# Patient Record
Sex: MALE | Race: WHITE | NOT HISPANIC OR LATINO | Employment: OTHER | URBAN - METROPOLITAN AREA
[De-identification: names, ages, dates, MRNs, and addresses within clinical notes are randomized per-mention and may not be internally consistent; named-entity substitution may affect disease eponyms.]

---

## 2018-07-06 ENCOUNTER — TRANSFERRED RECORDS (OUTPATIENT)
Dept: HEALTH INFORMATION MANAGEMENT | Facility: CLINIC | Age: 63
End: 2018-07-06

## 2019-01-28 ENCOUNTER — TRANSFERRED RECORDS (OUTPATIENT)
Dept: HEALTH INFORMATION MANAGEMENT | Facility: CLINIC | Age: 64
End: 2019-01-28

## 2019-02-03 ENCOUNTER — HOSPITAL ENCOUNTER (EMERGENCY)
Facility: CLINIC | Age: 64
Discharge: HOME | End: 2019-02-03
Attending: NURSE PRACTITIONER | Admitting: NURSE PRACTITIONER
Payer: COMMERCIAL

## 2019-02-03 VITALS
BODY MASS INDEX: 35.44 KG/M2 | DIASTOLIC BLOOD PRESSURE: 74 MMHG | OXYGEN SATURATION: 95 % | WEIGHT: 247 LBS | SYSTOLIC BLOOD PRESSURE: 160 MMHG | HEART RATE: 96 BPM | TEMPERATURE: 98.6 F | RESPIRATION RATE: 16 BRPM

## 2019-02-03 DIAGNOSIS — Z51.89 VISIT FOR WOUND CHECK: ICD-10-CM

## 2019-02-03 PROCEDURE — 99202 OFFICE O/P NEW SF 15 MIN: CPT | Mod: Z6 | Performed by: NURSE PRACTITIONER

## 2019-02-03 PROCEDURE — G0463 HOSPITAL OUTPT CLINIC VISIT: HCPCS | Performed by: NURSE PRACTITIONER

## 2019-02-03 RX ORDER — ATORVASTATIN CALCIUM 20 MG/1
20 TABLET, FILM COATED ORAL DAILY
COMMUNITY
End: 2020-12-24

## 2019-02-03 ASSESSMENT — ENCOUNTER SYMPTOMS
CHILLS: 0
COUGH: 0
FEVER: 0
NAUSEA: 0
EYE PAIN: 0
DYSURIA: 0
SPEECH DIFFICULTY: 0
WOUND: 1
SINUS PAIN: 0
CONFUSION: 0
SORE THROAT: 0
WHEEZING: 0
SHORTNESS OF BREATH: 0
WEAKNESS: 0
ABDOMINAL PAIN: 0
DIARRHEA: 0
VOMITING: 0
NUMBNESS: 0
DIFFICULTY URINATING: 0
DIAPHORESIS: 0
CONSTIPATION: 0

## 2019-02-03 NOTE — ED AVS SNAPSHOT
[facility] Emergency Department  [address] [address]  Phone:  [phone]  Fax:  [phone]                                    Jean Claude Sneed   MRN: [id number]    Department:  [facility] Emergency Department   Date of Visit:  2/3/2019           After Visit Summary Signature Page    I have received my discharge instructions, and my questions have been answered. I have discussed any challenges I see with this plan with the nurse or doctor.    ..........................................................................................................................................  Patient/Patient Representative Signature      ..........................................................................................................................................  Patient Representative Print Name and Relationship to Patient    ..................................................               ................................................  Date                                   Time    ..........................................................................................................................................  Reviewed by Signature/Title    ...................................................              ..............................................  Date                                               Time          22EPIC Rev 08/18

## 2019-05-25 ENCOUNTER — HOSPITAL ENCOUNTER (EMERGENCY)
Facility: CLINIC | Age: 64
Discharge: HOME | End: 2019-05-25
Attending: EMERGENCY MEDICINE | Admitting: EMERGENCY MEDICINE
Payer: COMMERCIAL

## 2019-05-25 VITALS
DIASTOLIC BLOOD PRESSURE: 113 MMHG | OXYGEN SATURATION: 94 % | WEIGHT: 247.4 LBS | HEIGHT: 70 IN | SYSTOLIC BLOOD PRESSURE: 169 MMHG | BODY MASS INDEX: 35.42 KG/M2 | RESPIRATION RATE: 18 BRPM | TEMPERATURE: 97.9 F

## 2019-05-25 DIAGNOSIS — N28.9 RENAL INSUFFICIENCY: ICD-10-CM

## 2019-05-25 DIAGNOSIS — K64.4 EXTERNAL HEMORRHOIDS: ICD-10-CM

## 2019-05-25 DIAGNOSIS — K62.5 RECTAL BLEEDING: ICD-10-CM

## 2019-05-25 LAB
ANION GAP SERPL CALCULATED.3IONS-SCNC: 9 MMOL/L (ref 3–14)
APTT PPP: 30 SEC (ref 22–37)
BASOPHILS # BLD AUTO: 0 10E9/L (ref 0–0.2)
BASOPHILS NFR BLD AUTO: 0.6 %
BUN SERPL-MCNC: 33 MG/DL (ref 7–30)
CALCIUM SERPL-MCNC: 9.7 MG/DL (ref 8.5–10.1)
CHLORIDE SERPL-SCNC: 105 MMOL/L (ref 94–109)
CO2 SERPL-SCNC: 25 MMOL/L (ref 20–32)
CREAT SERPL-MCNC: 1.85 MG/DL (ref 0.66–1.25)
DIFFERENTIAL METHOD BLD: NORMAL
EOSINOPHIL # BLD AUTO: 0.1 10E9/L (ref 0–0.7)
EOSINOPHIL NFR BLD AUTO: 1.5 %
ERYTHROCYTE [DISTWIDTH] IN BLOOD BY AUTOMATED COUNT: 12.2 % (ref 10–15)
GFR SERPL CREATININE-BSD FRML MDRD: 37 ML/MIN/{1.73_M2}
GLUCOSE SERPL-MCNC: 129 MG/DL (ref 70–99)
HCT VFR BLD AUTO: 48.8 % (ref 40–53)
HEMOCCULT STL QL: ABNORMAL
HGB BLD-MCNC: 16.4 G/DL (ref 13.3–17.7)
IMM GRANULOCYTES # BLD: 0 10E9/L (ref 0–0.4)
IMM GRANULOCYTES NFR BLD: 0.3 %
INR PPP: 1.03 (ref 0.86–1.14)
INTERNAL QC OK POCT: YES
LYMPHOCYTES # BLD AUTO: 2 10E9/L (ref 0.8–5.3)
LYMPHOCYTES NFR BLD AUTO: 30.4 %
MCH RBC QN AUTO: 30 PG (ref 26.5–33)
MCHC RBC AUTO-ENTMCNC: 33.6 G/DL (ref 31.5–36.5)
MCV RBC AUTO: 89 FL (ref 78–100)
MONOCYTES # BLD AUTO: 0.6 10E9/L (ref 0–1.3)
MONOCYTES NFR BLD AUTO: 9.1 %
NEUTROPHILS # BLD AUTO: 3.9 10E9/L (ref 1.6–8.3)
NEUTROPHILS NFR BLD AUTO: 58.1 %
NRBC # BLD AUTO: 0 10*3/UL
NRBC BLD AUTO-RTO: 0 /100
PLATELET # BLD AUTO: 171 10E9/L (ref 150–450)
POTASSIUM SERPL-SCNC: 4 MMOL/L (ref 3.4–5.3)
RBC # BLD AUTO: 5.47 10E12/L (ref 4.4–5.9)
SODIUM SERPL-SCNC: 139 MMOL/L (ref 133–144)
TEST CARD LOT NUMBER: ABNORMAL
WBC # BLD AUTO: 6.7 10E9/L (ref 4–11)

## 2019-05-25 PROCEDURE — 96361 HYDRATE IV INFUSION ADD-ON: CPT | Performed by: EMERGENCY MEDICINE

## 2019-05-25 PROCEDURE — 96360 HYDRATION IV INFUSION INIT: CPT | Performed by: EMERGENCY MEDICINE

## 2019-05-25 PROCEDURE — 99284 EMERGENCY DEPT VISIT MOD MDM: CPT | Mod: Z6 | Performed by: EMERGENCY MEDICINE

## 2019-05-25 PROCEDURE — 85610 PROTHROMBIN TIME: CPT | Performed by: EMERGENCY MEDICINE

## 2019-05-25 PROCEDURE — 99283 EMERGENCY DEPT VISIT LOW MDM: CPT | Mod: 25 | Performed by: EMERGENCY MEDICINE

## 2019-05-25 PROCEDURE — 25000128 H RX IP 250 OP 636: Performed by: EMERGENCY MEDICINE

## 2019-05-25 PROCEDURE — 85025 COMPLETE CBC W/AUTO DIFF WBC: CPT | Performed by: EMERGENCY MEDICINE

## 2019-05-25 PROCEDURE — 85730 THROMBOPLASTIN TIME PARTIAL: CPT | Performed by: EMERGENCY MEDICINE

## 2019-05-25 PROCEDURE — 82272 OCCULT BLD FECES 1-3 TESTS: CPT | Performed by: EMERGENCY MEDICINE

## 2019-05-25 PROCEDURE — 80048 BASIC METABOLIC PNL TOTAL CA: CPT | Performed by: EMERGENCY MEDICINE

## 2019-05-25 RX ORDER — HYDROCHLOROTHIAZIDE 25 MG/1
25 TABLET ORAL EVERY MORNING
COMMUNITY
Start: 2014-12-01 | End: 2020-12-24

## 2019-05-25 RX ADMIN — SODIUM CHLORIDE 500 ML: 9 INJECTION, SOLUTION INTRAVENOUS at 10:44

## 2019-05-25 ASSESSMENT — ENCOUNTER SYMPTOMS
CONSTIPATION: 0
NECK PAIN: 0
NUMBNESS: 0
WEAKNESS: 0
BACK PAIN: 0
DYSURIA: 0
ABDOMINAL DISTENTION: 0
DIARRHEA: 0
RECTAL PAIN: 1
NAUSEA: 0
BLOOD IN STOOL: 1
VOMITING: 0
ANAL BLEEDING: 1
WOUND: 0
HEADACHES: 0
ABDOMINAL PAIN: 0
BRUISES/BLEEDS EASILY: 0
SHORTNESS OF BREATH: 0

## 2019-05-25 ASSESSMENT — MIFFLIN-ST. JEOR: SCORE: 1908.45

## 2019-05-25 NOTE — DISCHARGE INSTRUCTIONS
Return if symptoms worsen or new symptoms develop.  Follow-up with primary care physician next week for recheck.  Use Preparation H and Tucks pads for comfort.  Take Metamucil or Citrucel as directed.  Drink plenty of fluids.  Your kidney function has increased a bit and increased hydration may help with this a bit.  No evidence of thrombosed hemorrhoids.  [facility] if not improving.  If any fevers increased pain increased bleeding decreased urine output or other symptoms present please return for further evaluation and care.

## 2019-05-25 NOTE — ED PROVIDER NOTES
History     Chief Complaint   Patient presents with     Rectal Bleeding     bleeding for 30 min post BM, pt takes baby aspirin     HPI  Jean Claude Sneed is a 64 year old male who presents to the emergency department today for evaluation of rectal bleeding. Patient states that once every three or four months he has a bowel movement that results in rectal bleeding. Normally he notices the blood when he wipes and then within a minute it stops. Patient states that this morning after a bowel movement he noticed blood in the toilet paper. He reports that it took half an hour for the bleeding to stop. After a few hours he had another bowel movement and the bleeding began again. He states that he had enough and wanted to be seen in the ED to figure out what is wrong. He reports mild pain during his bowel movements. Patient reports a history of hemorrhoids and believes maybe one of these is causing his bleeding. He denies any chest pain, leg swelling or shortness of breath. He reports that he had a colonoscopy last year. He takes a daily baby aspirin and medications for hypertension and hyperlipidemia.  He denies any pain at this time.    Patient Active Problem List   Diagnosis     Essential hypertension, benign     Tobacco abuse     Current Outpatient Medications   Medication Sig Dispense Refill     aspirin 81 MG EC tablet Take 81 mg by mouth daily.       atorvastatin (LIPITOR) 20 MG tablet Take 20 mg by mouth daily       fluticasone (FLONASE) 50 MCG/ACT nasal spray Spray 2 sprays into both nostrils daily as needed For nasal congestion       hydrochlorothiazide (MICROZIDE) 12.5 MG capsule Take 25 mg by mouth daily.       lisinopril (PRINIVIL,ZESTRIL) 20 MG tablet Take 20 mg by mouth daily.       omeprazole (PRILOSEC) 40 MG capsule Take 40 mg by mouth every evening       Allergies   Allergen Reactions     Beta Adrenergic Blockers Other (See Comments)     Hands arms and feet cold      Azelastine Other (See Comments)      "Dizzy, \"shaky\" and sick to stomach     Ciprofloxacin Other (See Comments)     Dizzy and sick to stomach     Diltiazem Other (See Comments)     Blood pressure up and pulse up then down      Ibuprofen      Other reaction(s): Hypertension     Naproxen Other (See Comments)     Feels sick, like coming down with the stomach flu     Citalopram Anxiety     Other reaction(s): Agitation  Side effects started in less than an hour after first pill      Allergies:  Allergies   Allergen Reactions     Beta Adrenergic Blockers Other (See Comments)     Hands arms and feet cold      Azelastine Other (See Comments)     Dizzy, \"shaky\" and sick to stomach     Ciprofloxacin Other (See Comments)     Dizzy and sick to stomach     Diltiazem Other (See Comments)     Blood pressure up and pulse up then down      Ibuprofen      Other reaction(s): Hypertension     Naproxen Other (See Comments)     Feels sick, like coming down with the stomach flu     Citalopram Anxiety     Other reaction(s): Agitation  Side effects started in less than an hour after first pill       Problem List:    Patient Active Problem List    Diagnosis Date Noted     Essential hypertension, benign 07/26/2012     Priority: Medium     Tobacco abuse 07/26/2012     Priority: Medium        Past Medical History:    Past Medical History:   Diagnosis Date     HTN (hypertension)        Past Surgical History:    Past Surgical History:   Procedure Laterality Date     left knee surgery  1996       Family History:    No family history on file.    Social History:  Marital Status:   [5]  Social History     Tobacco Use     Smoking status: Current Every Day Smoker     Packs/day: 0.50     Years: 30.00     Pack years: 15.00     Types: Cigarettes     Smokeless tobacco: Never Used   Substance Use Topics     Alcohol use: Not on file     Drug use: Not on file        Medications:      aspirin 81 MG EC tablet   atorvastatin (LIPITOR) 20 MG tablet   fluticasone (FLONASE) 50 MCG/ACT nasal " "spray   hydrochlorothiazide (MICROZIDE) 12.5 MG capsule   lisinopril (PRINIVIL,ZESTRIL) 20 MG tablet   omeprazole (PRILOSEC) 40 MG capsule         Review of Systems   Constitutional: Positive for activity change. Negative for fever.   HENT: Negative for congestion.    Eyes: Negative for visual disturbance.   Respiratory: Negative for shortness of breath.    Cardiovascular: Negative for chest pain and leg swelling.   Gastrointestinal: Positive for anal bleeding, blood in stool and rectal pain. Negative for abdominal distention, abdominal pain, constipation, diarrhea, nausea and vomiting.   Genitourinary: Negative for decreased urine volume and dysuria.   Musculoskeletal: Negative for back pain, gait problem and neck pain.   Skin: Negative for wound.   Neurological: Negative for weakness, numbness and headaches.   Hematological: Does not bruise/bleed easily.   Psychiatric/Behavioral: Negative for confusion.       Physical Exam   BP: (!) 169/113  Heart Rate: 109  Temp: 97.9  F (36.6  C)  Resp: 18  Height: 177.8 cm (5' 10\")  Weight: 112.2 kg (247 lb 6.4 oz)  SpO2: 94 %      Physical Exam   Constitutional: He appears well-nourished. No distress.   Eyes: Conjunctivae are normal.   Pulmonary/Chest: Effort normal.   Abdominal: Soft. Bowel sounds are normal. He exhibits no distension. There is no tenderness.   Genitourinary:   Genitourinary Comments: Rectal with several external nonthrombosed hemorrhoids present and a apparent internal hemorrhoid hanging down.  This area was reduced with gentle pressure.  Areas are tender to palpation.  There is no gross bleeding.   Musculoskeletal: Normal range of motion. He exhibits no tenderness.   Neurological: He is alert. He exhibits normal muscle tone.   Skin: Skin is warm and dry. Capillary refill takes less than 2 seconds. No pallor.   Psychiatric: He has a normal mood and affect.   Nursing note and vitals reviewed.      ED Course   10:15 AM Patient assessed.       Procedures        "        Critical Care time:  none               Results for orders placed or performed during the hospital encounter of 05/25/19 (from the past 24 hour(s))   Occult blood stool POCT   Result Value Ref Range    Occult Blood pos (Pos) neg    Internal QC OK Yes     Test Card Lot Number [id number]    CBC with platelets differential   Result Value Ref Range    WBC 6.7 4.0 - 11.0 10e9/L    RBC Count 5.47 4.4 - 5.9 10e12/L    Hemoglobin 16.4 13.3 - 17.7 g/dL    Hematocrit 48.8 40.0 - 53.0 %    MCV 89 78 - 100 fl    MCH 30.0 26.5 - 33.0 pg    MCHC 33.6 31.5 - 36.5 g/dL    RDW 12.2 10.0 - 15.0 %    Platelet Count 171 150 - 450 10e9/L    Diff Method Automated Method     % Neutrophils 58.1 %    % Lymphocytes 30.4 %    % Monocytes 9.1 %    % Eosinophils 1.5 %    % Basophils 0.6 %    % Immature Granulocytes 0.3 %    Nucleated RBCs 0 0 /100    Absolute Neutrophil 3.9 1.6 - 8.3 10e9/L    Absolute Lymphocytes 2.0 0.8 - 5.3 10e9/L    Absolute Monocytes 0.6 0.0 - 1.3 10e9/L    Absolute Eosinophils 0.1 0.0 - 0.7 10e9/L    Absolute Basophils 0.0 0.0 - 0.2 10e9/L    Abs Immature Granulocytes 0.0 0 - 0.4 10e9/L    Absolute Nucleated RBC 0.0    INR   Result Value Ref Range    INR 1.03 0.86 - 1.14   Partial thromboplastin time   Result Value Ref Range    PTT 30 22 - 37 sec   Basic metabolic panel   Result Value Ref Range    Sodium 139 133 - 144 mmol/L    Potassium 4.0 3.4 - 5.3 mmol/L    Chloride 105 94 - 109 mmol/L    Carbon Dioxide 25 20 - 32 mmol/L    Anion Gap 9 3 - 14 mmol/L    Glucose 129 (H) 70 - 99 mg/dL    Urea Nitrogen 33 (H) 7 - 30 mg/dL    Creatinine 1.85 (H) 0.66 - 1.25 mg/dL    GFR Estimate 37 (L) >60 mL/min/[1.73_m2]    GFR Estimate If Black 43 (L) >60 mL/min/[1.73_m2]    Calcium 9.7 8.5 - 10.1 mg/dL       Medications - No data to display    Assessments & Plan (with Medical Decision Making) records were reviewed.  Labs were obtained.  Patient was given IV fluids.  Patient has apparent internal and external hemorrhoids which are  more than likely the cause of his bleeding at this point.  These hemorrhoids are not thrombosed at this point.  White count was 6.7.  Hemoglobin 16.4.  Platelet count was 171 there is no left shift.  Basic metabolic panel significant for a BUN elevated at 33 creatinine of 1.85 last BUN noted was 1.85.  INR was 1.03 and PTT was 30.  Findings were discussed with patient.  At this time he is not having any abdominal pain.  He is only having minimal bleeding at this time vitals are stable.  I discussed treatment of hemorrhoids with patient.  Including hemorrhoidal creams tuck pads and sits baths.  He should begin Metamucil or Citrucel daily.  And probably should follow-up with a surgeon for further evaluation and care and possible colonoscopy.  Patient had a colonoscopy a year ago.  Blood pressure is improved but slightly elevated.  He [facility]  He states he [facility] this [facility] for further assessment and care.  He understands if any increased rectal pain worsening bleeding or other symptoms occur he will return for further evaluation and care.     I have reviewed the nursing notes.    I have reviewed the findings, diagnosis, plan and need for follow up with the patient.          Medication List      There are no discharge medications for this visit.         Final diagnoses:   Rectal bleeding   External hemorrhoids   Renal insufficiency     This document serves as a record of the services and decisions personally performed and made by Abram Elder MD. It was created on HIS/HER behalf by Brittany Martinez, a trained medical scribe. The creation of this document is based on the provider's statements to the medical scribe.  Britatny Martinez 10:20 AM 5/25/2019    Provider:  The information in this document, created by the medical scribe for me, accurately reflects the services I personally performed and the decisions made by me. I have reviewed and approved this  document for accuracy prior to leaving the patient care area.  Abram Elder MD 10:20 AM 5/25/2019 5/25/2019   [facility] EMERGENCY DEPARTMENT     Abram Elder MD  05/27/19 1106

## 2019-05-25 NOTE — ED NOTES
Pt has an ongoing issue with rectal bleeding with BM, this bleeding is usually stops shortly after but this am he had a BM and had to sit on the toilet for about 1/2 hour before it would stop. Then later today he had another BM and had bleeding again and just is concerned that it didn't stop as soon as it usually does. Has history of hemorrhoids and has always been able to feel a few small lumps when he wipes after BM. He denies fever/chills. And states blood is bright red

## 2019-05-25 NOTE — ED AVS SNAPSHOT
[facility] Emergency Department  [address] [address]  Phone:  [phone]  Fax:  [phone]                                    Jean Claude Sneed   MRN: [id number]    Department:  [facility] Emergency Department   Date of Visit:  5/25/2019           After Visit Summary Signature Page    I have received my discharge instructions, and my questions have been answered. I have discussed any challenges I see with this plan with the nurse or doctor.    ..........................................................................................................................................  Patient/Patient Representative Signature      ..........................................................................................................................................  Patient Representative Print Name and Relationship to Patient    ..................................................               ................................................  Date                                   Time    ..........................................................................................................................................  Reviewed by Signature/Title    ...................................................              ..............................................  Date                                               Time          22EPIC Rev 08/18

## 2019-05-27 ASSESSMENT — ENCOUNTER SYMPTOMS
ACTIVITY CHANGE: 1
FEVER: 0
CONFUSION: 0

## 2019-06-10 ENCOUNTER — TRANSFERRED RECORDS (OUTPATIENT)
Dept: HEALTH INFORMATION MANAGEMENT | Facility: CLINIC | Age: 64
End: 2019-06-10

## 2019-06-10 LAB
ALBUMIN (URINE) MG/SPEC: 10.4 MG/L
CREAT SERPL-MCNC: 1.8 MG/DL (ref 0.7–1.2)
CREATININE (URINE): 67.6 MG/DL (ref 5–161)
GFR SERPL CREATININE-BSD FRML MDRD: 38 ML/MIN/1.73M^2
GLUCOSE SERPL-MCNC: 225 MG/DL (ref 74–106)
HBA1C MFR BLD: 6.9 % (ref 4–6)
POTASSIUM SERPL-SCNC: 4.2 MMOL/L (ref 3.5–5)
TSH SERPL-ACNC: 2.18 UIU/ML (ref 0.35–4.94)

## 2019-12-26 ENCOUNTER — TRANSFERRED RECORDS (OUTPATIENT)
Dept: HEALTH INFORMATION MANAGEMENT | Facility: CLINIC | Age: 64
End: 2019-12-26

## 2020-03-06 ENCOUNTER — TELEPHONE (OUTPATIENT)
Dept: FAMILY MEDICINE | Facility: CLINIC | Age: 65
End: 2020-03-06

## 2020-03-06 NOTE — TELEPHONE ENCOUNTER
forms received from [facility]  Called and spoke with representative, they report that the forms sent were for the new provider through this clinic to review health history.  Patient would like to establish care with a provider close to home.    Voice mail box has not been set up on cell phone  Home and mobile number belong to someone else, that person asked us to delete this number from patient's chart.    Called patient to help him schedule an appointment.    Megan DONG Rn

## 2020-03-09 NOTE — TELEPHONE ENCOUNTER
Needs appointment to establish care. No answer and no voicemail set up. Left message with [facility] to return my call.        CAREY ShipleyN, RN

## 2020-03-11 NOTE — TELEPHONE ENCOUNTER
3rd attempt to patient: Needs appointment to establish care. No answer and no voicemail set up.     Left message with [facility] to return my call.          CAREY ShipleyN, RN

## 2020-03-19 ENCOUNTER — OFFICE VISIT (OUTPATIENT)
Dept: FAMILY MEDICINE | Facility: CLINIC | Age: 65
End: 2020-03-19
Payer: COMMERCIAL

## 2020-03-19 VITALS
SYSTOLIC BLOOD PRESSURE: 156 MMHG | OXYGEN SATURATION: 96 % | RESPIRATION RATE: 20 BRPM | HEIGHT: 70 IN | WEIGHT: 244 LBS | BODY MASS INDEX: 34.93 KG/M2 | DIASTOLIC BLOOD PRESSURE: 80 MMHG | TEMPERATURE: 98.2 F | HEART RATE: 79 BPM

## 2020-03-19 DIAGNOSIS — E78.5 HYPERLIPIDEMIA LDL GOAL <130: ICD-10-CM

## 2020-03-19 DIAGNOSIS — M19.079 ARTHRITIS OF FOOT: Primary | ICD-10-CM

## 2020-03-19 DIAGNOSIS — I10 ESSENTIAL HYPERTENSION, BENIGN: ICD-10-CM

## 2020-03-19 PROBLEM — R73.03 PREDIABETES: Status: ACTIVE | Noted: 2020-03-19

## 2020-03-19 PROBLEM — N18.2 CHRONIC KIDNEY DISEASE, STAGE 2 (MILD): Status: ACTIVE | Noted: 2020-03-19

## 2020-03-19 LAB — URATE SERPL-MCNC: 10.4 MG/DL (ref 3.5–7.2)

## 2020-03-19 PROCEDURE — 99204 OFFICE O/P NEW MOD 45 MIN: CPT | Performed by: FAMILY MEDICINE

## 2020-03-19 PROCEDURE — 84550 ASSAY OF BLOOD/URIC ACID: CPT | Performed by: FAMILY MEDICINE

## 2020-03-19 PROCEDURE — 36415 COLL VENOUS BLD VENIPUNCTURE: CPT | Performed by: FAMILY MEDICINE

## 2020-03-19 RX ORDER — ACETAMINOPHEN 500 MG
500-1000 TABLET ORAL EVERY 6 HOURS PRN
COMMUNITY

## 2020-03-19 RX ORDER — ALLOPURINOL 300 MG/1
300 TABLET ORAL DAILY
Qty: 30 TABLET | Refills: 11 | Status: SHIPPED | OUTPATIENT
Start: 2020-03-19 | End: 2020-03-26

## 2020-03-19 ASSESSMENT — MIFFLIN-ST. JEOR: SCORE: 1889.06

## 2020-03-19 NOTE — PATIENT INSTRUCTIONS
(M19.079) Arthritis of foot  Comment:   Plan: Uric acid        We discussed the arthritis and do the one lab today for possible gout. Use the lower protein diet, and all you need is 2 ounces a day.   Stay well hydrated. If the uric acid is high then start Allopurinol at 300 mg daily. The lab is only done annually.     (E78.5) Hyperlipidemia LDL goal <130  Comment:   Plan: use the lower chol diet and daily exercise. [facility]     (I10) Essential hypertension, benign  Comment:   Plan: [facility]. Monitor and record the BP at rest.   The goal for the average is under 130/80. Calibrate the BP machine annually.   Use the med and the non drug therapies.

## 2020-03-19 NOTE — PROGRESS NOTES
Subjective     Jean Claude Sneed is a 64 year old male who presents to clinic today for the following health issues:    HPI   New Patient/Transfer of Care  [facility] [place], and has the labs done annually.     Patient presents for evaluation of hypertension.  He indicates that he is feeling well and denies any symptoms referable to his elevated blood pressure. Specifically denies chest pain, palpitations, dyspnea, orthopnea, PND or peripheral edema. Current medication regimen is as listed below. Patient denies any side effects of medication. His average BP has been 127/78.     Hyperlipidemia check;  He has been following a low fat diet plan and is following a regular exercise plan.  Jean Claude reports compliance with medications and he is not experiencing myalgias or neuropathic symptoms.  He does not know the labs.     He has a hx of acute, severe arthritis in the ankles and the toes, and wonders if this is gout. His brother has it.     [facility].   [facility]         Current Outpatient Medications:      aspirin 81 MG EC tablet, Take 81 mg by mouth daily., Disp: , Rfl:      atorvastatin (LIPITOR) 20 MG tablet, Take 20 mg by mouth daily Patient takes 1/2 tablet daily, Disp: , Rfl:      hydrochlorothiazide (HYDRODIURIL) 25 MG tablet, Take 25 mg by mouth every morning, Disp: , Rfl:      lisinopril (PRINIVIL,ZESTRIL) 20 MG tablet, Take 40 mg by mouth daily , Disp: , Rfl:      omeprazole (PRILOSEC) 40 MG capsule, Take 20 mg by mouth every evening , Disp: , Rfl:      acetaminophen (TYLENOL) 500 MG tablet, Take 500-1,000 mg by mouth every 6 hours as needed for mild pain Will take PRN if headache. Patient takes half of the 500 MG tablet., Disp: , Rfl:      fluticasone (FLONASE) 50 MCG/ACT nasal spray, Spray 2 sprays into both nostrils daily as needed For nasal congestion, Disp: , Rfl:     Patient Active Problem List   Diagnosis     Essential hypertension, benign  "    Tobacco abuse     Hyperlipidemia LDL goal <130     Chronic kidney disease, stage 2 (mild)     Prediabetes     Blood pressure (!) 156/80, pulse 79, temperature 98.2  F (36.8  C), temperature source Tympanic, resp. rate 20, height 1.772 m (5' 9.75\"), weight 110.7 kg (244 lb), SpO2 96 %.    Exam:  GENERAL APPEARANCE: healthy, alert and no distress  EYES: EOMI,  PERRL  NECK: no adenopathy, no asymmetry, masses, or scars and thyroid normal to palpation  RESP: lungs clear to auscultation - no rales, rhonchi or wheezes  CV: regular rates and rhythm, normal S1 S2, no S3 or S4 and no murmur, click or rub -  MS: extremities normal- no gross deformities noted, no evidence of inflammation in joints, FROM in all extremities.  PSYCH: mentation appears normal and affect normal/bright      (M19.079) Arthritis of foot  Comment:   Plan: Uric acid        We discussed the arthritis and do the one lab today for possible gout. Use the lower protein diet, and all you need is 2 ounces a day.   Stay well hydrated. If the uric acid is high then start Allopurinol at 300 mg daily. The lab is only done annually.     (E78.5) Hyperlipidemia LDL goal <130  Comment:   Plan: use the lower chol diet and daily exercise. [facility].     (I10) Essential hypertension, benign  Comment:   Plan: [facility]. Monitor and record the BP at rest.   The goal for the average is under 130/80. Calibrate the BP machine annually.   Use the med and the non drug therapies.       Abram Teague MD              "

## 2020-03-26 ENCOUNTER — TELEPHONE (OUTPATIENT)
Dept: FAMILY MEDICINE | Facility: CLINIC | Age: 65
End: 2020-03-26

## 2020-03-26 DIAGNOSIS — M19.079 ARTHRITIS OF FOOT: ICD-10-CM

## 2020-03-26 DIAGNOSIS — E79.0 HYPERURICEMIA: Primary | ICD-10-CM

## 2020-03-26 DIAGNOSIS — E79.0 HYPERURICEMIA: ICD-10-CM

## 2020-03-26 LAB
ANION GAP SERPL CALCULATED.3IONS-SCNC: 6 MMOL/L (ref 3–14)
BUN SERPL-MCNC: 28 MG/DL (ref 7–30)
CALCIUM SERPL-MCNC: 9.5 MG/DL (ref 8.5–10.1)
CHLORIDE SERPL-SCNC: 103 MMOL/L (ref 94–109)
CO2 SERPL-SCNC: 29 MMOL/L (ref 20–32)
CREAT SERPL-MCNC: 2.08 MG/DL (ref 0.66–1.25)
GFR SERPL CREATININE-BSD FRML MDRD: 32 ML/MIN/{1.73_M2}
GLUCOSE SERPL-MCNC: 112 MG/DL (ref 70–99)
POTASSIUM SERPL-SCNC: 4.2 MMOL/L (ref 3.4–5.3)
SODIUM SERPL-SCNC: 138 MMOL/L (ref 133–144)

## 2020-03-26 PROCEDURE — 36415 COLL VENOUS BLD VENIPUNCTURE: CPT | Performed by: FAMILY MEDICINE

## 2020-03-26 PROCEDURE — 80048 BASIC METABOLIC PNL TOTAL CA: CPT | Performed by: FAMILY MEDICINE

## 2020-03-26 RX ORDER — ALLOPURINOL 100 MG/1
100 TABLET ORAL DAILY
Qty: 30 TABLET | Refills: 11 | Status: SHIPPED | OUTPATIENT
Start: 2020-03-26 | End: 2020-04-17

## 2020-03-26 NOTE — TELEPHONE ENCOUNTER
Reason for Call:  Other prescription    Detailed comments: Jarrod [facility] is [facility] about allopurinol (ZYLOPRIM) 300 MG tablet.  Stating they do not have a current creatine lab for patient.  Asking if Dr. Teague still wants prescribing dose for patient or if patient needs updated labs?    Phone Number Patient can be reached at: Other phone number:  andera - [phone]    Best Time: Any    Can we leave a detailed message on this number? YES    Call taken on 3/26/2020 at 10:57 AM by Renetta Mena

## 2020-03-26 NOTE — TELEPHONE ENCOUNTER
Left message for patient to return call to clinic.  CSS - please let patient know he is due for a lab.        Jarrod March does not need a copy of the results.  She states based on the results his dose may need changing?  If there is a dose change then she will need new orders for the medication.    Routing to provider.  Katelyn DONG RN

## 2020-03-26 NOTE — TELEPHONE ENCOUNTER
The future lab is ordered. Please call the pt about how to get this. [facility]. See the phone note from today.   Abram Teague MD

## 2020-03-26 NOTE — TELEPHONE ENCOUNTER
Please notify [facility] for the 100 mg daily dose of the Allopurinol.     Abram Teague MD

## 2020-04-17 ENCOUNTER — TELEPHONE (OUTPATIENT)
Dept: FAMILY MEDICINE | Facility: CLINIC | Age: 65
End: 2020-04-17

## 2020-04-17 DIAGNOSIS — M19.079 ARTHRITIS OF FOOT: ICD-10-CM

## 2020-04-17 RX ORDER — ALLOPURINOL 100 MG/1
100 TABLET ORAL DAILY
Qty: 90 TABLET | Refills: 2 | Status: SHIPPED | OUTPATIENT
Start: 2020-04-17 | End: 2020-12-24

## 2020-04-17 NOTE — TELEPHONE ENCOUNTER
Reason for Call:  Medication or medication refill:    Do you use a [facility]?  Name of the pharmacy and phone number for the current request:  [facility]    Name of the medication requested: Allopurinol     Other request: Patient get prescription monthly, wants it changed to every 90 days.    Can we leave a detailed message on this number? YES    Phone number patient can be reached at: Home number on file [phone] (home)    Best Time: Any    Call taken on 4/17/2020 at 11:56 AM by Mirian Linder

## 2020-09-30 ENCOUNTER — IMMUNIZATION (OUTPATIENT)
Dept: FAMILY MEDICINE | Facility: CLINIC | Age: 65
End: 2020-09-30
Payer: COMMERCIAL

## 2020-09-30 PROCEDURE — 90662 IIV NO PRSV INCREASED AG IM: CPT

## 2020-09-30 PROCEDURE — 90471 IMMUNIZATION ADMIN: CPT

## 2020-12-24 ENCOUNTER — VIRTUAL VISIT (OUTPATIENT)
Dept: FAMILY MEDICINE | Facility: CLINIC | Age: 65
End: 2020-12-24
Payer: COMMERCIAL

## 2020-12-24 DIAGNOSIS — E78.5 HYPERLIPIDEMIA LDL GOAL <130: ICD-10-CM

## 2020-12-24 DIAGNOSIS — M19.079 ARTHRITIS OF FOOT: ICD-10-CM

## 2020-12-24 DIAGNOSIS — J45.20 MILD INTERMITTENT ASTHMA WITHOUT COMPLICATION: Primary | ICD-10-CM

## 2020-12-24 DIAGNOSIS — K21.00 GASTROESOPHAGEAL REFLUX DISEASE WITH ESOPHAGITIS WITHOUT HEMORRHAGE: ICD-10-CM

## 2020-12-24 DIAGNOSIS — I10 ESSENTIAL HYPERTENSION, BENIGN: ICD-10-CM

## 2020-12-24 DIAGNOSIS — M10.9 GOUT, UNSPECIFIED CAUSE, UNSPECIFIED CHRONICITY, UNSPECIFIED SITE: ICD-10-CM

## 2020-12-24 PROCEDURE — 99214 OFFICE O/P EST MOD 30 MIN: CPT | Mod: 95 | Performed by: FAMILY MEDICINE

## 2020-12-24 RX ORDER — LISINOPRIL 20 MG/1
20 TABLET ORAL DAILY
Qty: 90 TABLET | Refills: 3 | Status: SHIPPED | OUTPATIENT
Start: 2020-12-24 | End: 2021-02-01

## 2020-12-24 RX ORDER — OMEPRAZOLE 40 MG/1
40 CAPSULE, DELAYED RELEASE ORAL EVERY EVENING
Qty: 90 CAPSULE | Refills: 3 | Status: SHIPPED | OUTPATIENT
Start: 2020-12-24

## 2020-12-24 RX ORDER — ALBUTEROL SULFATE 90 UG/1
2 AEROSOL, METERED RESPIRATORY (INHALATION) EVERY 4 HOURS PRN
Qty: 1 INHALER | Refills: 11 | Status: SHIPPED | OUTPATIENT
Start: 2020-12-24

## 2020-12-24 RX ORDER — ALLOPURINOL 100 MG/1
100 TABLET ORAL DAILY
Qty: 90 TABLET | Refills: 3 | Status: SHIPPED | OUTPATIENT
Start: 2020-12-24 | End: 2023-02-16

## 2020-12-24 RX ORDER — HYDROCHLOROTHIAZIDE 25 MG/1
25 TABLET ORAL EVERY MORNING
Qty: 90 TABLET | Refills: 3 | Status: ON HOLD | OUTPATIENT
Start: 2020-12-24 | End: 2021-08-09

## 2020-12-24 RX ORDER — ATORVASTATIN CALCIUM 20 MG/1
20 TABLET, FILM COATED ORAL DAILY
Qty: 45 TABLET | Refills: 3 | Status: SHIPPED | OUTPATIENT
Start: 2020-12-24 | End: 2021-01-06

## 2020-12-24 NOTE — PROGRESS NOTES
"Jean Claude Sneed is a 65 year old male who is being evaluated via a billable telephone visit.      The patient has been notified of following:     \"This telephone visit will be conducted via a call between you and your physician/provider. We have found that certain health care needs can be provided without the need for a physical exam.  This service lets us provide the care you need with a short phone conversation.  If a prescription is necessary we can send it directly to your pharmacy.  If lab work is needed we can place an order for that and you can then stop by our lab to have the test done at a later time.    Telephone visits are billed at different rates depending on your insurance coverage. During this emergency period, for some insurers they may be billed the same as an in-person visit.  Please reach out to your insurance provider with any questions.    If during the course of the call the physician/provider feels a telephone visit is not appropriate, you will not be charged for this service.\"    Patient has given verbal consent for Telephone visit?  Yes    What phone number would you like to be contacted at? [phone]    How would you like to obtain your AVS? Mail a copy    Subjective     Jean Claude Sneed is a 65 year old male who presents via phone visit today for the following health issues:    HPI     Chief Complaint   Patient presents with     Hypertension     Lipids     Shortness of Breath     Would like to discuss prescription for inhaler, has had an inhaler in the past.  History of asthma, PFT done in the past.            Hyperlipidemia Follow-Up      Are you regularly taking any medication or supplement to lower your cholesterol?   Yes- atorvastatin    Are you having muscle aches or other side effects that you think could be caused by your cholesterol lowering medication?  No    Hypertension Follow-up      Do you check your blood pressure regularly outside of the clinic? Yes , tries to one time " per week    Are you following a low salt diet? Yes, no added salt    Are your blood pressures ever more than 140 on the top number (systolic) OR more   than 90 on the bottom number (diastolic), for example 140/90? No      How many servings of fruits and vegetables do you eat daily?  2-3    On average, how many sweetened beverages do you drink each day (Examples: soda, juice, sweet tea, etc.  Do NOT count diet or artificially sweetened beverages)?   2    How many days per week do you exercise enough to make your heart beat faster? 7    How many minutes a day do you exercise enough to make your heart beat faster? Walking    How many days per week do you miss taking your medication? 0    Medication Followup of allopurinol, omeprazole    Taking Medication as prescribed: yes    Side Effects:  None    Medication Helping Symptoms:  yes       Current Outpatient Medications   Medication Instructions     acetaminophen (TYLENOL) 500-1,000 mg, Oral, EVERY 6 HOURS PRN, Will take PRN if headache. Patient takes half of the 500 MG tablet.      albuterol (PROAIR HFA/PROVENTIL HFA/VENTOLIN HFA) 108 (90 Base) MCG/ACT inhaler 2 puffs, Inhalation, EVERY 4 HOURS PRN     allopurinol (ZYLOPRIM) 100 mg, Oral, DAILY     aspirin (ASA) 81 mg, Oral, DAILY     atorvastatin (LIPITOR) 20 mg, Oral, DAILY, Patient takes 1/2 tablet daily     fluticasone (FLONASE) 50 MCG/ACT nasal spray 2 sprays, Both Nostrils, DAILY PRN, For nasal congestion     hydrochlorothiazide (HYDRODIURIL) 25 mg, Oral, EVERY MORNING     lisinopril (ZESTRIL) 40 mg, Oral, DAILY     omeprazole (PRILOSEC) 20 mg, Oral, EVERY EVENING       Patient Active Problem List   Diagnosis     Essential hypertension, benign     Tobacco abuse     Hyperlipidemia LDL goal <130     Chronic kidney disease, stage 2 (mild)     Prediabetes       (J45.20) Mild intermittent asthma without complication  (primary encounter diagnosis)  Comment:   Plan: albuterol (PROAIR HFA/PROVENTIL HFA/VENTOLIN          HFA) 108 (90 Base) MCG/ACT inhaler        Use the inhaler as discussed: 2 puffs  by one minute. It can be used every 4 hours as needed.   You did do the flu shot. Do that every fall. Identify triggers to avoid. Use the inhaler before triggers, if they are not avoidable.     (I10) Essential hypertension, benign  Comment:   Plan: Creatinine, Potassium        Use the meds as discussed. Refills are done for one year. Do the labs as discussed.     (E78.5) Hyperlipidemia LDL goal <130  Comment:   Plan: Lipid panel reflex to direct LDL Fasting, ALT        Use the lower chol diet and daily exercise.   Refills are done.   Do the fasting labs as discussed.     (M10.9) Gout, unspecified cause, unspecified chronicity, unspecified site  Comment:   Plan: Uric acid        Use the lower protein diet and stay well hydrated. Monitor for gout symptoms.    Stay on the med daily. Refills are done. Do the labs and we will call results.     (K21.00) Gastroesophageal reflux disease with esophagitis without hemorrhage  Comment:   Plan: omeprazole (PRILOSEC) 40 MG DR capsule        Use the med and the dietary and mechanical instructions. The goal for the   Symptoms is to be zero. Refills done for one year.       Abram Teague MD    Phone call duration:  24 minutes

## 2021-01-06 DIAGNOSIS — E78.5 HYPERLIPIDEMIA LDL GOAL <130: ICD-10-CM

## 2021-01-06 RX ORDER — ATORVASTATIN CALCIUM 20 MG/1
20 TABLET, FILM COATED ORAL DAILY
Qty: 45 TABLET | Refills: 3 | Status: SHIPPED | OUTPATIENT
Start: 2021-01-06

## 2021-01-06 NOTE — TELEPHONE ENCOUNTER
Rx failed to sent to [facility] - Please resend ASAP. No need to call patient back, unless there are questions or problems.        atorvastatin (LIPITOR) 20 MG tablet 45 tablet 3 12/24/2020  No   Sig - Route: Take 1 tablet (20 mg) by mouth daily Patient takes 1/2 tablet daily - Oral   Sent to pharmacy as: Atorvastatin Calcium 20 MG Oral Tablet (LIPITOR)   Class: E-Prescribe   Order: [id number]   E-Prescribing Status: Transmission to pharmacy failed (12/24/2020  9:59 AM CST)   Message completed by Diane Fitzgerald RN (12/28/2020 12:18 PM).

## 2021-01-12 ENCOUNTER — APPOINTMENT (OUTPATIENT)
Dept: GENERAL RADIOLOGY | Facility: CLINIC | Age: 66
End: 2021-01-12
Attending: EMERGENCY MEDICINE
Payer: COMMERCIAL

## 2021-01-12 ENCOUNTER — APPOINTMENT (OUTPATIENT)
Dept: MRI IMAGING | Facility: CLINIC | Age: 66
End: 2021-01-12
Attending: EMERGENCY MEDICINE
Payer: COMMERCIAL

## 2021-01-12 ENCOUNTER — HOSPITAL ENCOUNTER (EMERGENCY)
Facility: CLINIC | Age: 66
Discharge: HOME | End: 2021-01-12
Attending: EMERGENCY MEDICINE | Admitting: EMERGENCY MEDICINE
Payer: COMMERCIAL

## 2021-01-12 VITALS
DIASTOLIC BLOOD PRESSURE: 90 MMHG | TEMPERATURE: 97.7 F | WEIGHT: 245 LBS | BODY MASS INDEX: 35.07 KG/M2 | SYSTOLIC BLOOD PRESSURE: 157 MMHG | HEART RATE: 94 BPM | HEIGHT: 70 IN | OXYGEN SATURATION: 96 % | RESPIRATION RATE: 28 BRPM

## 2021-01-12 DIAGNOSIS — L50.9 HIVES: ICD-10-CM

## 2021-01-12 DIAGNOSIS — R42 LIGHT-HEADED FEELING: ICD-10-CM

## 2021-01-12 DIAGNOSIS — Z01.89 ENCOUNTER FOR IMAGING TO SCREEN FOR METAL PRIOR TO MAGNETIC RESONANCE IMAGING (MRI): ICD-10-CM

## 2021-01-12 DIAGNOSIS — Z86.79 HISTORY OF HYPERTENSION: ICD-10-CM

## 2021-01-12 LAB
ANION GAP SERPL CALCULATED.3IONS-SCNC: 4 MMOL/L (ref 3–14)
BASOPHILS # BLD AUTO: 0.1 10E9/L (ref 0–0.2)
BASOPHILS NFR BLD AUTO: 0.8 %
BUN SERPL-MCNC: 30 MG/DL (ref 7–30)
CALCIUM SERPL-MCNC: 9.3 MG/DL (ref 8.5–10.1)
CHLORIDE SERPL-SCNC: 105 MMOL/L (ref 94–109)
CO2 SERPL-SCNC: 28 MMOL/L (ref 20–32)
CREAT SERPL-MCNC: 1.71 MG/DL (ref 0.66–1.25)
DIFFERENTIAL METHOD BLD: NORMAL
EOSINOPHIL # BLD AUTO: 0.1 10E9/L (ref 0–0.7)
EOSINOPHIL NFR BLD AUTO: 1.4 %
ERYTHROCYTE [DISTWIDTH] IN BLOOD BY AUTOMATED COUNT: 12.3 % (ref 10–15)
GFR SERPL CREATININE-BSD FRML MDRD: 40 ML/MIN/{1.73_M2}
GLUCOSE SERPL-MCNC: 209 MG/DL (ref 70–99)
HCT VFR BLD AUTO: 48.2 % (ref 40–53)
HGB BLD-MCNC: 16.3 G/DL (ref 13.3–17.7)
IMM GRANULOCYTES # BLD: 0 10E9/L (ref 0–0.4)
IMM GRANULOCYTES NFR BLD: 0.3 %
LYMPHOCYTES # BLD AUTO: 2 10E9/L (ref 0.8–5.3)
LYMPHOCYTES NFR BLD AUTO: 31.1 %
MCH RBC QN AUTO: 30.4 PG (ref 26.5–33)
MCHC RBC AUTO-ENTMCNC: 33.8 G/DL (ref 31.5–36.5)
MCV RBC AUTO: 90 FL (ref 78–100)
MONOCYTES # BLD AUTO: 0.7 10E9/L (ref 0–1.3)
MONOCYTES NFR BLD AUTO: 11.3 %
NEUTROPHILS # BLD AUTO: 3.6 10E9/L (ref 1.6–8.3)
NEUTROPHILS NFR BLD AUTO: 55.1 %
NRBC # BLD AUTO: 0 10*3/UL
NRBC BLD AUTO-RTO: 0 /100
PLATELET # BLD AUTO: 189 10E9/L (ref 150–450)
POTASSIUM SERPL-SCNC: 4 MMOL/L (ref 3.4–5.3)
RBC # BLD AUTO: 5.37 10E12/L (ref 4.4–5.9)
SODIUM SERPL-SCNC: 137 MMOL/L (ref 133–144)
TROPONIN I SERPL-MCNC: <0.015 UG/L (ref 0–0.04)
WBC # BLD AUTO: 6.6 10E9/L (ref 4–11)

## 2021-01-12 PROCEDURE — 99285 EMERGENCY DEPT VISIT HI MDM: CPT | Mod: 25 | Performed by: EMERGENCY MEDICINE

## 2021-01-12 PROCEDURE — 85025 COMPLETE CBC W/AUTO DIFF WBC: CPT | Performed by: EMERGENCY MEDICINE

## 2021-01-12 PROCEDURE — 70553 MRI BRAIN STEM W/O & W/DYE: CPT

## 2021-01-12 PROCEDURE — A9585 GADOBUTROL INJECTION: HCPCS | Performed by: EMERGENCY MEDICINE

## 2021-01-12 PROCEDURE — 84484 ASSAY OF TROPONIN QUANT: CPT | Performed by: EMERGENCY MEDICINE

## 2021-01-12 PROCEDURE — 255N000002 HC RX 255 OP 636: Performed by: EMERGENCY MEDICINE

## 2021-01-12 PROCEDURE — 250N000011 HC RX IP 250 OP 636: Performed by: EMERGENCY MEDICINE

## 2021-01-12 PROCEDURE — 70030 X-RAY EYE FOR FOREIGN BODY: CPT

## 2021-01-12 PROCEDURE — 96375 TX/PRO/DX INJ NEW DRUG ADDON: CPT | Performed by: EMERGENCY MEDICINE

## 2021-01-12 PROCEDURE — 93005 ELECTROCARDIOGRAM TRACING: CPT | Performed by: EMERGENCY MEDICINE

## 2021-01-12 PROCEDURE — 93010 ELECTROCARDIOGRAM REPORT: CPT | Performed by: EMERGENCY MEDICINE

## 2021-01-12 PROCEDURE — 80048 BASIC METABOLIC PNL TOTAL CA: CPT | Performed by: EMERGENCY MEDICINE

## 2021-01-12 PROCEDURE — 96374 THER/PROPH/DIAG INJ IV PUSH: CPT | Mod: 59 | Performed by: EMERGENCY MEDICINE

## 2021-01-12 RX ORDER — METHYLPREDNISOLONE SODIUM SUCCINATE 125 MG/2ML
125 INJECTION, POWDER, LYOPHILIZED, FOR SOLUTION INTRAMUSCULAR; INTRAVENOUS ONCE
Status: COMPLETED | OUTPATIENT
Start: 2021-01-12 | End: 2021-01-12

## 2021-01-12 RX ORDER — DIPHENHYDRAMINE HYDROCHLORIDE 50 MG/ML
12.5 INJECTION INTRAMUSCULAR; INTRAVENOUS ONCE
Status: COMPLETED | OUTPATIENT
Start: 2021-01-12 | End: 2021-01-12

## 2021-01-12 RX ORDER — GADOBUTROL 604.72 MG/ML
10 INJECTION INTRAVENOUS ONCE
Status: COMPLETED | OUTPATIENT
Start: 2021-01-12 | End: 2021-01-12

## 2021-01-12 RX ADMIN — GADOBUTROL 10 ML: 604.72 INJECTION INTRAVENOUS at 16:37

## 2021-01-12 RX ADMIN — METHYLPREDNISOLONE SODIUM SUCCINATE 125 MG: 125 INJECTION, POWDER, FOR SOLUTION INTRAMUSCULAR; INTRAVENOUS at 17:33

## 2021-01-12 RX ADMIN — DIPHENHYDRAMINE HYDROCHLORIDE 12.5 MG: 50 INJECTION, SOLUTION INTRAMUSCULAR; INTRAVENOUS at 17:32

## 2021-01-12 ASSESSMENT — MIFFLIN-ST. JEOR: SCORE: 1892.56

## 2021-01-12 ASSESSMENT — ENCOUNTER SYMPTOMS
HEMATOLOGIC/LYMPHATIC NEGATIVE: 1
MUSCULOSKELETAL NEGATIVE: 1
EYES NEGATIVE: 1
ALLERGIC/IMMUNOLOGIC NEGATIVE: 1
ENDOCRINE NEGATIVE: 1
PSYCHIATRIC NEGATIVE: 1
CARDIOVASCULAR NEGATIVE: 1
CONSTITUTIONAL NEGATIVE: 1
GASTROINTESTINAL NEGATIVE: 1
LIGHT-HEADEDNESS: 1
RESPIRATORY NEGATIVE: 1

## 2021-01-12 NOTE — ED AVS SNAPSHOT
[facility]  [address] [address]  Phone: [phone]  Fax: [phone]                                    Jean Claude Sneed   MRN: [id number]    Department: [facility] Emergency Dept   Date of Visit: 1/12/2021           After Visit Summary Signature Page    I have received my discharge instructions, and my questions have been answered. I have discussed any challenges I see with this plan with the nurse or doctor.    ..........................................................................................................................................  Patient/Patient Representative Signature      ..........................................................................................................................................  Patient Representative Print Name and Relationship to Patient    ..................................................               ................................................  Date                                   Time    ..........................................................................................................................................  Reviewed by Signature/Title    ...................................................              ..............................................  Date                                               Time          22EPIC Rev 08/18

## 2021-01-12 NOTE — DISCHARGE INSTRUCTIONS
1) Your evaluation today suggest that your symptoms over the last 8 months and more noticeably in the last 3 weeks is not clear.    2) we have discussed the role and importance of follow-up in clinic for additional evaluation and testing particularly if you have ongoing or recurrent symptoms.  Stress test may be beneficial    3) You had hives after MRI imaging today and is important to notify others in the future if you going to be receiving contrast dye for MRI imaging to describe your reaction and symptoms.    4) the cause of your symptoms not clear today you appear stable for discharge to home follow-up in clinic.  If you develop new symptoms of concern including but not limited to chest pain or tightness, shortness of breath, palpitations or dyspnea on exertion you should return to be reevaluated or call 911.

## 2021-01-12 NOTE — ED NOTES
"Pt states he slept wrong on his left shoulder and feels as though his pain (chest) is related to that - denies any pain today. Patient also expresses fleeting thoughts of hurting himself - states, \"I don't want to talk to anyone about that today\" - will update care team.   "

## 2021-01-12 NOTE — ED NOTES
"Walked patient around the ER three times to ambulate; patient did very well. Purposeful gait, denied difficulty breathing, required no assistance getting around. Patient said that he felt a little disorganized but other than that \"felt great\".   "

## 2021-01-12 NOTE — ED TRIAGE NOTES
Patient arrived ambulatory to the Emergency Department. Patient reports that he has been having blood pressure problems for the past 6-8 months. Patient reports that his blood pressure has been around 159/80s and increased heart rate. Patient reports that his blood pressure is high like this prior to taking his blood pressure medications in the AM and PM. Patient reports that post medication his blood pressure is lowered. Patient reports that lately his blood pressure has been elevated throughout the day as well. Patient reports that he noticed that his blood pressure was high today around noon, 159/89 with a heart rate in the 80s. Patient reports that when is blood pressure is elevated like this he feels like his coordination is off when ambulating.

## 2021-01-12 NOTE — ED NOTES
"Pt back from MRI and developed hive/welt above lt eye and a few on abd.  Pt denies difficulty breathing but stated when he was in MRI his \"face felt like I got into some itch weed.\"  Burning in face and eyes were \"gritty.\"  MD in room.  "

## 2021-01-20 ENCOUNTER — OFFICE VISIT (OUTPATIENT)
Dept: FAMILY MEDICINE | Facility: CLINIC | Age: 66
End: 2021-01-20
Payer: COMMERCIAL

## 2021-01-20 VITALS
RESPIRATION RATE: 18 BRPM | BODY MASS INDEX: 34.57 KG/M2 | HEART RATE: 94 BPM | TEMPERATURE: 99 F | OXYGEN SATURATION: 94 % | DIASTOLIC BLOOD PRESSURE: 90 MMHG | HEIGHT: 70 IN | WEIGHT: 241.5 LBS | SYSTOLIC BLOOD PRESSURE: 140 MMHG

## 2021-01-20 DIAGNOSIS — H66.3X3 CHRONIC SUPPURATIVE OTITIS MEDIA OF BOTH EARS, UNSPECIFIED OTITIS MEDIA LOCATION: Primary | ICD-10-CM

## 2021-01-20 PROCEDURE — 99213 OFFICE O/P EST LOW 20 MIN: CPT | Performed by: FAMILY MEDICINE

## 2021-01-20 ASSESSMENT — MIFFLIN-ST. JEOR: SCORE: 1868.75

## 2021-01-20 NOTE — PROGRESS NOTES
Subjective     Jean Claude is a 65 year old who presents to clinic today for the following health issues     HPI     Concern - Ear problem and unsteady on feet  Onset: about 3 days ago for ears. Unsteady on feet about 6 - 8 years ago  Description: ringing and buzzing in ears all the time. Feels like theres a lot of wax in both ears. Left is worse than right. Feels off balance when ears are plugged. Could be related.   Intensity: severe ears. Moderate for gait  Progression of Symptoms:  Worsening for buzzing.   Accompanying Signs & Symptoms: see above  Previous history of similar problem: both problems have been happening for a long time  Precipitating factors:        Worsened by: laying down at night  Alleviating factors:        Improved by: warm shower  Therapies tried and outcome: tried tylenol for ear buzzing that relieves it.        Current Outpatient Medications   Medication Instructions     acetaminophen (TYLENOL) 500-1,000 mg, Oral, EVERY 6 HOURS PRN, Will take PRN if headache. Patient takes half of the 500 MG tablet.     albuterol (PROAIR HFA/PROVENTIL HFA/VENTOLIN HFA) 108 (90 Base) MCG/ACT inhaler 2 puffs, Inhalation, EVERY 4 HOURS PRN     allopurinol (ZYLOPRIM) 100 mg, Oral, DAILY     amoxicillin-clavulanate (AUGMENTIN) 875-125 MG tablet 1 tablet, Oral, 2 TIMES DAILY     aspirin (ASA) 81 mg, Oral, DAILY     atorvastatin (LIPITOR) 20 mg, Oral, DAILY, Patient takes 1/2 tablet daily     fluticasone (FLONASE) 50 MCG/ACT nasal spray 2 sprays, Both Nostrils, DAILY PRN, For nasal congestion     hydrochlorothiazide (HYDRODIURIL) 25 mg, Oral, EVERY MORNING     lisinopril (ZESTRIL) 20 mg, Oral, DAILY     omeprazole (PRILOSEC) 40 mg, Oral, EVERY EVENING       Patient Active Problem List   Diagnosis     Essential hypertension, benign     Tobacco abuse     Hyperlipidemia LDL goal <130     Chronic kidney disease, stage 2 (mild)     Prediabetes       Blood pressure (!) 140/90, pulse 94, temperature 99  F (37.2  C),  "temperature source Tympanic, resp. rate 18, height 1.765 m (5' 9.5\"), weight 109.5 kg (241 lb 8 oz), SpO2 94 %.    Exam:  GENERAL APPEARANCE: healthy, alert and no distress  EYES: EOMI,  PERRL  HENT: nose and mouth without ulcers or lesions and TM congested/bulging bilateral  NECK: no adenopathy, no asymmetry, masses, or scars and thyroid normal to palpation  PSYCH: mentation appears normal and affect normal/bright      (H66.3X3) Chronic suppurative otitis media of both ears, unspecified otitis media location  (primary encounter diagnosis)  Comment:   Plan: amoxicillin-clavulanate (AUGMENTIN) 875-125 MG         tablet, OTOLARYNGOLOGY REFERRAL        We discussed the ear infections on each side, and he will start Augmentin at 875 mg twice daily for 10 days.   Call if any side effects or if not better. Call [phone] for the ENT doctor appt. [place].   If this is the cause of the balance and hearing then the treatment may help all these.     Abram Teague MD          "

## 2021-01-20 NOTE — PATIENT INSTRUCTIONS
(H66.3X3) Chronic suppurative otitis media of both ears, unspecified otitis media location  (primary encounter diagnosis)  Comment:   Plan: amoxicillin-clavulanate (AUGMENTIN) 875-125 MG         tablet, OTOLARYNGOLOGY REFERRAL        We discussed the ear infections on each side, and he will start Augmentin at 875 mg twice daily for 10 days.   Call if any side effects or if not better. Call [phone] for the ENT doctor appt. [place].   If this is the cause of the balance and hearing then the treatment may help all these.

## 2021-02-01 ENCOUNTER — TELEPHONE (OUTPATIENT)
Dept: FAMILY MEDICINE | Facility: CLINIC | Age: 66
End: 2021-02-01

## 2021-02-01 DIAGNOSIS — I10 ESSENTIAL HYPERTENSION, BENIGN: ICD-10-CM

## 2021-02-01 RX ORDER — LISINOPRIL 40 MG/1
40 TABLET ORAL EVERY EVENING
Qty: 90 TABLET | Refills: 3 | Status: ON HOLD | OUTPATIENT
Start: 2021-02-01 | End: 2021-08-09

## 2021-02-01 NOTE — TELEPHONE ENCOUNTER
Patient notified and reiterated that he'll just take 1 tab now instead of 2.  Understanding voiced by patient.    Wendy Burton RN  [facility]

## 2021-02-01 NOTE — TELEPHONE ENCOUNTER
Reason for Call:  Other call back    Detailed comments: Patient called and is asking for a explanation on why his Lisinopril  Was renewed and he drop from taking 40 MG a day to 20MG a day.    Phone Number Patient can be reached at: Home number on file [phone] (home)    Best Time:     Can we leave a detailed message on this number? YES    Call taken on 2/1/2021 at 8:25 AM by Muna Tavera

## 2021-02-01 NOTE — TELEPHONE ENCOUNTER
Patient is taking 40 mg Lisinopril daily. His blood pressure today, as reported by patient is 133/71. His dose this morning was 40mg. He has received a 90 day supply of 20 mg tablets. He is instructed to take 2 tablets to equal a dose of 40 mg daily.  Cassie HOWELL RN

## 2021-02-01 NOTE — TELEPHONE ENCOUNTER
Dr. Teague,    Patient is asking why his lisinopril was changed from 40 mg to 20 mg. Nothing noted in Vritual visit notes.  Please advise. Yvonne MANCERA RN

## 2021-02-01 NOTE — TELEPHONE ENCOUNTER
At his visit with me on 1-20-21 the dose of the med, Lisinopril, was listed as 20 mg daily. It was refilled from that dose. If he is really on 40 mg daily let me know and I will reorder that dose.     Abram Teague MD

## 2021-07-21 ENCOUNTER — HOSPITAL ENCOUNTER (EMERGENCY)
Facility: CLINIC | Age: 66
Discharge: HOME | End: 2021-07-21
Attending: PHYSICIAN ASSISTANT | Admitting: PHYSICIAN ASSISTANT
Payer: COMMERCIAL

## 2021-07-21 VITALS
HEIGHT: 70 IN | RESPIRATION RATE: 18 BRPM | SYSTOLIC BLOOD PRESSURE: 134 MMHG | DIASTOLIC BLOOD PRESSURE: 86 MMHG | TEMPERATURE: 98.3 F | OXYGEN SATURATION: 96 % | HEART RATE: 97 BPM | BODY MASS INDEX: 34.65 KG/M2 | WEIGHT: 242 LBS

## 2021-07-21 DIAGNOSIS — H66.002 ACUTE SUPPURATIVE OTITIS MEDIA OF LEFT EAR WITHOUT SPONTANEOUS RUPTURE OF TYMPANIC MEMBRANE, RECURRENCE NOT SPECIFIED: ICD-10-CM

## 2021-07-21 PROCEDURE — 99214 OFFICE O/P EST MOD 30 MIN: CPT | Performed by: PHYSICIAN ASSISTANT

## 2021-07-21 PROCEDURE — G0463 HOSPITAL OUTPT CLINIC VISIT: HCPCS | Performed by: PHYSICIAN ASSISTANT

## 2021-07-21 RX ORDER — CEFUROXIME AXETIL 250 MG/1
250 TABLET ORAL 2 TIMES DAILY
Qty: 20 TABLET | Refills: 0 | Status: SHIPPED | OUTPATIENT
Start: 2021-07-21 | End: 2021-07-31

## 2021-07-21 ASSESSMENT — MIFFLIN-ST. JEOR: SCORE: 1869.98

## 2021-07-21 ASSESSMENT — ENCOUNTER SYMPTOMS
FACIAL SWELLING: 0
CONSTITUTIONAL NEGATIVE: 1
SORE THROAT: 0
CARDIOVASCULAR NEGATIVE: 1
EYES NEGATIVE: 1
SINUS PRESSURE: 0
SINUS PAIN: 0
VOICE CHANGE: 0
RHINORRHEA: 0
RESPIRATORY NEGATIVE: 1
TROUBLE SWALLOWING: 0
GASTROINTESTINAL NEGATIVE: 1

## 2021-07-21 NOTE — ED PROVIDER NOTES
"  History     Chief Complaint   Patient presents with     Otalgia     L     HPI  Jean Claude Sneed is a 66 year old male with a past medical history of diabetes type 2, intermittent asthma, hypertension, high cholesterol and chronic kidney disease stage II who presents with left ear pain which began 2 days ago.  The patient denies having any other URI symptoms today.  He has had no drainage or blood from the ear.  However he does have a history of ear infections, about once per year and recurrent cerumen impaction.  States that he has not been exposed to high altitudes or had the ear underwater.  He has no pain or redness around or behind the ear.No concerns for breathing or swallowing, chest pain, shortness of breath, abdominal pain, joint pain or rashes, headaches, acute vision changes, nausea or vomiting, constipation or diarrhea.    Allergies:  Allergies   Allergen Reactions     Beta Adrenergic Blockers Other (See Comments)     Hands arms and feet cold      Gadavist [Gadobutrol] Hives and Itching     Patient developed hives on forehead and anterior abdomen with itchiness of eyes and face within 30 seconds of Gadavist contrast injection. Symptoms started feeling better within about 15 minutes of injection.         Gadavist - 10 mL - Lot #: [id number] - NDC 39877-410-10     Azelastine Other (See Comments) and Dizziness     \"shaky\" and sick to stomach     Ciprofloxacin Other (See Comments) and Dizziness     and sick to stomach     Diltiazem Other (See Comments)     Blood pressure up and pulse up then down      Ibuprofen Other (See Comments)     Hypertension     Naproxen Other (See Comments)     Feels sick, like coming down with the stomach flu     Citalopram Anxiety and Other (See Comments)     Agitation, Side effects started in less than an hour after first pill       Problem List:    Patient Active Problem List    Diagnosis Date Noted     Hyperlipidemia LDL goal <130 03/19/2020     Priority: Medium     Chronic kidney " disease, stage 2 (mild) 2020     Priority: Medium     Prediabetes 2020     Priority: Medium     Essential hypertension, benign 2012     Priority: Medium     Tobacco abuse 2012     Priority: Medium     Quit in !!!          Past Medical History:    Past Medical History:   Diagnosis Date     HTN (hypertension)        Past Surgical History:    Past Surgical History:   Procedure Laterality Date     left knee surgery         Family History:    Family History   Problem Relation Age of Onset     Hypertension Mother      Hypertension Father      Hypertension Brother      Hypertension Brother      Hypertension Brother      Hypertension Brother      Cancer Brother         prostate     Hypertension Brother      Hypertension Brother      Diabetes Brother      Cancer Brother      Hypertension Brother      Cancer Brother         prostate       Social History:  Marital Status:   [5]  Social History     Tobacco Use     Smoking status: Former Smoker     Packs/day: 0.50     Years: 30.00     Pack years: 15.00     Types: Cigarettes     Quit date: 2014     Years since quittin.5     Smokeless tobacco: Never Used   Substance Use Topics     Alcohol use: Yes     Comment: occassional wine     Drug use: Not Currently        Medications:    cefuroxime (CEFTIN) 250 MG tablet  empagliflozin (JARDIANCE) 25 MG TABS tablet  acetaminophen (TYLENOL) 500 MG tablet  albuterol (PROAIR HFA/PROVENTIL HFA/VENTOLIN HFA) 108 (90 Base) MCG/ACT inhaler  allopurinol (ZYLOPRIM) 100 MG tablet  aspirin 81 MG EC tablet  atorvastatin (LIPITOR) 20 MG tablet  fluticasone (FLONASE) 50 MCG/ACT nasal spray  hydrochlorothiazide (HYDRODIURIL) 25 MG tablet  lisinopril (ZESTRIL) 40 MG tablet  omeprazole (PRILOSEC) 40 MG DR capsule          Review of Systems   Constitutional: Negative.    HENT: Positive for ear pain. Negative for congestion, dental problem, drooling, ear discharge, facial swelling, hearing loss, mouth sores,  "nosebleeds, postnasal drip, rhinorrhea, sinus pressure, sinus pain, sneezing, sore throat, tinnitus, trouble swallowing and voice change.    Eyes: Negative.    Respiratory: Negative.    Cardiovascular: Negative.    Gastrointestinal: Negative.        Physical Exam   BP: 134/86  Pulse: 97  Temp: 98.3  F (36.8  C)  Resp: 18  Height: 177.2 cm (5' 9.75\")  Weight: 109.8 kg (242 lb)  SpO2: 96 %      Physical Exam  Constitutional:       General: He is not in acute distress.     Appearance: Normal appearance. He is not ill-appearing, toxic-appearing or diaphoretic.   HENT:      Head: Normocephalic and atraumatic.      Right Ear: Tympanic membrane, ear canal and external ear normal. No drainage, swelling or tenderness. No middle ear effusion. There is no impacted cerumen. No mastoid tenderness. No PE tube. No hemotympanum. Tympanic membrane is not injected, scarred, perforated, erythematous, retracted or bulging.      Left Ear: Ear canal and external ear normal. No drainage, swelling or tenderness.  No middle ear effusion. There is no impacted cerumen. No mastoid tenderness. No PE tube. No hemotympanum. Tympanic membrane is injected, erythematous and bulging. Tympanic membrane is not scarred, perforated or retracted.      Nose: Nose normal. No congestion or rhinorrhea.      Mouth/Throat:      Mouth: Mucous membranes are moist.      Pharynx: Oropharynx is clear. No oropharyngeal exudate or posterior oropharyngeal erythema.   Eyes:      General:         Right eye: No discharge.         Left eye: No discharge.      Extraocular Movements: Extraocular movements intact.      Conjunctiva/sclera: Conjunctivae normal.   Cardiovascular:      Rate and Rhythm: Normal rate and regular rhythm.      Pulses: Normal pulses.      Heart sounds: Normal heart sounds. No murmur heard.     Pulmonary:      Effort: Pulmonary effort is normal. No respiratory distress.      Breath sounds: Normal breath sounds. No stridor. No wheezing, rhonchi or rales. "   Chest:      Chest wall: No tenderness.   Musculoskeletal:      Cervical back: Normal range of motion and neck supple. No rigidity. No muscular tenderness.   Lymphadenopathy:      Cervical: No cervical adenopathy.   Skin:     General: Skin is warm and dry.      Findings: No erythema or rash.   Neurological:      General: No focal deficit present.      Mental Status: He is alert and oriented to person, place, and time.      Sensory: No sensory deficit.      Motor: No weakness.      Coordination: Coordination normal.      Gait: Gait normal.   Psychiatric:         Mood and Affect: Mood normal.         Behavior: Behavior normal.         Thought Content: Thought content normal.         Judgment: Judgment normal.         ED Course        Procedures                No results found for this or any previous visit (from the past 24 hour(s)).    Medications - No data to display    Assessments & Plan (with Medical Decision Making)   The patient is a 66 year old male with a past medical history of diabetes type 2, intermittent asthma, hypertension, high cholesterol and chronic kidney disease stage II who presents with left ear pain which began 2 days ago.  No other URI symptoms today.  The patient has not had any bleeding or drainage from the left ear, no pain with manipulation of the external ear, no mastoid tenderness.  No acute changes with hearing.    Starting the patient on Ceftin considering he had some lightheadedness and stomach irritation when taking amoxicillin or Augmentin in the past.  States that he was seen and evaluated at an urgent care about 1 year ago and was started on amoxicillin or Augmentin, but was unable to finish it due to side effects.  Recommended that he take his medication with food.  Also recommended taking a probiotic in between doses of antibiotic to promote digestive health.    The patient's presentation and physical exam are consistent with acute otitis media. Symptomatic cares discussed  include tylenol/ibuprofen and warm compresses (warm pack heated to body temperature, warm wash cloth) 4 times per day. Follow up with pcp if no improvement in 3 days if symptoms persist. Seek urgent medical evaluation if there are new or worsening symptoms such as high fever, chest tightness, wheezing, facial pressure, headaches, pain/redness/swelling behind the ears or around the eyes, trouble breathing, or trouble swallowing. Pt/guardian verbalized understanding and agrees with the treatment plan.         I have reviewed the nursing notes.    I have reviewed the findings, diagnosis, plan and need for follow up with the patient.    Discharge Medication List as of 7/21/2021 12:25 PM      START taking these medications    Details   cefuroxime (CEFTIN) 250 MG tablet Take 1 tablet (250 mg) by mouth 2 times daily for 10 days, Disp-20 tablet, R-0, E-Prescribe             Final diagnoses:   Acute suppurative otitis media of left ear without spontaneous rupture of tympanic membrane, recurrence not specified       7/21/2021   [facility] EMERGENCY DEPT     Juan Francisco Krishnamurthy PA-C  07/21/21 2079

## 2021-08-04 ENCOUNTER — APPOINTMENT (OUTPATIENT)
Dept: CT IMAGING | Facility: CLINIC | Age: 66
DRG: 872 | End: 2021-08-04
Attending: EMERGENCY MEDICINE
Payer: COMMERCIAL

## 2021-08-04 ENCOUNTER — HOSPITAL ENCOUNTER (INPATIENT)
Facility: CLINIC | Age: 66
LOS: 5 days | Discharge: HOME | DRG: 872 | End: 2021-08-09
Attending: EMERGENCY MEDICINE | Admitting: INTERNAL MEDICINE
Payer: COMMERCIAL

## 2021-08-04 DIAGNOSIS — Z11.52 ENCOUNTER FOR SCREENING LABORATORY TESTING FOR SEVERE ACUTE RESPIRATORY SYNDROME CORONAVIRUS 2 (SARS-COV-2): ICD-10-CM

## 2021-08-04 DIAGNOSIS — Z87.11 PERSONAL HISTORY OF PEPTIC ULCER DISEASE: ICD-10-CM

## 2021-08-04 DIAGNOSIS — K64.4 EXTERNAL HEMORRHOIDS: ICD-10-CM

## 2021-08-04 DIAGNOSIS — K57.32 DIVERTICULITIS OF COLON: Primary | ICD-10-CM

## 2021-08-04 DIAGNOSIS — R73.03 PREDIABETES: ICD-10-CM

## 2021-08-04 DIAGNOSIS — Z72.0 TOBACCO ABUSE: ICD-10-CM

## 2021-08-04 DIAGNOSIS — M1A.9XX0 CHRONIC GOUT WITHOUT TOPHUS, UNSPECIFIED CAUSE, UNSPECIFIED SITE: ICD-10-CM

## 2021-08-04 DIAGNOSIS — E78.5 HYPERLIPIDEMIA LDL GOAL <130: ICD-10-CM

## 2021-08-04 DIAGNOSIS — K21.9 GASTROESOPHAGEAL REFLUX DISEASE, UNSPECIFIED WHETHER ESOPHAGITIS PRESENT: ICD-10-CM

## 2021-08-04 DIAGNOSIS — N17.9 ACUTE KIDNEY INJURY (H): ICD-10-CM

## 2021-08-04 LAB
ALBUMIN SERPL-MCNC: 3.5 G/DL (ref 3.4–5)
ALBUMIN UR-MCNC: NEGATIVE MG/DL
ALP SERPL-CCNC: 149 U/L (ref 40–150)
ALT SERPL W P-5'-P-CCNC: 70 U/L (ref 0–70)
ANION GAP SERPL CALCULATED.3IONS-SCNC: 9 MMOL/L (ref 3–14)
APPEARANCE UR: CLEAR
AST SERPL W P-5'-P-CCNC: 62 U/L (ref 0–45)
BASOPHILS # BLD AUTO: 0.1 10E3/UL (ref 0–0.2)
BASOPHILS NFR BLD AUTO: 0 %
BILIRUB SERPL-MCNC: 1 MG/DL (ref 0.2–1.3)
BILIRUB UR QL STRIP: NEGATIVE
BUN SERPL-MCNC: 45 MG/DL (ref 7–30)
CALCIUM SERPL-MCNC: 9.8 MG/DL (ref 8.5–10.1)
CHLORIDE BLD-SCNC: 99 MMOL/L (ref 94–109)
CO2 SERPL-SCNC: 22 MMOL/L (ref 20–32)
COLOR UR AUTO: YELLOW
CREAT SERPL-MCNC: 2.35 MG/DL (ref 0.66–1.25)
EOSINOPHIL # BLD AUTO: 0 10E3/UL (ref 0–0.7)
EOSINOPHIL NFR BLD AUTO: 0 %
ERYTHROCYTE [DISTWIDTH] IN BLOOD BY AUTOMATED COUNT: 12.4 % (ref 10–15)
GFR SERPL CREATININE-BSD FRML MDRD: 27 ML/MIN/1.73M2
GLUCOSE BLD-MCNC: 138 MG/DL (ref 70–99)
GLUCOSE UR STRIP-MCNC: >499 MG/DL
HCT VFR BLD AUTO: 53.4 % (ref 40–53)
HGB BLD-MCNC: 18.4 G/DL (ref 13.3–17.7)
HGB UR QL STRIP: NEGATIVE
HOLD SPECIMEN: NORMAL
IMM GRANULOCYTES # BLD: 0.1 10E3/UL
IMM GRANULOCYTES NFR BLD: 1 %
KETONES UR STRIP-MCNC: NEGATIVE MG/DL
LACTATE SERPL-SCNC: 1.4 MMOL/L (ref 0.7–2)
LEUKOCYTE ESTERASE UR QL STRIP: NEGATIVE
LIPASE SERPL-CCNC: 96 U/L (ref 73–393)
LYMPHOCYTES # BLD AUTO: 0.8 10E3/UL (ref 0.8–5.3)
LYMPHOCYTES NFR BLD AUTO: 4 %
MCH RBC QN AUTO: 30.9 PG (ref 26.5–33)
MCHC RBC AUTO-ENTMCNC: 34.5 G/DL (ref 31.5–36.5)
MCV RBC AUTO: 90 FL (ref 78–100)
MONOCYTES # BLD AUTO: 1 10E3/UL (ref 0–1.3)
MONOCYTES NFR BLD AUTO: 5 %
MUCOUS THREADS #/AREA URNS LPF: PRESENT /LPF
NEUTROPHILS # BLD AUTO: 17.6 10E3/UL (ref 1.6–8.3)
NEUTROPHILS NFR BLD AUTO: 90 %
NITRATE UR QL: NEGATIVE
NRBC # BLD AUTO: 0 10E3/UL
NRBC BLD AUTO-RTO: 0 /100
PH UR STRIP: 5 [PH] (ref 5–7)
PLATELET # BLD AUTO: 218 10E3/UL (ref 150–450)
POTASSIUM BLD-SCNC: 4.7 MMOL/L (ref 3.4–5.3)
PROT SERPL-MCNC: 8.5 G/DL (ref 6.8–8.8)
RBC # BLD AUTO: 5.96 10E6/UL (ref 4.4–5.9)
RBC URINE: 0 /HPF
SARS-COV-2 RNA RESP QL NAA+PROBE: NEGATIVE
SODIUM SERPL-SCNC: 130 MMOL/L (ref 133–144)
SP GR UR STRIP: 1.01 (ref 1–1.03)
UROBILINOGEN UR STRIP-MCNC: NORMAL MG/DL
WBC # BLD AUTO: 19.8 10E3/UL (ref 4–11)
WBC URINE: 0 /HPF

## 2021-08-04 PROCEDURE — 99285 EMERGENCY DEPT VISIT HI MDM: CPT | Mod: 25 | Performed by: EMERGENCY MEDICINE

## 2021-08-04 PROCEDURE — 96367 TX/PROPH/DG ADDL SEQ IV INF: CPT | Performed by: EMERGENCY MEDICINE

## 2021-08-04 PROCEDURE — 120N000001 HC R&B MED SURG/OB

## 2021-08-04 PROCEDURE — 250N000011 HC RX IP 250 OP 636: Performed by: EMERGENCY MEDICINE

## 2021-08-04 PROCEDURE — C9803 HOPD COVID-19 SPEC COLLECT: HCPCS | Performed by: EMERGENCY MEDICINE

## 2021-08-04 PROCEDURE — 81001 URINALYSIS AUTO W/SCOPE: CPT | Performed by: EMERGENCY MEDICINE

## 2021-08-04 PROCEDURE — 36415 COLL VENOUS BLD VENIPUNCTURE: CPT | Performed by: EMERGENCY MEDICINE

## 2021-08-04 PROCEDURE — 96375 TX/PRO/DX INJ NEW DRUG ADDON: CPT | Performed by: EMERGENCY MEDICINE

## 2021-08-04 PROCEDURE — 258N000003 HC RX IP 258 OP 636: Performed by: EMERGENCY MEDICINE

## 2021-08-04 PROCEDURE — 96365 THER/PROPH/DIAG IV INF INIT: CPT | Performed by: EMERGENCY MEDICINE

## 2021-08-04 PROCEDURE — 83605 ASSAY OF LACTIC ACID: CPT | Performed by: EMERGENCY MEDICINE

## 2021-08-04 PROCEDURE — 87635 SARS-COV-2 COVID-19 AMP PRB: CPT | Performed by: EMERGENCY MEDICINE

## 2021-08-04 PROCEDURE — 99285 EMERGENCY DEPT VISIT HI MDM: CPT | Performed by: EMERGENCY MEDICINE

## 2021-08-04 PROCEDURE — 74176 CT ABD & PELVIS W/O CONTRAST: CPT

## 2021-08-04 PROCEDURE — 80053 COMPREHEN METABOLIC PANEL: CPT | Performed by: EMERGENCY MEDICINE

## 2021-08-04 PROCEDURE — 85025 COMPLETE CBC W/AUTO DIFF WBC: CPT | Performed by: EMERGENCY MEDICINE

## 2021-08-04 PROCEDURE — 83690 ASSAY OF LIPASE: CPT | Performed by: EMERGENCY MEDICINE

## 2021-08-04 RX ORDER — ONDANSETRON 2 MG/ML
4 INJECTION INTRAMUSCULAR; INTRAVENOUS ONCE
Status: COMPLETED | OUTPATIENT
Start: 2021-08-04 | End: 2021-08-04

## 2021-08-04 RX ORDER — CEFTRIAXONE SODIUM 1 G/50ML
1 INJECTION, SOLUTION INTRAVENOUS ONCE
Status: COMPLETED | OUTPATIENT
Start: 2021-08-04 | End: 2021-08-04

## 2021-08-04 RX ADMIN — METRONIDAZOLE 500 MG: 500 INJECTION, SOLUTION INTRAVENOUS at 21:28

## 2021-08-04 RX ADMIN — ONDANSETRON 4 MG: 2 INJECTION INTRAMUSCULAR; INTRAVENOUS at 20:44

## 2021-08-04 RX ADMIN — SODIUM CHLORIDE 1000 ML: 9 INJECTION, SOLUTION INTRAVENOUS at 20:30

## 2021-08-04 RX ADMIN — CEFTRIAXONE SODIUM 1 G: 1 INJECTION, SOLUTION INTRAVENOUS at 20:45

## 2021-08-04 NOTE — ED PROVIDER NOTES
"  History     Chief Complaint   Patient presents with     Abdominal Pain     HPI  Jean Claude Sneed is a 66 year old male who presents with abdominal pain waxing waning for the past week and a half.  He is not had such discomfort in the past.  Its constant progressively worsening.  No inciting trauma or strain.  Describes an aching sensation worse with certain movements, worse with eating.  Describes bowel movements as alternating between formed and loose, last one was today without black or bloody component.  His appetite is remained intact.  He describes central lower discomfort.  Developed a temp to 102 and shaking chills today.  Developed vomiting intermittently today as well, prior to this over the last week and a half he has had dry heaves.  No prior abdominal surgery.  He does ex-smoker, occasional alcohol no illicit drug use.  Nuys ill contacts.    Allergies:  Allergies   Allergen Reactions     Beta Adrenergic Blockers Other (See Comments)     Hands arms and feet cold      Gadavist [Gadobutrol] Hives and Itching     Patient developed hives on forehead and anterior abdomen with itchiness of eyes and face within 30 seconds of Gadavist contrast injection. Symptoms started feeling better within about 15 minutes of injection.         Gadavist - 10 mL - Lot #: [id number] - NDC [id number]     Azelastine Other (See Comments) and Dizziness     \"shaky\" and sick to stomach     Ciprofloxacin Other (See Comments) and Dizziness     and sick to stomach     Diltiazem Other (See Comments)     Blood pressure up and pulse up then down      Ibuprofen Other (See Comments)     Hypertension     Naproxen Other (See Comments)     Feels sick, like coming down with the stomach flu     Citalopram Anxiety and Other (See Comments)     Agitation, Side effects started in less than an hour after first pill       Problem List:    Patient Active Problem List    Diagnosis Date Noted     Diverticulitis of colon 08/04/2021     Priority: Medium     " Acute kidney injury (H) 2021     Priority: Medium     Hyperlipidemia LDL goal <130 2020     Priority: Medium     Chronic kidney disease, stage 2 (mild) 2020     Priority: Medium     Prediabetes 2020     Priority: Medium     Essential hypertension, benign 2012     Priority: Medium     Tobacco abuse 2012     Priority: Medium     Quit in !!!       PLMD (periodic limb movement disorder) 2011     Priority: Medium     Personal history of peptic ulcer disease 2007     Priority: Medium     Formatting of this note might be different from the original.  GI BLEED       Esophageal reflux 2007     Priority: Medium        Past Medical History:    Past Medical History:   Diagnosis Date     HTN (hypertension)        Past Surgical History:    Past Surgical History:   Procedure Laterality Date     left knee surgery         Family History:    Family History   Problem Relation Age of Onset     Hypertension Mother      Hypertension Father      Hypertension Brother      Hypertension Brother      Hypertension Brother      Hypertension Brother      Cancer Brother         prostate     Hypertension Brother      Hypertension Brother      Diabetes Brother      Cancer Brother      Hypertension Brother      Cancer Brother         prostate       Social History:  Marital Status:   [5]  Social History     Tobacco Use     Smoking status: Former Smoker     Packs/day: 0.50     Years: 30.00     Pack years: 15.00     Types: Cigarettes     Quit date: 2014     Years since quittin.5     Smokeless tobacco: Never Used   Substance Use Topics     Alcohol use: Yes     Comment: occassional wine     Drug use: Not Currently        Medications:    acetaminophen (TYLENOL) 500 MG tablet  albuterol (PROAIR HFA/PROVENTIL HFA/VENTOLIN HFA) 108 (90 Base) MCG/ACT inhaler  allopurinol (ZYLOPRIM) 100 MG tablet  aspirin 81 MG EC tablet  atorvastatin (LIPITOR) 20 MG tablet  empagliflozin (JARDIANCE)  25 MG TABS tablet  fluticasone (FLONASE) 50 MCG/ACT nasal spray  hydrochlorothiazide (HYDRODIURIL) 25 MG tablet  lisinopril (ZESTRIL) 40 MG tablet  omeprazole (PRILOSEC) 40 MG DR capsule          Review of Systems  All other systems reviewed and are negative.    Physical Exam   BP: 103/68  Pulse: (!) 125  Temp: 97.7  F (36.5  C)  Resp: 18  Weight: 108 kg (238 lb)  SpO2: 96 %      Physical Exam  Nontoxic appearing no respiratory distress alert and oriented ×3  Head atraumatic normocephalic  No cervical adenopathy   Neck supple full active painless range of motion  Lungs clear to auscultation  No CVA tenderness  Heart regular no murmur  Abdomen soft nondistended, tenderness left lower quadrant left pelvis and suprapubic voluntary guarding no rebound, bowel sounds present  Strength and sensation grossly intact throughout the extremities, gait and station normal  Speech is fluent, good eye contact, thought processes are rational  Lower extremities without swelling, redness or tenderness  Pedal pulses symmetrical and strong    ED Course        Procedures              Critical Care time:  None                 Results for orders placed or performed during the hospital encounter of 08/04/21 (from the past 24 hour(s))   UA with Microscopic reflex to Culture    Specimen: Urine, Midstream   Result Value Ref Range    Color Urine Yellow Colorless, Straw, Light Yellow, Yellow    Appearance Urine Clear Clear    Glucose Urine >499 (A) Negative mg/dL    Bilirubin Urine Negative Negative    Ketones Urine Negative Negative mg/dL    Specific Gravity Urine 1.014 1.003 - 1.035    Blood Urine Negative Negative    pH Urine 5.0 5.0 - 7.0    Protein Albumin Urine Negative Negative mg/dL    Urobilinogen Urine Normal Normal, 2.0 mg/dL    Nitrite Urine Negative Negative    Leukocyte Esterase Urine Negative Negative    Mucus Urine Present (A) None Seen /LPF    RBC Urine 0 <=2 /HPF    WBC Urine 0 <=5 /HPF    Narrative    Urine Culture not  indicated   [place] Draw    Narrative    The following orders were created for panel order [place] Draw.  Procedure                               Abnormality         Status                     ---------                               -----------         ------                     Extra Blue Top Tube[[id number]]                              Final result               Extra Red Top Tube[[id number]]                               Final result               Extra Green Top (Lithium...[[id number]]                      Final result               Extra Green Top (Lithium...[[id number]]                      Final result               Extra Purple Top Tube[[id number]]                            Final result                 Please view results for these tests on the individual orders.   Extra Blue Top Tube   Result Value Ref Range    Hold Specimen JIC    Extra Red Top Tube   Result Value Ref Range    Hold Specimen JIC    Extra Green Top (Lithium Heparin) Tube   Result Value Ref Range    Hold Specimen JIC    Extra Green Top (Lithium Heparin) Tube   Result Value Ref Range    Hold Specimen JIC    Extra Purple Top Tube   Result Value Ref Range    Hold Specimen JIC    CBC with platelets differential    Narrative    The following orders were created for panel order CBC with platelets differential.  Procedure                               Abnormality         Status                     ---------                               -----------         ------                     CBC with platelets and d...[[id number]]  Abnormal            Final result                 Please view results for these tests on the individual orders.   Comprehensive metabolic panel   Result Value Ref Range    Sodium 130 (L) 133 - 144 mmol/L    Potassium 4.7 3.4 - 5.3 mmol/L    Chloride 99 94 - 109 mmol/L    Carbon Dioxide (CO2) 22 20 - 32 mmol/L    Anion Gap 9 3 - 14 mmol/L    Urea Nitrogen 45 (H) 7 - 30 mg/dL    Creatinine 2.35 (H) 0.66 - 1.25 mg/dL    Calcium 9.8 8.5 -  10.1 mg/dL    Glucose 138 (H) 70 - 99 mg/dL    Alkaline Phosphatase 149 40 - 150 U/L    AST 62 (H) 0 - 45 U/L    ALT 70 0 - 70 U/L    Protein Total 8.5 6.8 - 8.8 g/dL    Albumin 3.5 3.4 - 5.0 g/dL    Bilirubin Total 1.0 0.2 - 1.3 mg/dL    GFR Estimate 27 (L) >60 mL/min/1.73m2   Lipase   Result Value Ref Range    Lipase 96 73 - 393 U/L   CBC with platelets and differential   Result Value Ref Range    WBC Count 19.8 (H) 4.0 - 11.0 10e3/uL    RBC Count 5.96 (H) 4.40 - 5.90 10e6/uL    Hemoglobin 18.4 (H) 13.3 - 17.7 g/dL    Hematocrit 53.4 (H) 40.0 - 53.0 %    MCV 90 78 - 100 fL    MCH 30.9 26.5 - 33.0 pg    MCHC 34.5 31.5 - 36.5 g/dL    RDW 12.4 10.0 - 15.0 %    Platelet Count 218 150 - 450 10e3/uL    % Neutrophils 90 %    % Lymphocytes 4 %    % Monocytes 5 %    % Eosinophils 0 %    % Basophils 0 %    % Immature Granulocytes 1 %    NRBCs per 100 WBC 0 <1 /100    Absolute Neutrophils 17.6 (H) 1.6 - 8.3 10e3/uL    Absolute Lymphocytes 0.8 0.8 - 5.3 10e3/uL    Absolute Monocytes 1.0 0.0 - 1.3 10e3/uL    Absolute Eosinophils 0.0 0.0 - 0.7 10e3/uL    Absolute Basophils 0.1 0.0 - 0.2 10e3/uL    Absolute Immature Granulocytes 0.1 (H) <=0.0 10e3/uL    Absolute NRBCs 0.0 10e3/uL   CT Abdomen Pelvis w/o Contrast    Narrative    EXAM: CT ABDOMEN PELVIS W/O CONTRAST  LOCATION: [facility]  DATE/TIME: 8/4/2021 7:16 PM    INDICATION: Diverticulitis suspected  COMPARISON: None.  TECHNIQUE: CT scan of the abdomen and pelvis was performed without IV contrast. Multiplanar reformats were obtained. Dose reduction techniques were used.  CONTRAST: None.    FINDINGS:   LOWER CHEST: Moderate calcification within the imaged portions of the mid and distal RCA. Lung bases are clear.    HEPATOBILIARY: Normal.    PANCREAS: Mild fatty replacement. Otherwise unremarkable.    SPLEEN: Normal.    ADRENAL GLANDS: Normal.    KIDNEYS/BLADDER: Normal.    BOWEL: Moderate acute sigmoid diverticulitis. Minimal underlying  diverticulosis. No free air. No significant free fluid. No bowel obstruction. Normal caliber appendix.    LYMPH NODES: Normal.    VASCULATURE: Mild focal abdominal aortic ectasia measuring 3 cm at the level of the NACHO origin. Diffuse atherosclerotic calcification of the abdominal aorta.    PELVIC ORGANS: Normal.    MUSCULOSKELETAL: Normal.      Impression    IMPRESSION:   1.  Moderate acute diverticulitis of the sigmoid colon.     Lactic acid whole blood   Result Value Ref Range    Lactic Acid 1.4 0.7 - 2.0 mmol/L   Asymptomatic COVID-19 Virus (Coronavirus) by PCR Nasopharyngeal    Specimen: Nasopharyngeal; Swab    Narrative    The following orders were created for panel order Asymptomatic COVID-19 Virus (Coronavirus) by PCR.  Procedure                               Abnormality         Status                     ---------                               -----------         ------                     SARS-COV2 (COVID-19) Vir...[[id number]]                                                   Please view results for these tests on the individual orders.       Medications   0.9% sodium chloride BOLUS (0 mLs Intravenous Stopped 8/4/21 2154)   cefTRIAXone in d5w (ROCEPHIN) intermittent infusion 1 g (0 g Intravenous Stopped 8/4/21 2117)   metroNIDAZOLE (FLAGYL) infusion 500 mg (0 mg Intravenous Stopped 8/4/21 2159)   ondansetron (ZOFRAN) injection 4 mg (4 mg Intravenous Given 8/4/21 2044)       Assessments & Plan (with Medical Decision Making)  1 1/2 weeks left lower quadrant pain and tenderness.  Vomiting the day prompts evaluation.  Findings consistent with diverticulitis both work-up as well as exam.  No evidence for complication at this time.  Patient tachycardic, evidence of acute kidney injury as well see labs above.  Patient is admitted to hospital for ongoing treatment, reviewed with Jayshree Knapp who accepts for hospitalist service     I have reviewed the nursing notes.    I have reviewed the findings, diagnosis,  plan and need for follow up with the patient.       New Prescriptions    No medications on file       Final diagnoses:   Diverticulitis of colon   Acute kidney injury (H)       8/4/2021   [facility] EMERGENCY DEPT     Yoni Sheridan MD  08/04/21 1072

## 2021-08-04 NOTE — ED TRIAGE NOTES
Abdominal pain and intermittent nausea after eating for approximately 1 1/2 weeks.  Patient denies pain with urination.  Regular bowel movements.

## 2021-08-05 PROBLEM — K64.4 EXTERNAL HEMORRHOIDS: Chronic | Status: ACTIVE | Noted: 2021-08-05

## 2021-08-05 PROBLEM — N18.9 ACUTE KIDNEY INJURY SUPERIMPOSED ON CHRONIC KIDNEY DISEASE (H): Status: ACTIVE | Noted: 2021-08-04

## 2021-08-05 LAB
ANION GAP SERPL CALCULATED.3IONS-SCNC: 8 MMOL/L (ref 3–14)
BUN SERPL-MCNC: 37 MG/DL (ref 7–30)
CALCIUM SERPL-MCNC: 8.4 MG/DL (ref 8.5–10.1)
CHLORIDE BLD-SCNC: 104 MMOL/L (ref 94–109)
CO2 SERPL-SCNC: 21 MMOL/L (ref 20–32)
CREAT SERPL-MCNC: 2.25 MG/DL (ref 0.66–1.25)
ERYTHROCYTE [DISTWIDTH] IN BLOOD BY AUTOMATED COUNT: 12.5 % (ref 10–15)
GFR SERPL CREATININE-BSD FRML MDRD: 29 ML/MIN/1.73M2
GLUCOSE BLD-MCNC: 116 MG/DL (ref 70–99)
GLUCOSE BLDC GLUCOMTR-MCNC: 105 MG/DL (ref 70–99)
GLUCOSE BLDC GLUCOMTR-MCNC: 112 MG/DL (ref 70–99)
GLUCOSE BLDC GLUCOMTR-MCNC: 141 MG/DL (ref 70–99)
GLUCOSE BLDC GLUCOMTR-MCNC: 153 MG/DL (ref 70–99)
GLUCOSE BLDC GLUCOMTR-MCNC: 196 MG/DL (ref 70–99)
HBA1C MFR BLD: 6.8 % (ref 0–5.6)
HCT VFR BLD AUTO: 45 % (ref 40–53)
HGB BLD-MCNC: 15.4 G/DL (ref 13.3–17.7)
HOLD SPECIMEN: NORMAL
LACTATE SERPL-SCNC: 1.2 MMOL/L (ref 0.7–2)
MCH RBC QN AUTO: 30.6 PG (ref 26.5–33)
MCHC RBC AUTO-ENTMCNC: 34.2 G/DL (ref 31.5–36.5)
MCV RBC AUTO: 89 FL (ref 78–100)
PLATELET # BLD AUTO: 181 10E3/UL (ref 150–450)
POTASSIUM BLD-SCNC: 4.3 MMOL/L (ref 3.4–5.3)
RBC # BLD AUTO: 5.04 10E6/UL (ref 4.4–5.9)
SODIUM SERPL-SCNC: 133 MMOL/L (ref 133–144)
WBC # BLD AUTO: 15.7 10E3/UL (ref 4–11)

## 2021-08-05 PROCEDURE — 250N000011 HC RX IP 250 OP 636: Performed by: INTERNAL MEDICINE

## 2021-08-05 PROCEDURE — 87040 BLOOD CULTURE FOR BACTERIA: CPT | Performed by: INTERNAL MEDICINE

## 2021-08-05 PROCEDURE — 83036 HEMOGLOBIN GLYCOSYLATED A1C: CPT | Performed by: INTERNAL MEDICINE

## 2021-08-05 PROCEDURE — 250N000013 HC RX MED GY IP 250 OP 250 PS 637: Performed by: INTERNAL MEDICINE

## 2021-08-05 PROCEDURE — 120N000001 HC R&B MED SURG/OB

## 2021-08-05 PROCEDURE — 250N000012 HC RX MED GY IP 250 OP 636 PS 637: Performed by: INTERNAL MEDICINE

## 2021-08-05 PROCEDURE — 99223 1ST HOSP IP/OBS HIGH 75: CPT | Mod: AI | Performed by: INTERNAL MEDICINE

## 2021-08-05 PROCEDURE — 83605 ASSAY OF LACTIC ACID: CPT | Performed by: INTERNAL MEDICINE

## 2021-08-05 PROCEDURE — 99207 PR APP CREDIT; MD BILLING SHARED VISIT: CPT | Performed by: INTERNAL MEDICINE

## 2021-08-05 PROCEDURE — 99207 PR CDG-CHARGE REQUIRED MANUAL ENTRY: CPT | Performed by: INTERNAL MEDICINE

## 2021-08-05 PROCEDURE — 36415 COLL VENOUS BLD VENIPUNCTURE: CPT | Performed by: INTERNAL MEDICINE

## 2021-08-05 PROCEDURE — 85027 COMPLETE CBC AUTOMATED: CPT | Performed by: INTERNAL MEDICINE

## 2021-08-05 PROCEDURE — C9113 INJ PANTOPRAZOLE SODIUM, VIA: HCPCS | Performed by: INTERNAL MEDICINE

## 2021-08-05 PROCEDURE — 258N000003 HC RX IP 258 OP 636: Performed by: INTERNAL MEDICINE

## 2021-08-05 PROCEDURE — 82374 ASSAY BLOOD CARBON DIOXIDE: CPT | Performed by: INTERNAL MEDICINE

## 2021-08-05 RX ORDER — SODIUM CHLORIDE 9 MG/ML
INJECTION, SOLUTION INTRAVENOUS CONTINUOUS
Status: DISCONTINUED | OUTPATIENT
Start: 2021-08-05 | End: 2021-08-06

## 2021-08-05 RX ORDER — NICOTINE POLACRILEX 4 MG
15-30 LOZENGE BUCCAL
Status: DISCONTINUED | OUTPATIENT
Start: 2021-08-05 | End: 2021-08-09 | Stop reason: HOSPADM

## 2021-08-05 RX ORDER — ONDANSETRON 4 MG/1
4 TABLET, ORALLY DISINTEGRATING ORAL EVERY 6 HOURS PRN
Status: DISCONTINUED | OUTPATIENT
Start: 2021-08-05 | End: 2021-08-09 | Stop reason: HOSPADM

## 2021-08-05 RX ORDER — ALBUTEROL SULFATE 90 UG/1
2 AEROSOL, METERED RESPIRATORY (INHALATION) EVERY 4 HOURS PRN
Status: DISCONTINUED | OUTPATIENT
Start: 2021-08-05 | End: 2021-08-09 | Stop reason: HOSPADM

## 2021-08-05 RX ORDER — ATORVASTATIN CALCIUM 10 MG/1
10 TABLET, FILM COATED ORAL DAILY
Status: DISCONTINUED | OUTPATIENT
Start: 2021-08-05 | End: 2021-08-09 | Stop reason: HOSPADM

## 2021-08-05 RX ORDER — ACETAMINOPHEN 325 MG/1
650 TABLET ORAL EVERY 6 HOURS PRN
Status: DISCONTINUED | OUTPATIENT
Start: 2021-08-05 | End: 2021-08-05

## 2021-08-05 RX ORDER — CEFTRIAXONE SODIUM 1 G/50ML
1 INJECTION, SOLUTION INTRAVENOUS EVERY 24 HOURS
Status: DISCONTINUED | OUTPATIENT
Start: 2021-08-05 | End: 2021-08-05

## 2021-08-05 RX ORDER — ACETAMINOPHEN 325 MG/1
650 TABLET ORAL EVERY 4 HOURS PRN
Status: DISCONTINUED | OUTPATIENT
Start: 2021-08-05 | End: 2021-08-09 | Stop reason: HOSPADM

## 2021-08-05 RX ORDER — LIDOCAINE 40 MG/G
CREAM TOPICAL
Status: DISCONTINUED | OUTPATIENT
Start: 2021-08-05 | End: 2021-08-09 | Stop reason: HOSPADM

## 2021-08-05 RX ORDER — ONDANSETRON 2 MG/ML
4 INJECTION INTRAMUSCULAR; INTRAVENOUS EVERY 6 HOURS PRN
Status: DISCONTINUED | OUTPATIENT
Start: 2021-08-05 | End: 2021-08-09 | Stop reason: HOSPADM

## 2021-08-05 RX ORDER — HEPARIN SODIUM 5000 [USP'U]/.5ML
5000 INJECTION, SOLUTION INTRAVENOUS; SUBCUTANEOUS EVERY 12 HOURS
Status: DISCONTINUED | OUTPATIENT
Start: 2021-08-05 | End: 2021-08-09 | Stop reason: HOSPADM

## 2021-08-05 RX ORDER — CEFTRIAXONE SODIUM 2 G/50ML
2 INJECTION, SOLUTION INTRAVENOUS EVERY 24 HOURS
Status: DISCONTINUED | OUTPATIENT
Start: 2021-08-05 | End: 2021-08-09 | Stop reason: HOSPADM

## 2021-08-05 RX ORDER — DEXTROSE MONOHYDRATE 25 G/50ML
25-50 INJECTION, SOLUTION INTRAVENOUS
Status: DISCONTINUED | OUTPATIENT
Start: 2021-08-05 | End: 2021-08-09 | Stop reason: HOSPADM

## 2021-08-05 RX ORDER — ACETAMINOPHEN 650 MG/1
650 SUPPOSITORY RECTAL EVERY 6 HOURS PRN
Status: DISCONTINUED | OUTPATIENT
Start: 2021-08-05 | End: 2021-08-09 | Stop reason: HOSPADM

## 2021-08-05 RX ORDER — ALLOPURINOL 100 MG/1
200 TABLET ORAL EVERY MORNING
Status: DISCONTINUED | OUTPATIENT
Start: 2021-08-05 | End: 2021-08-09 | Stop reason: HOSPADM

## 2021-08-05 RX ORDER — ASPIRIN 81 MG/1
81 TABLET ORAL DAILY
Status: DISCONTINUED | OUTPATIENT
Start: 2021-08-05 | End: 2021-08-09 | Stop reason: HOSPADM

## 2021-08-05 RX ORDER — ACETAMINOPHEN 650 MG/1
650 SUPPOSITORY RECTAL EVERY 6 HOURS PRN
Status: DISCONTINUED | OUTPATIENT
Start: 2021-08-05 | End: 2021-08-05

## 2021-08-05 RX ORDER — FLUTICASONE PROPIONATE 50 MCG
2 SPRAY, SUSPENSION (ML) NASAL DAILY PRN
Status: DISCONTINUED | OUTPATIENT
Start: 2021-08-05 | End: 2021-08-09 | Stop reason: HOSPADM

## 2021-08-05 RX ADMIN — ACETAMINOPHEN 650 MG: 325 TABLET, FILM COATED ORAL at 18:02

## 2021-08-05 RX ADMIN — PANTOPRAZOLE SODIUM 40 MG: 40 INJECTION, POWDER, FOR SOLUTION INTRAVENOUS at 09:03

## 2021-08-05 RX ADMIN — ACETAMINOPHEN 650 MG: 325 TABLET, FILM COATED ORAL at 22:29

## 2021-08-05 RX ADMIN — INSULIN ASPART 2 UNITS: 100 INJECTION, SOLUTION INTRAVENOUS; SUBCUTANEOUS at 13:37

## 2021-08-05 RX ADMIN — SODIUM CHLORIDE: 9 INJECTION, SOLUTION INTRAVENOUS at 03:03

## 2021-08-05 RX ADMIN — ACETAMINOPHEN 650 MG: 325 TABLET, FILM COATED ORAL at 03:04

## 2021-08-05 RX ADMIN — ACETAMINOPHEN 650 MG: 325 TABLET, FILM COATED ORAL at 08:56

## 2021-08-05 RX ADMIN — ONDANSETRON 4 MG: 2 INJECTION INTRAMUSCULAR; INTRAVENOUS at 08:51

## 2021-08-05 RX ADMIN — SODIUM CHLORIDE 1000 ML: 9 INJECTION, SOLUTION INTRAVENOUS at 04:15

## 2021-08-05 RX ADMIN — METRONIDAZOLE 500 MG: 500 INJECTION, SOLUTION INTRAVENOUS at 05:17

## 2021-08-05 RX ADMIN — ACETAMINOPHEN 650 MG: 325 TABLET, FILM COATED ORAL at 13:37

## 2021-08-05 RX ADMIN — HEPARIN SODIUM 5000 UNITS: 5000 INJECTION INTRAVENOUS; SUBCUTANEOUS at 03:04

## 2021-08-05 RX ADMIN — HEPARIN SODIUM 5000 UNITS: 5000 INJECTION INTRAVENOUS; SUBCUTANEOUS at 14:59

## 2021-08-05 RX ADMIN — METRONIDAZOLE 500 MG: 500 INJECTION, SOLUTION INTRAVENOUS at 13:36

## 2021-08-05 RX ADMIN — ONDANSETRON 4 MG: 2 INJECTION INTRAMUSCULAR; INTRAVENOUS at 18:00

## 2021-08-05 RX ADMIN — METRONIDAZOLE 500 MG: 500 INJECTION, SOLUTION INTRAVENOUS at 21:20

## 2021-08-05 RX ADMIN — ATORVASTATIN CALCIUM 10 MG: 10 TABLET, FILM COATED ORAL at 09:43

## 2021-08-05 RX ADMIN — ONDANSETRON 4 MG: 2 INJECTION INTRAMUSCULAR; INTRAVENOUS at 03:04

## 2021-08-05 RX ADMIN — ALLOPURINOL 200 MG: 100 TABLET ORAL at 09:43

## 2021-08-05 RX ADMIN — ASPIRIN 81 MG: 81 TABLET, COATED ORAL at 09:43

## 2021-08-05 RX ADMIN — CEFTRIAXONE SODIUM 2 G: 2 INJECTION, SOLUTION INTRAVENOUS at 09:40

## 2021-08-05 ASSESSMENT — ACTIVITIES OF DAILY LIVING (ADL)
DIFFICULTY_COMMUNICATING: NO
THE_FOLLOWING_AIDS_WERE_PROVIDED;: PATIENT DECLINED OFFER OF AUXILIARY AIDS
ADLS_ACUITY_SCORE: 14
TOILETING_ISSUES: NO
USE_OF_HEARING_ASSISTIVE_DEVICES: BILATERAL HEARING AIDS
ADLS_ACUITY_SCORE: 14
FALL_HISTORY_WITHIN_LAST_SIX_MONTHS: NO
HEARING_DIFFICULTY_OR_DEAF: YES
ADLS_ACUITY_SCORE: 14
WALKING_OR_CLIMBING_STAIRS_DIFFICULTY: NO
WEAR_GLASSES_OR_BLIND: YES
HEARING_MANAGEMENT: BILATERAL HEARING AIDS
DRESSING/BATHING_DIFFICULTY: NO
ADLS_ACUITY_SCORE: 14
VISION_MANAGEMENT: GLASSES
DESCRIBE_HEARING_LOSS: BILATERAL HEARING LOSS
CONCENTRATING,_REMEMBERING_OR_MAKING_DECISIONS_DIFFICULTY: NO
WERE_AUXILIARY_AIDS_OFFERED?: NO
DIFFICULTY_EATING/SWALLOWING: NO
ADLS_ACUITY_SCORE: 14
PATIENT'S_PREFERRED_MEANS_OF_COMMUNICATION: ENGLISH SPEAKER WITH HEARING LOSS, NO SPEECH PROBLEMS.
DOING_ERRANDS_INDEPENDENTLY_DIFFICULTY: NO

## 2021-08-05 NOTE — PROGRESS NOTES
[place] [facility] ADMISSION NOTE    Patient admitted to room 2310 at approximately 0300 via wheel chair from emergency room. Patient was accompanied by transport tech.     Verbal SBAR report received from AMPARO Pineda prior to patient arrival.     Patient ambulated to bed with stand-by assist. Patient alert and oriented X 3. Pain is controlled without any medications.  . Admission vital signs: Blood pressure 130/68, pulse 105, temperature (!) 100.7  F (38.2  C), temperature source Oral, resp. rate (!) 34, weight 108.7 kg (239 lb 10.2 oz), SpO2 91 %. Patient was oriented to plan of care, call light, bed controls, tv, telephone, bathroom and visiting hours.     Risk Assessment    The following safety risks were identified during admission: fall and isolation. Yellow risk band applied: NO.     Skin Initial Assessment    This writer admitted this patient and completed a full skin assessment and Leif score in the Adult PCS flowsheet. Appropriate interventions initiated as needed.     Secondary skin check declined by patient.    Leif Risk Assessment  Sensory Perception: 4-->no impairment  Moisture: 4-->rarely moist  Activity: 4-->walks frequently  Mobility: 4-->no limitation  Nutrition: 3-->adequate  Friction and Shear: 3-->no apparent problem  Leif Score: 22  Mattress: Standard Hospital Mattress (Foam)  Bed Frame: Standard width and length    Education    Patient has a [place] to Observation order: No  Observation education completed and documented: N/A      Mandi Vazquez RN

## 2021-08-05 NOTE — ED NOTES
Pt presents to ED with c/o abdominal pain x 1.5 weeks. Pain located lower quadrants/pelvic area. Pain worse after eating. Pt states minimal emesis until today, had two episodes of vomiting. Pt reports chills this morning, temperature at home 102 F. Pt took tylenol this morning.

## 2021-08-05 NOTE — PLAN OF CARE
Patient is alert and oriented.  Febrile this morning up to 103.1 with rigors, tylenol given.  Currently 98.9.  Receiving IV rocephin and flagyl, IV fluids infusing between antibiotics.  Denies abdominal pain today.  Did have some nausea and dry heaves this morning, zofran given, resolved at this time.  Tolerating clear liquid diet.  Up with sba in room.  Voiding.

## 2021-08-05 NOTE — H&P
[facility]    History and Physical - Hospitalist Service       Date of Admission:  8/4/2021    Assessment & Plan    Diverticulitis of colon  IV rocephin, flagyl, fluids  Lactic acid level is 1.4  CT abdomen showed Moderate acute diverticulitis of the sigmoid colon.  His last colonoscopy was normal 2 years ago.    Acute kidney injury superimposed on chronic kidney disease (H)  IV fluids  Hydrochlorothiazide and Lisinopril are held    Prediabetes  Insulin sliding scale  He is on Empagliflozin at home.    Hyperlipidemia LDL goal <130  Statin when he is able to take po    Tobacco abuse  As needed bronchodilators.  He reports that he quit smoking 8 years ago.    Essential hypertension, benign  hydrochlorothiazide and lisinopril are on hold  PRN Labetalol    Personal history of peptic ulcer disease  Change protonix to iv daily when he is nothing by mouth    External hemorrhoids  Preparation H cream                Diet: NPO for Medical/Clinical Reasons Except for: Meds, Ice Chips    DVT Prophylaxis: Heparin SQ  Tate Catheter: Not present  Central Lines: None  Code Status: Full Code      Clinically Significant Risk Factors Present on Admission   Acute diverticulitis  Acute on chronic renal failure      # Hyponatremia: Na = 130 mmol/L (Ref range: 133 - 144 mmol/L) on admission, will monitor as appropriate      # Platelet Defect: home medication list includes an antiplatelet medication      Disposition Plan   Expected discharge: more than 2 midnight. recommended to prior living arrangement once acute diverticulitis resolves.       Yasoda Meesala, MD  [facility]  Securely message with the Vocera Web Console (learn more here)  Text page via [employer] Paging/Directory      ______________________________________________________________________    Chief Complaint   Abdominal pain    History is obtained from the patient    History of Present Illness   Jean Claude Sneed is a  66-year-old male patient with a history of hypertension, diabetes, hyperlipidemia, CKD presented with 1 1/2 week history of lower abdominal pain. it is sharp in nature, intermittent. It is non radiating. It is worse with eating. There are no relieving factors. He noticed fever up to 102 associated with the chills today. His CT abdomen pelvis showed moderate acute sigmoid diverticulitis. He has Leukocytosis of 19. His lactic acid level is 1.4. he has nausea and vomiting. He denies diarrhea. He is being admitted for acute diverticulitis. He is kept on rocephin and flagyl. His last colonoscopy was normal two years ago.    He is also found to have acute on chronic renal failure. His baseline creatinine is about 1.7. It is 2.3 today.    He denies chest pain, palpitations, shortness of breath. He has occasional headache. He reports that he has chronic left ear infection which is healing. He denies dysuria, hematuria, weakness, numbness.    Review of Systems    The 10 point Review of Systems is negative other than noted in the HPI    Past Medical History    I have reviewed this patient's medical history and updated it with pertinent information if needed.   Past Medical History:   Diagnosis Date     HTN (hypertension)        Past Surgical History   I have reviewed this patient's surgical history and updated it with pertinent information if needed.  Past Surgical History:   Procedure Laterality Date     left knee surgery         Social History   I have reviewed this patient's social history and updated it with pertinent information if needed.  Social History     Tobacco Use     Smoking status: Former Smoker     Packs/day: 0.50     Years: 30.00     Pack years: 15.00     Types: Cigarettes     Quit date: 2014     Years since quittin.5     Smokeless tobacco: Never Used   Substance Use Topics     Alcohol use: Yes     Comment: occassional wine     Drug use: Not Currently       Family History   I have reviewed this  patient's family history and updated it with pertinent information if needed.  Family History   Problem Relation Age of Onset     Hypertension Mother      Hypertension Father      Hypertension Brother      Hypertension Brother      Hypertension Brother      Hypertension Brother      Cancer Brother         prostate     Hypertension Brother      Hypertension Brother      Diabetes Brother      Cancer Brother      Hypertension Brother      Cancer Brother         prostate       Prior to Admission Medications   Prior to Admission Medications   Prescriptions Last Dose Informant Patient Reported? Taking?   acetaminophen (TYLENOL) 500 MG tablet  Self Yes No   Sig: Take 500-1,000 mg by mouth every 6 hours as needed for mild pain Will take PRN if headache. Patient takes half of the 500 MG tablet.   albuterol (PROAIR HFA/PROVENTIL HFA/VENTOLIN HFA) 108 (90 Base) MCG/ACT inhaler More than a month at Unknown time Self No No   Sig: Inhale 2 puffs into the lungs every 4 hours as needed for shortness of breath / dyspnea or wheezing   allopurinol (ZYLOPRIM) 100 MG tablet 8/4/2021 at 0800 Self No No   Sig: Take 1 tablet (100 mg) by mouth daily   aspirin 81 MG EC tablet 8/4/2021 at 0800 Self Yes Yes   Sig: Take 81 mg by mouth daily.   atorvastatin (LIPITOR) 20 MG tablet 8/4/2021 at 0800 Self No Yes   Sig: Take 1 tablet (20 mg) by mouth daily Patient takes 1/2 tablet daily   Patient taking differently: Take 10 mg by mouth daily Patient takes 1/2 tablet daily   empagliflozin (JARDIANCE) 25 MG TABS tablet 8/4/2021 at 0800  Yes Yes   Sig: Take 25 mg by mouth daily   fluticasone (FLONASE) 50 MCG/ACT nasal spray  Self Yes No   Sig: Spray 2 sprays into both nostrils daily as needed For nasal congestion   hydrochlorothiazide (HYDRODIURIL) 25 MG tablet 8/4/2021 at 0800 Self No Yes   Sig: Take 1 tablet (25 mg) by mouth every morning   lisinopril (ZESTRIL) 40 MG tablet 8/4/2021 at Unknown time  No Yes   Sig: Take 1 tablet (40 mg) by mouth every  "evening   omeprazole (PRILOSEC) 40 MG DR capsule 8/4/2021 at Unknown time Self No Yes   Sig: Take 1 capsule (40 mg) by mouth every evening      Facility-Administered Medications: None     Allergies   Allergies   Allergen Reactions     Beta Adrenergic Blockers Other (See Comments)     Hands arms and feet cold      Gadavist [Gadobutrol] Hives and Itching     Patient developed hives on forehead and anterior abdomen with itchiness of eyes and face within 30 seconds of Gadavist contrast injection. Symptoms started feeling better within about 15 minutes of injection.         Gadavist - 10 mL - Lot #: [id number] - NDC 34805-598-21     Azelastine Other (See Comments) and Dizziness     \"shaky\" and sick to stomach     Ciprofloxacin Other (See Comments) and Dizziness     and sick to stomach     Diltiazem Other (See Comments)     Blood pressure up and pulse up then down      Ibuprofen Other (See Comments)     Hypertension     Naproxen Other (See Comments)     Feels sick, like coming down with the stomach flu     Citalopram Anxiety and Other (See Comments)     Agitation, Side effects started in less than an hour after first pill       Physical Exam   Vital Signs: Temp: (!) 102.3  F (39.1  C) Temp src: Oral BP: 130/68 Pulse: 105   Resp: (!) 34 SpO2: 91 % O2 Device: None (Room air)    Weight: 239 lbs 10.24 oz    Physical Exam  Constitutional:       General: He is not in acute distress.     Appearance: He is obese. He is not ill-appearing, toxic-appearing or diaphoretic.   HENT:      Head: Normocephalic and atraumatic.      Right Ear: External ear normal.      Left Ear: External ear normal.      Nose: Nose normal. No congestion or rhinorrhea.      Mouth/Throat:      Mouth: Mucous membranes are dry.      Pharynx: Oropharynx is clear. No oropharyngeal exudate or posterior oropharyngeal erythema.   Eyes:      General: No scleral icterus.        Right eye: No discharge.         Left eye: No discharge.      Extraocular Movements: " Extraocular movements intact.      Conjunctiva/sclera: Conjunctivae normal.      Pupils: Pupils are equal, round, and reactive to light.   Cardiovascular:      Rate and Rhythm: Normal rate and regular rhythm.      Pulses: Normal pulses.      Heart sounds: Normal heart sounds. No murmur heard.   No friction rub. No gallop.    Pulmonary:      Effort: Pulmonary effort is normal. No respiratory distress.      Breath sounds: Normal breath sounds. No stridor. No wheezing, rhonchi or rales.   Abdominal:      Palpations: Abdomen is soft. There is no mass.      Tenderness: There is abdominal tenderness (in the suprapubic and LLQ). There is guarding. There is no rebound.      Hernia: No hernia is present.      Comments: Hypoactive bowel sounds   Musculoskeletal:         General: No swelling, tenderness or deformity. Normal range of motion.      Cervical back: Normal range of motion and neck supple. No rigidity or tenderness.   Lymphadenopathy:      Cervical: No cervical adenopathy.   Skin:     General: Skin is warm and dry.      Capillary Refill: Capillary refill takes less than 2 seconds.      Coloration: Skin is not jaundiced.      Findings: No erythema or rash.   Neurological:      General: No focal deficit present.      Mental Status: He is alert and oriented to person, place, and time. Mental status is at baseline.      Sensory: No sensory deficit.      Motor: No weakness.   Psychiatric:         Mood and Affect: Mood normal.         Behavior: Behavior normal.         Thought Content: Thought content normal.         Data   Data reviewed today: I reviewed all medications, new labs and imaging results over the last 24 hours. I personally reviewed the abdominal CT image(s) showing acute moderate sigmoid diverticulitis.    Recent Labs   Lab 08/05/21  0243 08/04/21  1826   WBC  --  19.8*   HGB  --  18.4*   MCV  --  90   PLT  --  218   NA  --  130*   POTASSIUM  --  4.7   CHLORIDE  --  99   CO2  --  22   BUN  --  45*   CR  --   2.35*   ANIONGAP  --  9   JORGE  --  9.8   * 138*   ALBUMIN  --  3.5   PROTTOTAL  --  8.5   BILITOTAL  --  1.0   ALKPHOS  --  149   ALT  --  70   AST  --  62*   LIPASE  --  96       Visit/Communication Style   Virtual (Video) communication was used to evaluate Robert Silverio consented to the use of video communication: yes    Patient's location: [facility]   Provider's location during the visit: [facility]

## 2021-08-05 NOTE — PLAN OF CARE
AOx4, up w/SBA to bathroom void. PRN tylenol x 1 for fever, prn zofran x 1 for mild nausea, no vomiting. Rates lower abd pain 3/10, received 1L bolus, blood sugar 0200 141. Rested intermittently throughout shift.

## 2021-08-05 NOTE — ASSESSMENT & PLAN NOTE
IV rocephin, flagyl, fluids  Lactic acid level is 1.4  CT abdomen showed Moderate acute diverticulitis of the sigmoid colon.  His last colonoscopy was normal 2 years ago.

## 2021-08-05 NOTE — PROGRESS NOTES
[facility] Progress Note  Date of Service: 08/05/2021    Assessment & Plan   Jean Claude Sneed is a 66 year old male who presented on 8/4/2021 with 1-1/2 weeks of left lower quadrant abdominal pain that was sharp and new onset of fevers.    Diverticulitis of colon  IV rocephin, flagyl, fluids  Lactic acid level is 1.4  CT abdomen showed Moderate acute diverticulitis of the sigmoid colon.  His last colonoscopy was normal 2 years ago.   - continue ceftriaxone but increase to 2 g every 24   -Continue metronidazole    Sepsis due to diverticulitis  Fever, marked leukocytosis, tachycardia and organ dysfunction with acute kidney injury due to underlying diverticulitis.   Improving but still with fevers.   -Recheck blood cultures prior to increasing the ceftriaxone   -Continue antibiotics as above     Acute kidney injury superimposed on chronic kidney disease (H)  Baseline creatinine 1.8, presents with creatinine 2.45  Hydrochlorothiazide and Lisinopril are held  Creatinine is improving     Diabetes mellitus type 2  Insulin sliding scale  He is on Empagliflozin at home.     Hyperlipidemia LDL goal <130  Statin when he is able to take po     Tobacco abuse  As needed bronchodilators.  He reports that he quit smoking 8 years ago.     Essential hypertension, benign  hydrochlorothiazide and lisinopril are on hold  PRN Labetalol     Personal history of peptic ulcer disease  Change protonix to iv daily when he is nothing by mouth     External hemorrhoids  Preparation H cream    COVID status  SARS-CoV-2 PCR negative on admission  Vaccine status: not on record      Diet: Clear Liquid Diet    DVT Prophylaxis: Heparin SQ  Tate Catheter: Not present  Central Lines: None  Code Status: Full Code         Discussion: Still having fevers.  Pain is improving which is a good sign.    Disposition: Anticipate discharge 2-3 more days    Attestation:  Total time: 35 minutes    Joshua Bhandari MD        Interval History   Having fevers and some mild headache at this morning.  Abdominal pain is improving.  Would like to drink fluids.    Physical Exam   Temp:  [97.7  F (36.5  C)-102.3  F (39.1  C)] 101.1  F (38.4  C)  Pulse:  [] 76  Resp:  [18-35] 18  BP: (103-155)/(56-80) 118/56  SpO2:  [91 %-98 %] 98 %    Weights:   Vitals:    08/04/21 1652 08/05/21 0100   Weight: 108 kg (238 lb) 108.7 kg (239 lb 10.2 oz)    Body mass index is 34.63 kg/m .    Constitutional: alert and oriented, mild toxic appearing  CV: Regular, no murmur or edema  Respiratory: CTA bilaterally  GI: Soft, mild to moderate left lower and suprapubic tenderness to moderate deep palpation, bowel sounds normal active  Skin: Warm and mildly diaphoretic    Data   Recent Labs   Lab 08/05/21 0728 08/05/21 0536 08/05/21 0243 08/04/21  1826   WBC  --  15.7*  --  19.8*   HGB  --  15.4  --  18.4*   MCV  --  89  --  90   PLT  --  181  --  218   NA  --  133  --  130*   POTASSIUM  --  4.3  --  4.7   CHLORIDE  --  104  --  99   CO2  --  21  --  22   BUN  --  37*  --  45*   CR  --  2.25*  --  2.35*   ANIONGAP  --  8  --  9   JORGE  --  8.4*  --  9.8   * 116* 141* 138*   ALBUMIN  --   --   --  3.5   PROTTOTAL  --   --   --  8.5   BILITOTAL  --   --   --  1.0   ALKPHOS  --   --   --  149   ALT  --   --   --  70   AST  --   --   --  62*   LIPASE  --   --   --  96       Recent Labs   Lab 08/05/21 0728 08/05/21 0536 08/05/21 0243 08/04/21  1826   * 116* 141* 138*        Unresulted Labs Ordered in the Past 30 Days of this Admission     Date and Time Order Name Status Description    8/5/2021  2:06 AM Blood Culture Hand, Right In process     8/5/2021  2:06 AM Blood Culture Arm, Right In process            Imaging  Recent Results (from the past 24 hour(s))   CT Abdomen Pelvis w/o Contrast    Narrative    EXAM: CT ABDOMEN PELVIS W/O CONTRAST  LOCATION: [facility]  DATE/TIME: 8/4/2021 7:16 PM    INDICATION:  Diverticulitis suspected  COMPARISON: None.  TECHNIQUE: CT scan of the abdomen and pelvis was performed without IV contrast. Multiplanar reformats were obtained. Dose reduction techniques were used.  CONTRAST: None.    FINDINGS:   LOWER CHEST: Moderate calcification within the imaged portions of the mid and distal RCA. Lung bases are clear.    HEPATOBILIARY: Normal.    PANCREAS: Mild fatty replacement. Otherwise unremarkable.    SPLEEN: Normal.    ADRENAL GLANDS: Normal.    KIDNEYS/BLADDER: Normal.    BOWEL: Moderate acute sigmoid diverticulitis. Minimal underlying diverticulosis. No free air. No significant free fluid. No bowel obstruction. Normal caliber appendix.    LYMPH NODES: Normal.    VASCULATURE: Mild focal abdominal aortic ectasia measuring 3 cm at the level of the NACHO origin. Diffuse atherosclerotic calcification of the abdominal aorta.    PELVIC ORGANS: Normal.    MUSCULOSKELETAL: Normal.      Impression    IMPRESSION:   1.  Moderate acute diverticulitis of the sigmoid colon.          I reviewed all new labs and imaging results over the last 24 hours. I personally reviewed the abdominal CT image(s) showing Segment of sigmoid colon which is thickened with associated local inflammatory changes but no abscess or perforation seen.  There is very few diverticula.  This was reviewed with the radiologist.  Overall still looks like diverticulitis most likely..    Medications     sodium chloride 100 mL/hr at 08/05/21 0303       allopurinol  200 mg Oral QAM     aspirin  81 mg Oral Daily     atorvastatin  10 mg Oral Daily     cefTRIAXone  2 g Intravenous Q24H     heparin ANTICOAGULANT  5,000 Units Subcutaneous Q12H     insulin aspart  1-7 Units Subcutaneous TID AC     insulin aspart  1-5 Units Subcutaneous At Bedtime     metroNIDAZOLE  500 mg Intravenous Q8H     pantoprazole (PROTONIX) IV  40 mg Intravenous Daily with breakfast     sodium chloride (PF)  3 mL Intracatheter Q8H   Reviewed    Joshua Bhandari MD

## 2021-08-06 ENCOUNTER — APPOINTMENT (OUTPATIENT)
Dept: GENERAL RADIOLOGY | Facility: CLINIC | Age: 66
DRG: 872 | End: 2021-08-06
Attending: INTERNAL MEDICINE
Payer: COMMERCIAL

## 2021-08-06 LAB
ANION GAP SERPL CALCULATED.3IONS-SCNC: 9 MMOL/L (ref 3–14)
BUN SERPL-MCNC: 30 MG/DL (ref 7–30)
CALCIUM SERPL-MCNC: 8.1 MG/DL (ref 8.5–10.1)
CHLORIDE BLD-SCNC: 102 MMOL/L (ref 94–109)
CO2 SERPL-SCNC: 19 MMOL/L (ref 20–32)
CREAT SERPL-MCNC: 2.14 MG/DL (ref 0.66–1.25)
ERYTHROCYTE [DISTWIDTH] IN BLOOD BY AUTOMATED COUNT: 12.7 % (ref 10–15)
GFR SERPL CREATININE-BSD FRML MDRD: 31 ML/MIN/1.73M2
GLUCOSE BLD-MCNC: 129 MG/DL (ref 70–99)
GLUCOSE BLDC GLUCOMTR-MCNC: 106 MG/DL (ref 70–99)
GLUCOSE BLDC GLUCOMTR-MCNC: 119 MG/DL (ref 70–99)
GLUCOSE BLDC GLUCOMTR-MCNC: 122 MG/DL (ref 70–99)
GLUCOSE BLDC GLUCOMTR-MCNC: 132 MG/DL (ref 70–99)
GLUCOSE BLDC GLUCOMTR-MCNC: 139 MG/DL (ref 70–99)
HCT VFR BLD AUTO: 41.9 % (ref 40–53)
HGB BLD-MCNC: 14.6 G/DL (ref 13.3–17.7)
LACTATE SERPL-SCNC: 1.2 MMOL/L (ref 0.7–2)
MCH RBC QN AUTO: 31 PG (ref 26.5–33)
MCHC RBC AUTO-ENTMCNC: 34.8 G/DL (ref 31.5–36.5)
MCV RBC AUTO: 89 FL (ref 78–100)
PLATELET # BLD AUTO: 136 10E3/UL (ref 150–450)
POTASSIUM BLD-SCNC: 4.1 MMOL/L (ref 3.4–5.3)
RBC # BLD AUTO: 4.71 10E6/UL (ref 4.4–5.9)
SODIUM SERPL-SCNC: 130 MMOL/L (ref 133–144)
WBC # BLD AUTO: 13.3 10E3/UL (ref 4–11)

## 2021-08-06 PROCEDURE — 120N000001 HC R&B MED SURG/OB

## 2021-08-06 PROCEDURE — 80048 BASIC METABOLIC PNL TOTAL CA: CPT | Performed by: INTERNAL MEDICINE

## 2021-08-06 PROCEDURE — 36415 COLL VENOUS BLD VENIPUNCTURE: CPT | Performed by: INTERNAL MEDICINE

## 2021-08-06 PROCEDURE — 99232 SBSQ HOSP IP/OBS MODERATE 35: CPT | Performed by: INTERNAL MEDICINE

## 2021-08-06 PROCEDURE — 71046 X-RAY EXAM CHEST 2 VIEWS: CPT

## 2021-08-06 PROCEDURE — C9113 INJ PANTOPRAZOLE SODIUM, VIA: HCPCS | Performed by: INTERNAL MEDICINE

## 2021-08-06 PROCEDURE — 85027 COMPLETE CBC AUTOMATED: CPT | Performed by: INTERNAL MEDICINE

## 2021-08-06 PROCEDURE — 250N000013 HC RX MED GY IP 250 OP 250 PS 637: Performed by: INTERNAL MEDICINE

## 2021-08-06 PROCEDURE — 83605 ASSAY OF LACTIC ACID: CPT | Performed by: INTERNAL MEDICINE

## 2021-08-06 PROCEDURE — 250N000011 HC RX IP 250 OP 636: Performed by: INTERNAL MEDICINE

## 2021-08-06 RX ORDER — ACETAMINOPHEN 325 MG/1
650 TABLET ORAL ONCE
Status: COMPLETED | OUTPATIENT
Start: 2021-08-06 | End: 2021-08-06

## 2021-08-06 RX ORDER — PANTOPRAZOLE SODIUM 40 MG/1
40 TABLET, DELAYED RELEASE ORAL
Status: DISCONTINUED | OUTPATIENT
Start: 2021-08-06 | End: 2021-08-09 | Stop reason: HOSPADM

## 2021-08-06 RX ADMIN — PANTOPRAZOLE SODIUM 40 MG: 40 INJECTION, POWDER, FOR SOLUTION INTRAVENOUS at 08:27

## 2021-08-06 RX ADMIN — METRONIDAZOLE 500 MG: 500 INJECTION, SOLUTION INTRAVENOUS at 12:52

## 2021-08-06 RX ADMIN — HEPARIN SODIUM 5000 UNITS: 5000 INJECTION INTRAVENOUS; SUBCUTANEOUS at 02:13

## 2021-08-06 RX ADMIN — MINERAL OIL, PETROLATUM, PHENYLEPHRINE HCL: 14; 74.9; .25 OINTMENT RECTAL at 13:00

## 2021-08-06 RX ADMIN — ACETAMINOPHEN 650 MG: 325 TABLET, FILM COATED ORAL at 21:10

## 2021-08-06 RX ADMIN — PANTOPRAZOLE SODIUM 40 MG: 40 TABLET, DELAYED RELEASE ORAL at 18:17

## 2021-08-06 RX ADMIN — ASPIRIN 81 MG: 81 TABLET, COATED ORAL at 08:31

## 2021-08-06 RX ADMIN — HEPARIN SODIUM 5000 UNITS: 5000 INJECTION INTRAVENOUS; SUBCUTANEOUS at 14:10

## 2021-08-06 RX ADMIN — ONDANSETRON 4 MG: 2 INJECTION INTRAMUSCULAR; INTRAVENOUS at 22:46

## 2021-08-06 RX ADMIN — ACETAMINOPHEN 650 MG: 325 TABLET, FILM COATED ORAL at 05:45

## 2021-08-06 RX ADMIN — METRONIDAZOLE 500 MG: 500 INJECTION, SOLUTION INTRAVENOUS at 21:11

## 2021-08-06 RX ADMIN — ALLOPURINOL 200 MG: 100 TABLET ORAL at 08:31

## 2021-08-06 RX ADMIN — ACETAMINOPHEN 650 MG: 325 TABLET, FILM COATED ORAL at 11:23

## 2021-08-06 RX ADMIN — ONDANSETRON 4 MG: 2 INJECTION INTRAMUSCULAR; INTRAVENOUS at 16:14

## 2021-08-06 RX ADMIN — CEFTRIAXONE SODIUM 2 G: 2 INJECTION, SOLUTION INTRAVENOUS at 09:55

## 2021-08-06 RX ADMIN — ACETAMINOPHEN 650 MG: 325 TABLET, FILM COATED ORAL at 16:03

## 2021-08-06 RX ADMIN — ATORVASTATIN CALCIUM 10 MG: 10 TABLET, FILM COATED ORAL at 08:31

## 2021-08-06 RX ADMIN — METRONIDAZOLE 500 MG: 500 INJECTION, SOLUTION INTRAVENOUS at 05:44

## 2021-08-06 RX ADMIN — ONDANSETRON 4 MG: 2 INJECTION INTRAMUSCULAR; INTRAVENOUS at 03:05

## 2021-08-06 RX ADMIN — ACETAMINOPHEN 650 MG: 325 TABLET, FILM COATED ORAL at 02:13

## 2021-08-06 ASSESSMENT — ACTIVITIES OF DAILY LIVING (ADL)
ADLS_ACUITY_SCORE: 14

## 2021-08-06 NOTE — PLAN OF CARE
Patient A & O, up with standby assist. Denies nausea at this time. Tolerating clear liquid diet. Reports brief, intermittent LLQ abdominal pain, pain also present when attempting to have a BM. Voiding w/o difficulty. Patient febrile this evening - 101.2 F, most recently 99.5 F - managing w/ scheduled acetaminophen q4h.

## 2021-08-06 NOTE — PROGRESS NOTES
[facility] Medicine Progress Note  Date of Service: 08/06/2021    Assessment & Plan           Jean Claude Sneed is a 66 year old male who presented on 8/4/2021 with 1-1/2 weeks of left lower quadrant abdominal pain that was sharp and new onset of fevers.    Diverticulitis of colon    CT abdomen showed Moderate acute diverticulitis of the sigmoid colon. Reviewed with radiologist on 8/5. Not much diverticulosis but inflammatory change is of a short segment of sigmoid bowel most consistent with diverticulitis. No evidence of perforation. First episode.     His last colonoscopy was normal 2 years ago. Pain much improved. Tolerating clears.   - continue clears today, likely can advance tomorrow   - continue ceftriaxone 2 g every 24   -Continue metronidazole 500 mg IV q 8h    Severe sepsis due to diverticulitis, resolving     Diagnosis based on Temp > 38 C, HR > 90 bpm, WBC > 12 and STEPHON due to sepsis. WBC trending down. Still spiking high fevers associated with rigors and drenching sweats last night. Still in first 48 hours of antibiotics.     Blood cultures NGTD 8/4 and 8/5   -Continue antibiotics as above     Acute kidney injury superimposed on chronic kidney disease (H)    Baseline creatinine 1.8, presents with creatinine 2.35    Hydrochlorothiazide and Lisinopril are held    Creatinine is improving 2.35 -> 2.25 -> 2.14. Good urine output.    - stopped IV fluids 8/6   - continue holding hydrochlorothiazide and lisinopril for now   - continue to follow     Diabetes mellitus type 2    He is on Empagliflozin at home. HgA1c 6.8 on admission.     BG decent this admission.   - continue sliding scale insulin   - holding empagliflozin for now      Hyperlipidemia LDL goal <130   - continue atorvastatin     Tobacco abuse  As needed bronchodilators.  He reports that he quit smoking 8 years ago.     Essential hypertension, benign  hydrochlorothiazide and lisinopril are on hold  PRN  Labetalol     Personal history of peptic ulcer disease    Omeprazole as at home     External hemorrhoids  Preparation H cream    COVID status  SARS-CoV-2 PCR negative on admission  Fully vaccinated, Pfizer, April 2021, [facility] per patient       Diet: Clear Liquid Diet Other - please comment    DVT Prophylaxis: Heparin SQ  Tate Catheter: Not present  Central Lines: None  Code Status: Full Code         Discussion: Improving though still with fevers.     Disposition: Anticipate discharge 2-3 days     Attestation:  Total time: 25 minutes    Joshua Bhandari MD       Interval History   C/o fever associated with shaking chills, dry heaves and coughing, drenching sweats overnight. Had tachypnea associated with the event. Now improved this morning. Abdominal pain nearly gone. Stools frequent and partially formed, some blood on tissue from hemorrhoids but no hematochezia. Tolerating clears again this morning. No chest pain. No current shortness of breath. Good urine output every couple of hours.     Physical Exam   Temp:  [98.3  F (36.8  C)-104.1  F (40.1  C)] 98.3  F (36.8  C)  Pulse:  [] 84  Resp:  [18-48] 18  BP: (102-145)/(58-96) 106/64  SpO2:  [87 %-95 %] 93 %    Weights:   Vitals:    08/04/21 1652 08/05/21 0100   Weight: 108 kg (238 lb) 108.7 kg (239 lb 10.2 oz)    Body mass index is 34.63 kg/m .    Constitutional: alert and oriented, nontoxic presently, NAD  CV: Regular, no murmur, no edema  Respiratory: CTA bilaterally  GI: Soft, very mild tenderness to deep palpation left lower quadrant, bowel sounds normal  Skin: Warm and mild diaphoretic    Data   Recent Labs   Lab 08/06/21  0754 08/06/21  0457 08/06/21  0204 08/05/21  0536 08/04/21  1826   WBC  --  13.3*  --  15.7* 19.8*   HGB  --  14.6  --  15.4 18.4*   MCV  --  89  --  89 90   PLT  --  136*  --  181 218   NA  --  130*  --  133 130*   POTASSIUM  --  4.1  --  4.3 4.7   CHLORIDE  --  102  --  104 99   CO2  --  19*  --  21 22   BUN  --  30  --  37* 45*   CR  --   2.14*  --  2.25* 2.35*   ANIONGAP  --  9  --  8 9   JORGE  --  8.1*  --  8.4* 9.8   * 129* 106* 116* 138*   ALBUMIN  --   --   --   --  3.5   PROTTOTAL  --   --   --   --  8.5   BILITOTAL  --   --   --   --  1.0   ALKPHOS  --   --   --   --  149   ALT  --   --   --   --  70   AST  --   --   --   --  62*   LIPASE  --   --   --   --  96       Recent Labs   Lab 08/06/21  0754 08/06/21  0457 08/06/21  0204 08/05/21 2005 08/05/21  1625 08/05/21  1128   * 129* 106* 153* 112* 196*        Unresulted Labs Ordered in the Past 30 Days of this Admission     Date and Time Order Name Status Description    8/5/2021  8:17 AM Blood Culture Hand, Left Preliminary     8/5/2021  8:17 AM Blood Culture Arm, Right Preliminary     8/5/2021  2:06 AM Blood Culture Hand, Right Preliminary     8/5/2021  2:06 AM Blood Culture Arm, Right Preliminary            Imaging  No results found for this or any previous visit (from the past 24 hour(s)).     I reviewed all new labs and imaging results over the last 24 hours. I personally reviewed no images or EKG's today.    Medications     sodium chloride Stopped (08/06/21 0345)       allopurinol  200 mg Oral QAM     aspirin  81 mg Oral Daily     atorvastatin  10 mg Oral Daily     cefTRIAXone  2 g Intravenous Q24H     heparin ANTICOAGULANT  5,000 Units Subcutaneous Q12H     insulin aspart  1-7 Units Subcutaneous TID AC     insulin aspart  1-5 Units Subcutaneous At Bedtime     metroNIDAZOLE  500 mg Intravenous Q8H     pantoprazole (PROTONIX) IV  40 mg Intravenous Daily with breakfast     sodium chloride (PF)  3 mL Intracatheter Q8H   Reviewed meds    Joshua Bhandari MD

## 2021-08-06 NOTE — PLAN OF CARE
Patient complains of feeling chilled and nauseated. Afebrile. Tylenol and Zofran given. Patient states he feels like this before spiking a fever.

## 2021-08-06 NOTE — PLAN OF CARE
Patient continues on clear liquid diet. Afebrile this shift. Received Tylenol once. Weaned to room air with saturation 91-95%. Chest xray done. 3 bowel movements. Patient states he was a feeling of fullness in his abdomin. Patient independent in room. Encouraged to ambulate in the hallway which he agrees to do later.

## 2021-08-06 NOTE — PLAN OF CARE
Patient A & O, dry-heaving, rigors, audible wheezing, SpO2  87% on RA, placed on 2 LPM SpO2 improved to 92%, RR 48, temp 104.1 F after tylenol. Notified Dr. Hapmton - orders to stop IV fluids and 1 x dose tylenol 650 mg obtained. Cooling measures performed. Temp improving - 102.1 F.

## 2021-08-07 LAB
ANION GAP SERPL CALCULATED.3IONS-SCNC: 11 MMOL/L (ref 3–14)
BUN SERPL-MCNC: 28 MG/DL (ref 7–30)
CALCIUM SERPL-MCNC: 8.4 MG/DL (ref 8.5–10.1)
CHLORIDE BLD-SCNC: 105 MMOL/L (ref 94–109)
CO2 SERPL-SCNC: 19 MMOL/L (ref 20–32)
CREAT SERPL-MCNC: 2.07 MG/DL (ref 0.66–1.25)
ERYTHROCYTE [DISTWIDTH] IN BLOOD BY AUTOMATED COUNT: 12.8 % (ref 10–15)
GFR SERPL CREATININE-BSD FRML MDRD: 32 ML/MIN/1.73M2
GLUCOSE BLD-MCNC: 119 MG/DL (ref 70–99)
GLUCOSE BLDC GLUCOMTR-MCNC: 111 MG/DL (ref 70–99)
GLUCOSE BLDC GLUCOMTR-MCNC: 112 MG/DL (ref 70–99)
GLUCOSE BLDC GLUCOMTR-MCNC: 118 MG/DL (ref 70–99)
GLUCOSE BLDC GLUCOMTR-MCNC: 118 MG/DL (ref 70–99)
GLUCOSE BLDC GLUCOMTR-MCNC: 133 MG/DL (ref 70–99)
HCT VFR BLD AUTO: 42.3 % (ref 40–53)
HGB BLD-MCNC: 14.4 G/DL (ref 13.3–17.7)
LACTATE SERPL-SCNC: 1 MMOL/L (ref 0.7–2)
MCH RBC QN AUTO: 30 PG (ref 26.5–33)
MCHC RBC AUTO-ENTMCNC: 34 G/DL (ref 31.5–36.5)
MCV RBC AUTO: 88 FL (ref 78–100)
PLATELET # BLD AUTO: 149 10E3/UL (ref 150–450)
POTASSIUM BLD-SCNC: 3.9 MMOL/L (ref 3.4–5.3)
RBC # BLD AUTO: 4.8 10E6/UL (ref 4.4–5.9)
SODIUM SERPL-SCNC: 135 MMOL/L (ref 133–144)
WBC # BLD AUTO: 12.6 10E3/UL (ref 4–11)

## 2021-08-07 PROCEDURE — 250N000011 HC RX IP 250 OP 636: Performed by: INTERNAL MEDICINE

## 2021-08-07 PROCEDURE — 99207 PR CDG-CUT & PASTE-POTENTIAL IMPACT ON LEVEL: CPT | Performed by: INTERNAL MEDICINE

## 2021-08-07 PROCEDURE — 36415 COLL VENOUS BLD VENIPUNCTURE: CPT | Performed by: INTERNAL MEDICINE

## 2021-08-07 PROCEDURE — 250N000013 HC RX MED GY IP 250 OP 250 PS 637: Performed by: INTERNAL MEDICINE

## 2021-08-07 PROCEDURE — 99233 SBSQ HOSP IP/OBS HIGH 50: CPT | Performed by: INTERNAL MEDICINE

## 2021-08-07 PROCEDURE — 80048 BASIC METABOLIC PNL TOTAL CA: CPT | Performed by: INTERNAL MEDICINE

## 2021-08-07 PROCEDURE — 120N000001 HC R&B MED SURG/OB

## 2021-08-07 PROCEDURE — 85027 COMPLETE CBC AUTOMATED: CPT | Performed by: INTERNAL MEDICINE

## 2021-08-07 PROCEDURE — 83605 ASSAY OF LACTIC ACID: CPT | Performed by: INTERNAL MEDICINE

## 2021-08-07 RX ADMIN — ACETAMINOPHEN 650 MG: 325 TABLET, FILM COATED ORAL at 02:31

## 2021-08-07 RX ADMIN — HEPARIN SODIUM 5000 UNITS: 5000 INJECTION INTRAVENOUS; SUBCUTANEOUS at 14:37

## 2021-08-07 RX ADMIN — ATORVASTATIN CALCIUM 10 MG: 10 TABLET, FILM COATED ORAL at 08:50

## 2021-08-07 RX ADMIN — CEFTRIAXONE SODIUM 2 G: 2 INJECTION, SOLUTION INTRAVENOUS at 08:52

## 2021-08-07 RX ADMIN — METRONIDAZOLE 500 MG: 500 INJECTION, SOLUTION INTRAVENOUS at 05:09

## 2021-08-07 RX ADMIN — ASPIRIN 81 MG: 81 TABLET, COATED ORAL at 08:50

## 2021-08-07 RX ADMIN — PANTOPRAZOLE SODIUM 40 MG: 40 TABLET, DELAYED RELEASE ORAL at 15:34

## 2021-08-07 RX ADMIN — ACETAMINOPHEN 650 MG: 325 TABLET, FILM COATED ORAL at 15:34

## 2021-08-07 RX ADMIN — METRONIDAZOLE 500 MG: 500 INJECTION, SOLUTION INTRAVENOUS at 13:42

## 2021-08-07 RX ADMIN — HEPARIN SODIUM 5000 UNITS: 5000 INJECTION INTRAVENOUS; SUBCUTANEOUS at 02:23

## 2021-08-07 RX ADMIN — PANTOPRAZOLE SODIUM 40 MG: 40 TABLET, DELAYED RELEASE ORAL at 06:13

## 2021-08-07 RX ADMIN — ALLOPURINOL 200 MG: 100 TABLET ORAL at 08:50

## 2021-08-07 RX ADMIN — METRONIDAZOLE 500 MG: 500 INJECTION, SOLUTION INTRAVENOUS at 21:30

## 2021-08-07 ASSESSMENT — ACTIVITIES OF DAILY LIVING (ADL)
ADLS_ACUITY_SCORE: 14

## 2021-08-07 NOTE — PROGRESS NOTES
[facility] Internal Medicine Progress Note     Date of Service (when I saw the patient): 08/07/2021    REASON FOR ADMISSION / INTERVAL HISTORY:  Jean Claude Sneed is a 66 year old male who presented on 8/4/2021 with 1-1/2 weeks of left lower quadrant abdominal pain that was sharp and new onset of fevers. Feels lot better. Pain better and tolerating full diet.    CT abd-IMPRESSION:   1.  Moderate acute diverticulitis of the sigmoid colon.    ASSESSMENT/PLAN:     Diverticulitis of colon  Presented with 1-2 weeks of abd pain, leucocytosis 19.9, temp >101. CT abd as above.  First episode of diverticulitis. His last colonoscopy was normal 2 years ago.   Was started on ceftriaxone/ metronidazole on admit. Pain improving. Tolerating full liq. Temp curve better.   - ADAT, continue antbx.     Severe sepsis  Due to above. Continued to spike temps max to 104 on 8/6. Now t max 101 in 24 hrs. Blood cx -NTD  Improving.    -Continue antibiotics as above     Acute kidney injury superimposed on chronic kidney disease (H)   Baseline creatinine 1.8, presents with creatinine 2.35. Likely due to severe sepsis.Hydrochlorothiazide and Lisinopril are held. Creatinine is improving .Good urine output.   Stopped IV fluids 8/6. Cr 2.07 today.    - continue holding hydrochlorothiazide and lisinopril for now   - continue to follow     Diabetes mellitus type 2    He is on Empagliflozin at home. HgA1c 6.8 on admission.     BG decent this admission.   - continue sliding scale insulin   - holding empagliflozin for now      Hyperlipidemia LDL goal <130   - continue atorvastatin     Tobacco abuse  As needed bronchodilators.  He reports that he quit smoking 8 years ago.     Essential hypertension, benign  hydrochlorothiazide and lisinopril are on hold  PRN Labetalol     Personal history of peptic ulcer disease    Omeprazole as at home     External hemorrhoids  Preparation H cream     COVID status  SARS-CoV-2 PCR negative on admission  Fully  vaccinated, Pfizer, April 2021, [facility] per patient      DISPO  Home in AM if stable.    ARSALAN BOJORQUEZ MD   Pg [phone]    DVT Prhylaxis: Heparin subcutaneous    Code Status: Full Code    ROS:  As described in A/P and Exam.  Otherwise ALL are  negative.    PHYSICAL EXAM:  All vitals have been reviewed    Blood pressure 114/64, pulse 69, temperature 98.7  F (37.1  C), resp. rate 18, weight 108.7 kg (239 lb 10.2 oz), SpO2 93 %.    I/O this shift:  In: 603 [P.O.:600; I.V.:3]  Out: -     GENERAL APPEARANCE: healthy, alert and no distress  EYES: conjunctiva clear, eyes grossly normal  HENT: external ears and nose normal   RESP: lungs clear to auscultation - no rales, rhonchi or wheezes  CV: regular rate and rhythm, normal S1 S2, no S3 or S4 and no murmur, click or rub   ABDOMEN: soft, mild tenderness lower abdomen, no HSM or masses and bowel sounds normal  MS: no clubbing, cyanosis; no edema  SKIN: clear without significant rashes or lesions  NEURO: -non-focal moves all 4 extr    ROUTINE  LABS (Last four results)  CMP  Recent Labs   Lab 08/07/21  1134 08/07/21  0745 08/07/21  0438 08/07/21  0202 08/06/21  0457 08/05/21  0536 08/04/21  1826   NA  --   --  135  --  130* 133 130*   POTASSIUM  --   --  3.9  --  4.1 4.3 4.7   CHLORIDE  --   --  105  --  102 104 99   CO2  --   --  19*  --  19* 21 22   ANIONGAP  --   --  11  --  9 8 9   * 118* 119* 112* 129* 116* 138*   BUN  --   --  28  --  30 37* 45*   CR  --   --  2.07*  --  2.14* 2.25* 2.35*   GFRESTIMATED  --   --  32*  --  31* 29* 27*   JORGE  --   --  8.4*  --  8.1* 8.4* 9.8   PROTTOTAL  --   --   --   --   --   --  8.5   ALBUMIN  --   --   --   --   --   --  3.5   BILITOTAL  --   --   --   --   --   --  1.0   ALKPHOS  --   --   --   --   --   --  149   AST  --   --   --   --   --   --  62*   ALT  --   --   --   --   --   --  70     CBC  Recent Labs   Lab 08/07/21  0438 08/06/21  0457 08/05/21  0536 08/04/21  1826   WBC 12.6* 13.3* 15.7* 19.8*   RBC 4.80 4.71 5.04  5.96*   HGB 14.4 14.6 15.4 18.4*   HCT 42.3 41.9 45.0 53.4*   MCV 88 89 89 90   MCH 30.0 31.0 30.6 30.9   MCHC 34.0 34.8 34.2 34.5   RDW 12.8 12.7 12.5 12.4   * 136* 181 218     INRNo lab results found in last 7 days.  Arterial Blood GasNo lab results found in last 7 days.    No results found for this or any previous visit (from the past 24 hour(s)).

## 2021-08-07 NOTE — PLAN OF CARE
Patient is A &O x4. Up Ind. Patient's temperature at 2016 was 99.7. Patient requested PRN Tylenol and was given it at 2110. Temp at 2302 was 99.1. Patient had nausea at 2230 writer gave patient PRN Zofran IV per request at 2246. Patient slept well after that. At 0229 patient's temp was 101.5. Writer gave PRN Tylenol at 0231. At 0330 temp was 98.5. Abdomin continues to be distended. Hypoactive bowel sounds in all 4 quadrants. Able to pass flatus. Denies any pain or discomfort. Fluids encouraged along with ambulation. Patient's IV was leaking around 0500. New IV placed on right dorsal aspect of wrist. Patient's lactic triggered. Vitals WNL. Writer called lab to draw Lactic. Lactic is currently being drawn with results pending. Writer will update day shift RN.    Shae Bruce RN on 8/7/2021 at 3:39 AM

## 2021-08-07 NOTE — PLAN OF CARE
"Patient advanced to full liquid diet for lunch.  Some discomfort/fullness reported following meal, but patient reports \"I think I ate too much too quick.\"  Denies nausea.  Ambulating in the snow independently.  IV antibiotics infused per MAR.  "

## 2021-08-08 LAB
ANION GAP SERPL CALCULATED.3IONS-SCNC: 7 MMOL/L (ref 3–14)
BASOPHILS # BLD AUTO: 0 10E3/UL (ref 0–0.2)
BASOPHILS NFR BLD AUTO: 0 %
BUN SERPL-MCNC: 26 MG/DL (ref 7–30)
CALCIUM SERPL-MCNC: 8.3 MG/DL (ref 8.5–10.1)
CHLORIDE BLD-SCNC: 106 MMOL/L (ref 94–109)
CO2 SERPL-SCNC: 22 MMOL/L (ref 20–32)
CREAT SERPL-MCNC: 1.89 MG/DL (ref 0.66–1.25)
EOSINOPHIL # BLD AUTO: 0.1 10E3/UL (ref 0–0.7)
EOSINOPHIL NFR BLD AUTO: 1 %
ERYTHROCYTE [DISTWIDTH] IN BLOOD BY AUTOMATED COUNT: 12.8 % (ref 10–15)
GFR SERPL CREATININE-BSD FRML MDRD: 36 ML/MIN/1.73M2
GLUCOSE BLD-MCNC: 117 MG/DL (ref 70–99)
GLUCOSE BLDC GLUCOMTR-MCNC: 101 MG/DL (ref 70–99)
GLUCOSE BLDC GLUCOMTR-MCNC: 113 MG/DL (ref 70–99)
GLUCOSE BLDC GLUCOMTR-MCNC: 114 MG/DL (ref 70–99)
GLUCOSE BLDC GLUCOMTR-MCNC: 144 MG/DL (ref 70–99)
GLUCOSE BLDC GLUCOMTR-MCNC: 146 MG/DL (ref 70–99)
HCT VFR BLD AUTO: 42.3 % (ref 40–53)
HGB BLD-MCNC: 14.6 G/DL (ref 13.3–17.7)
IMM GRANULOCYTES # BLD: 0.1 10E3/UL
IMM GRANULOCYTES NFR BLD: 1 %
LYMPHOCYTES # BLD AUTO: 2 10E3/UL (ref 0.8–5.3)
LYMPHOCYTES NFR BLD AUTO: 18 %
MCH RBC QN AUTO: 30.4 PG (ref 26.5–33)
MCHC RBC AUTO-ENTMCNC: 34.5 G/DL (ref 31.5–36.5)
MCV RBC AUTO: 88 FL (ref 78–100)
MONOCYTES # BLD AUTO: 1.3 10E3/UL (ref 0–1.3)
MONOCYTES NFR BLD AUTO: 12 %
NEUTROPHILS # BLD AUTO: 7.5 10E3/UL (ref 1.6–8.3)
NEUTROPHILS NFR BLD AUTO: 68 %
NRBC # BLD AUTO: 0 10E3/UL
NRBC BLD AUTO-RTO: 0 /100
PLATELET # BLD AUTO: 153 10E3/UL (ref 150–450)
POTASSIUM BLD-SCNC: 3.9 MMOL/L (ref 3.4–5.3)
RBC # BLD AUTO: 4.81 10E6/UL (ref 4.4–5.9)
SODIUM SERPL-SCNC: 135 MMOL/L (ref 133–144)
WBC # BLD AUTO: 10.9 10E3/UL (ref 4–11)

## 2021-08-08 PROCEDURE — 85004 AUTOMATED DIFF WBC COUNT: CPT | Performed by: INTERNAL MEDICINE

## 2021-08-08 PROCEDURE — 80048 BASIC METABOLIC PNL TOTAL CA: CPT | Performed by: INTERNAL MEDICINE

## 2021-08-08 PROCEDURE — 250N000011 HC RX IP 250 OP 636: Performed by: INTERNAL MEDICINE

## 2021-08-08 PROCEDURE — 250N000013 HC RX MED GY IP 250 OP 250 PS 637: Performed by: INTERNAL MEDICINE

## 2021-08-08 PROCEDURE — 99233 SBSQ HOSP IP/OBS HIGH 50: CPT | Performed by: INTERNAL MEDICINE

## 2021-08-08 PROCEDURE — 36415 COLL VENOUS BLD VENIPUNCTURE: CPT | Performed by: INTERNAL MEDICINE

## 2021-08-08 PROCEDURE — 120N000001 HC R&B MED SURG/OB

## 2021-08-08 RX ADMIN — CEFTRIAXONE SODIUM 2 G: 2 INJECTION, SOLUTION INTRAVENOUS at 09:43

## 2021-08-08 RX ADMIN — PANTOPRAZOLE SODIUM 40 MG: 40 TABLET, DELAYED RELEASE ORAL at 06:21

## 2021-08-08 RX ADMIN — ACETAMINOPHEN 650 MG: 325 TABLET, FILM COATED ORAL at 01:20

## 2021-08-08 RX ADMIN — INSULIN ASPART 1 UNITS: 100 INJECTION, SOLUTION INTRAVENOUS; SUBCUTANEOUS at 13:00

## 2021-08-08 RX ADMIN — METRONIDAZOLE 500 MG: 500 INJECTION, SOLUTION INTRAVENOUS at 05:26

## 2021-08-08 RX ADMIN — HEPARIN SODIUM 5000 UNITS: 5000 INJECTION INTRAVENOUS; SUBCUTANEOUS at 13:55

## 2021-08-08 RX ADMIN — ATORVASTATIN CALCIUM 10 MG: 10 TABLET, FILM COATED ORAL at 07:46

## 2021-08-08 RX ADMIN — METRONIDAZOLE 500 MG: 500 INJECTION, SOLUTION INTRAVENOUS at 13:01

## 2021-08-08 RX ADMIN — PANTOPRAZOLE SODIUM 40 MG: 40 TABLET, DELAYED RELEASE ORAL at 16:32

## 2021-08-08 RX ADMIN — HEPARIN SODIUM 5000 UNITS: 5000 INJECTION INTRAVENOUS; SUBCUTANEOUS at 02:13

## 2021-08-08 RX ADMIN — METRONIDAZOLE 500 MG: 500 INJECTION, SOLUTION INTRAVENOUS at 21:40

## 2021-08-08 RX ADMIN — ALLOPURINOL 200 MG: 100 TABLET ORAL at 07:46

## 2021-08-08 RX ADMIN — ACETAMINOPHEN 650 MG: 325 TABLET, FILM COATED ORAL at 20:33

## 2021-08-08 RX ADMIN — ASPIRIN 81 MG: 81 TABLET, COATED ORAL at 07:46

## 2021-08-08 ASSESSMENT — ACTIVITIES OF DAILY LIVING (ADL)
ADLS_ACUITY_SCORE: 14

## 2021-08-08 NOTE — PLAN OF CARE
Patient denies pain, nausea.  Tolerating regular diet.  BS+.  Temp max 99.8.  IV antibiotics infused per MAR.

## 2021-08-08 NOTE — PLAN OF CARE
Patient is A&O x4. Up ind in room. Patient continues to have a temperature throughout the shift. At 1846 patient was 99.6. Patient wanted to wait to receive tylenol. At 0115 patient's temp was 101.6. Writer gave PRN Tylenol at 0120. Patient's temp at 0216 was 100.3. Writer rechecked temp at 0529 and temp was 99.3.     Patient denies any pain. Abdomin continues to be distended with hypoactive bowel sounds. No pain with palpation. Patient stated having one BM loose on 8/7/2021. No nausea this shift. Patient is saline locked, and tolerated IV Flagyl x2 overnight. Tolerating full LQ diet.     /61 (BP Location: Right arm)   Pulse 81   Temp 99.3  F (37.4  C) (Oral)   Resp 18   Wt 108.7 kg (239 lb 10.2 oz)   SpO2 93%   BMI 34.63 kg/m      Shae Bruce RN on 8/8/2021 at 5:37 AM

## 2021-08-08 NOTE — PROGRESS NOTES
[facility] Internal Medicine Progress Note     Date of Service (when I saw the patient): 08/08/2021    REASON FOR ADMISSION / INTERVAL HISTORY:  Jean Claude Sneed is a 66 year old male who presented on 8/4/2021 with 1-1/2 weeks of left lower quadrant abdominal pain that was sharp and new onset of fevers. Feels lot better. Pain better and tolerating full diet.    CT abd-IMPRESSION:   1.  Moderate acute diverticulitis of the sigmoid colon.    ASSESSMENT/PLAN:     Diverticulitis of colon  Presented with 1-2 weeks of abd pain, leucocytosis 19.9, temp >101. CT abd as above.  First episode of diverticulitis. His last colonoscopy was normal 2 years ago.   Was started on ceftriaxone/ metronidazole on admit. Pain improving. Tolerating regular diet. Temp curve better. T .7 at 1AM today   -  continue antbx. If afebrile 24 hrs, can discharge on po antbx in am     Severe sepsis  Due to above. Continued to spike temps max to 104 on 8/6. Now t max 101 in 24 hrs. Blood cx -NTD  Improving.    -Continue antibiotics as above     Acute kidney injury superimposed on chronic kidney disease (H)   Baseline creatinine 1.8, presents with creatinine 2.35. Likely due to severe sepsis.Hydrochlorothiazide and Lisinopril are held. Creatinine is improving .Good urine output.   Stopped IV fluids 8/6. Cr 1.89 today.    - continue holding hydrochlorothiazide and lisinopril for now   - continue to follow     Diabetes mellitus type 2    He is on Empagliflozin at home. HgA1c 6.8 on admission.     BG <150   - continue sliding scale insulin   - holding empagliflozin for now      Hyperlipidemia LDL goal <130   - continue atorvastatin     Tobacco abuse  As needed bronchodilators.  He reports that he quit smoking 8 years ago.     Essential hypertension, benign  hydrochlorothiazide and lisinopril are on hold  PRN Labetalol     Personal history of peptic ulcer disease    Omeprazole as at home     External hemorrhoids  Preparation H cream     COVID  status  SARS-CoV-2 PCR negative on admission  Fully vaccinated, Pfizer, April 2021, [facility] per patient      DISPO  Home in AM if stable.    ARSALAN BOJORQUEZ MD   Pg [phone]    DVT Prhylaxis: Heparin subcutaneous    Code Status: Full Code    ROS:  As described in A/P and Exam.  Otherwise ALL are  negative.    PHYSICAL EXAM:  All vitals have been reviewed    Blood pressure 122/68, pulse 82, temperature 99.5  F (37.5  C), temperature source Oral, resp. rate 16, weight 108.7 kg (239 lb 10.2 oz), SpO2 94 %.    No intake/output data recorded.    GENERAL APPEARANCE: healthy, alert and no distress  EYES: conjunctiva clear, eyes grossly normal  HENT: external ears and nose normal   RESP: lungs clear to auscultation - no rales, rhonchi or wheezes  CV: regular rate and rhythm, normal S1 S2, no S3 or S4 and no murmur, click or rub   ABDOMEN: soft, mild tenderness lower abdomen, no HSM or masses and bowel sounds normal  MS: no clubbing, cyanosis; no edema  SKIN: clear without significant rashes or lesions  NEURO: -non-focal moves all 4 extr    ROUTINE  LABS (Last four results)  CMP  Recent Labs   Lab 08/08/21  1125 08/08/21  0733 08/08/21  0548 08/08/21  0124 08/07/21  0438 08/06/21  0457 08/05/21  0536 08/04/21  1826   NA  --   --  135  --  135 130* 133 130*   POTASSIUM  --   --  3.9  --  3.9 4.1 4.3 4.7   CHLORIDE  --   --  106  --  105 102 104 99   CO2  --   --  22  --  19* 19* 21 22   ANIONGAP  --   --  7  --  11 9 8 9   * 113* 117* 144* 119* 129* 116* 138*   BUN  --   --  26  --  28 30 37* 45*   CR  --   --  1.89*  --  2.07* 2.14* 2.25* 2.35*   GFRESTIMATED  --   --  36*  --  32* 31* 29* 27*   JORGE  --   --  8.3*  --  8.4* 8.1* 8.4* 9.8   PROTTOTAL  --   --   --   --   --   --   --  8.5   ALBUMIN  --   --   --   --   --   --   --  3.5   BILITOTAL  --   --   --   --   --   --   --  1.0   ALKPHOS  --   --   --   --   --   --   --  149   AST  --   --   --   --   --   --   --  62*   ALT  --   --   --   --   --   --   --  70      CBC  Recent Labs   Lab 08/08/21  0548 08/07/21  0438 08/06/21  0457 08/05/21  0536   WBC 10.9 12.6* 13.3* 15.7*   RBC 4.81 4.80 4.71 5.04   HGB 14.6 14.4 14.6 15.4   HCT 42.3 42.3 41.9 45.0   MCV 88 88 89 89   MCH 30.4 30.0 31.0 30.6   MCHC 34.5 34.0 34.8 34.2   RDW 12.8 12.8 12.7 12.5    149* 136* 181     INRNo lab results found in last 7 days.  Arterial Blood GasNo lab results found in last 7 days.    No results found for this or any previous visit (from the past 24 hour(s)).

## 2021-08-09 ENCOUNTER — APPOINTMENT (OUTPATIENT)
Dept: CT IMAGING | Facility: CLINIC | Age: 66
DRG: 872 | End: 2021-08-09
Attending: INTERNAL MEDICINE
Payer: COMMERCIAL

## 2021-08-09 VITALS
TEMPERATURE: 98.7 F | RESPIRATION RATE: 16 BRPM | OXYGEN SATURATION: 96 % | DIASTOLIC BLOOD PRESSURE: 75 MMHG | HEART RATE: 69 BPM | WEIGHT: 239.64 LBS | SYSTOLIC BLOOD PRESSURE: 138 MMHG | BODY MASS INDEX: 34.63 KG/M2

## 2021-08-09 LAB
ANION GAP SERPL CALCULATED.3IONS-SCNC: 5 MMOL/L (ref 3–14)
BUN SERPL-MCNC: 27 MG/DL (ref 7–30)
CALCIUM SERPL-MCNC: 8.5 MG/DL (ref 8.5–10.1)
CHLORIDE BLD-SCNC: 106 MMOL/L (ref 94–109)
CO2 SERPL-SCNC: 26 MMOL/L (ref 20–32)
CREAT SERPL-MCNC: 1.71 MG/DL (ref 0.66–1.25)
GFR SERPL CREATININE-BSD FRML MDRD: 40 ML/MIN/1.73M2
GLUCOSE BLD-MCNC: 112 MG/DL (ref 70–99)
GLUCOSE BLDC GLUCOMTR-MCNC: 114 MG/DL (ref 70–99)
GLUCOSE BLDC GLUCOMTR-MCNC: 117 MG/DL (ref 70–99)
GLUCOSE BLDC GLUCOMTR-MCNC: 118 MG/DL (ref 70–99)
GLUCOSE BLDC GLUCOMTR-MCNC: 125 MG/DL (ref 70–99)
HOLD SPECIMEN: NORMAL
POTASSIUM BLD-SCNC: 4.2 MMOL/L (ref 3.4–5.3)
SODIUM SERPL-SCNC: 137 MMOL/L (ref 133–144)

## 2021-08-09 PROCEDURE — 80048 BASIC METABOLIC PNL TOTAL CA: CPT | Performed by: INTERNAL MEDICINE

## 2021-08-09 PROCEDURE — 250N000011 HC RX IP 250 OP 636: Performed by: INTERNAL MEDICINE

## 2021-08-09 PROCEDURE — 74177 CT ABD & PELVIS W/CONTRAST: CPT

## 2021-08-09 PROCEDURE — 36415 COLL VENOUS BLD VENIPUNCTURE: CPT | Performed by: INTERNAL MEDICINE

## 2021-08-09 PROCEDURE — 250N000013 HC RX MED GY IP 250 OP 250 PS 637: Performed by: INTERNAL MEDICINE

## 2021-08-09 PROCEDURE — 99239 HOSP IP/OBS DSCHRG MGMT >30: CPT | Performed by: INTERNAL MEDICINE

## 2021-08-09 PROCEDURE — 250N000009 HC RX 250: Performed by: INTERNAL MEDICINE

## 2021-08-09 RX ORDER — IOPAMIDOL 755 MG/ML
100 INJECTION, SOLUTION INTRAVASCULAR ONCE
Status: COMPLETED | OUTPATIENT
Start: 2021-08-09 | End: 2021-08-09

## 2021-08-09 RX ADMIN — METRONIDAZOLE 500 MG: 500 INJECTION, SOLUTION INTRAVENOUS at 13:51

## 2021-08-09 RX ADMIN — ASPIRIN 81 MG: 81 TABLET, COATED ORAL at 08:08

## 2021-08-09 RX ADMIN — SODIUM CHLORIDE 69 ML: 9 INJECTION, SOLUTION INTRAVENOUS at 12:04

## 2021-08-09 RX ADMIN — HEPARIN SODIUM 5000 UNITS: 5000 INJECTION INTRAVENOUS; SUBCUTANEOUS at 13:51

## 2021-08-09 RX ADMIN — PANTOPRAZOLE SODIUM 40 MG: 40 TABLET, DELAYED RELEASE ORAL at 06:36

## 2021-08-09 RX ADMIN — ATORVASTATIN CALCIUM 10 MG: 10 TABLET, FILM COATED ORAL at 08:08

## 2021-08-09 RX ADMIN — ALLOPURINOL 200 MG: 100 TABLET ORAL at 08:08

## 2021-08-09 RX ADMIN — HEPARIN SODIUM 5000 UNITS: 5000 INJECTION INTRAVENOUS; SUBCUTANEOUS at 02:14

## 2021-08-09 RX ADMIN — METRONIDAZOLE 500 MG: 500 INJECTION, SOLUTION INTRAVENOUS at 05:33

## 2021-08-09 RX ADMIN — IOPAMIDOL 100 ML: 755 INJECTION, SOLUTION INTRAVENOUS at 12:04

## 2021-08-09 RX ADMIN — CEFTRIAXONE SODIUM 2 G: 2 INJECTION, SOLUTION INTRAVENOUS at 08:08

## 2021-08-09 ASSESSMENT — ACTIVITIES OF DAILY LIVING (ADL)
ADLS_ACUITY_SCORE: 14

## 2021-08-09 NOTE — PROGRESS NOTES
[facility] DISCHARGE NOTE    Patient discharged to home at 4:11 PM via wheel chair. Accompanied by other:self and staff. Discharge instructions reviewed with patient, opportunity offered to ask questions. Prescriptions sent to patients preferred pharmacy. All belongings sent with patient.    Therese Akhtar RN

## 2021-08-09 NOTE — PLAN OF CARE
Patient is A&O x4. Up ind in room. Patient continues to have a temperature throughout the shift. At 1922 patient's temp was 100.1 Writer gave PRN tylenol at 2033. Patient's temp at 2225 was 98.7.     Patient denies any pain. Abdomin continues to be distended but softer than last night. With audible bowel sounds. No pain with palpation. Patient stated having one BM loose on 8/8/2021. No nausea this shift. Patient is saline locked, and tolerated IV Flagyl x1 this evening. Tolerating Reg diet.       /57 (BP Location: Right arm)   Pulse 72   Temp 98.7  F (37.1  C) (Oral)   Resp 16   Wt 108.7 kg (239 lb 10.2 oz)   SpO2 96%   BMI 34.63 kg/m      Shae Bruce RN on 8/8/2021 at 11:05 PM

## 2021-08-09 NOTE — PLAN OF CARE
Tolerating a regular diet. Denies any abdominal discomfort. No diarrhea. Vitals are stable. Up independently.Plan is to have an abdominal and pelvic CT if negative potential for discharge today.

## 2021-08-09 NOTE — PLAN OF CARE
Last elevated Temperature was approximately 12 hrs ago at 100.1.  Patient denies pain, abdomen soft, specific pt's request for breakfast called to [facility].  IV Flagyl infused without difficulty, SL..  Denies pain writer's shift.

## 2021-08-09 NOTE — DISCHARGE SUMMARY
[facility]  Hospitalist Discharge Summary      Date of Admission:  8/4/2021  Date of Discharge:  8/9/2021  3:12 PM  Discharging Provider: Jayshree Nickerson,        Discharge Diagnoses   Principal Problem:    Diverticulitis of colon (8/4/2021)  Active Problems:    Essential hypertension, benign (7/26/2012)    Tobacco abuse (7/26/2012)    Hyperlipidemia LDL goal <130 (3/19/2020)    Prediabetes (3/19/2020)    Personal history of peptic ulcer disease (9/20/2007)    Acute kidney injury superimposed on chronic kidney disease (H) (8/4/2021)    External hemorrhoids (8/5/2021)        Follow-ups Needed After Discharge   Follow-up Appointments     Follow-up and recommended labs and tests       Follow up with primary care provider, Angelo Domingo, within 7   days for hospital follow- up.  The following labs/tests are recommended:   BMP.          --BMP with follow-up hospital appointment  --PCP to determine when he can resume HCTZ, lisinopril, Jardiance  --Colonoscopy in 8 weeks    Unresulted Labs Ordered in the Past 30 Days of this Admission     Date and Time Order Name Status Description    8/5/2021  8:17 AM Blood Culture Hand, Left Preliminary     8/5/2021  8:17 AM Blood Culture Arm, Right Preliminary     8/5/2021  2:06 AM Blood Culture Hand, Right Preliminary     8/5/2021  2:06 AM Blood Culture Arm, Right Preliminary       These results will be followed up by Dr. Nickerson and PCP     Discharge Disposition   Discharged to home  Condition at discharge: Stable      Hospital Course       Jean Claude Snede is a 66 year old male who presented on 8/4/2021 with 1-1/2 weeks of left lower quadrant abdominal pain that was sharp and new onset of fevers.     Diverticulitis of colon    CT abdomen showed Moderate acute diverticulitis of the sigmoid colon. Reviewed with radiologist on 8/5. Not much diverticulosis but inflammatory change is of a short segment of sigmoid bowel most consistent with  diverticulitis. No evidence of perforation. First episode.     His last colonoscopy was normal 2 years ago. Pain much improved. Tolerating clears.   -Tolerated regular diet for 2 days prior to discharge  -No abdominal pain for 2 days prior to discharge  -Given ongoing fevers, although trend was improving, got CT abdomen which showed stable diverticulitis.  Given clinical picture of no pain and eating/drinking normally, will plan to discharge home with oral antibiotics   -Augmentin twice daily to complete a total of 2 weeks of antibiotics  - colonoscopy 6-8 weeks as outpatient -given that this is first episode of diverticulitis     Severe sepsis due to diverticulitis, resolved    Diagnosis based on Temp > 38 C, HR > 90 bpm, WBC > 12 and STEPHON due to sepsis. WBC trending down.  Despite other clinical improvement, he continued to have fevers, although the overall trend was improvement.  Last fever was ~18 hours prior to discharge.  Because patient was otherwise improving in all other clinical aspects, a repeat CT scan was done which showed stability, no evolving abscess or perforation.  Discharged home with oral antibiotics as above    Blood cultures NGTD 8/4 and 8/5   -Continue antibiotics as above     Acute kidney injury superimposed on chronic kidney disease (H)    Baseline creatinine 1.8, presents with creatinine 2.35    Hydrochlorothiazide, jardiance and Lisinopril are held    Creatinine is improving 2.35 -> 2.25 -> 2.14. Good urine output.    -Repeat BMP at hospital discharge follow-up.  PCP to determine when he can resume his home medications     Diabetes mellitus type 2    He is on Empagliflozin at home. HgA1c 6.8 on admission.     BG decent this admission.   - holding empagliflozin for now, PCP to resume      Hyperlipidemia LDL goal <130   - continue atorvastatin     Tobacco abuse  As needed bronchodilators.  He reports that he quit smoking 8 years ago.     Essential hypertension, benign  hydrochlorothiazide and  lisinopril are on hold until hospital discharge follow-up appointment     Personal history of peptic ulcer disease    Omeprazole as at home     External hemorrhoids  Preparation H cream prescribed at discharge     COVID status  SARS-CoV-2 PCR negative on admission  Fully vaccinated, Pfizer, April 2021, [facility] per patient     Consultations This Hospital Stay   None    Code Status   Full Code    Time Spent on this Encounter   I, Jayshree Nickerson DO, personally saw the patient today and spent greater than 30 minutes discharging this patient.       Jayshree Nicekrson DO  [facility]  [address] [address]  Phone: [phone]  Fax: [phone]  ______________________________________________________________________    Physical Exam   Vital Signs: Temp: 98.7  F (37.1  C) Temp src: Oral BP: 138/75 Pulse: 69   Resp: 16 SpO2: 96 % O2 Device: None (Room air)    Weight: 239 lbs 10.24 oz  Constitutional: Well-appearing, sitting in chair, nontoxic  ENT: MMM  Respiratory: CTAB  Cardiovascular: RRR, no murmurs  GI: Obese, soft, normal bowel sounds, no tenderness or masses  Skin: warm and dry  Musculoskeletal: 5 out of 5 strength bilateral upper and lower extremities       Primary Care Physician   Angelo Domingo    Discharge Orders      Reason for your hospital stay    You were admitted for severe sepsis due to acute diverticulitis.  You received IV antibiotics with improvement of symptoms.  Your pain resolved greater than 48 hours prior to discharge.  You were still having fevers the night prior to discharge, but these were significantly improved.  I would not expect you to have a fever except low-grade (100.8 or less).  You will be discharged with antibiotic amoxicillin-clav (Augmentin).  You will take 1 pill twice daily until gone.  You should take your first dose tonight 8/9 PM.    You will hold your empagliflozin (Jardiance), hydrochlorothiazide, lisinopril until  you follow-up with your primary care doctor in 1 week.  You should have your kidney function rechecked and your primary care doctor will determine if it is safe to restart these medications.      You should be seen right away if you continue to have fevers, or the abdominal pain returns, or you have vomiting or bloody stool.     Follow-up and recommended labs and tests     Follow up with primary care provider, Angelo Domingo, within 7 days for hospital follow- up.  The following labs/tests are recommended: BMP.     Activity    Your activity upon discharge: activity as tolerated     Diet    Follow this diet upon discharge: Orders Placed This Encounter      Advance Diet as Tolerated: Regular Diet Adult       Significant Results and Procedures   Most Recent 3 CBC's:Recent Labs   Lab Test 08/08/21  0548 08/07/21  0438 08/06/21  0457   WBC 10.9 12.6* 13.3*   HGB 14.6 14.4 14.6   MCV 88 88 89    149* 136*     Most Recent 3 BMP's:Recent Labs   Lab Test 08/09/21  1113 08/09/21  0723 08/09/21  0520 08/08/21  0548 08/07/21  0438   NA  --   --  137 135 135   POTASSIUM  --   --  4.2 3.9 3.9   CHLORIDE  --   --  106 106 105   CO2  --   --  26 22 19*   BUN  --   --  27 26 28   CR  --   --  1.71* 1.89* 2.07*   ANIONGAP  --   --  5 7 11   JORGE  --   --  8.5 8.3* 8.4*   * 114* 112* 117* 119*     Most Recent 2 LFT's:Recent Labs   Lab Test 08/04/21  1826 10/04/13  1925   AST 62* 21   ALT 70 33   ALKPHOS 149 79   BILITOTAL 1.0 0.3     Most Recent Hemoglobin A1c:Recent Labs   Lab Test 08/05/21  0536   A1C 6.8*     Most Recent 6 glucoses:Recent Labs   Lab Test 08/09/21  1113 08/09/21  0723 08/09/21  0520 08/09/21  0217 08/09/21  0052 08/08/21  2132   * 114* 112* 117* 118* 101*   ,   Results for orders placed or performed during the hospital encounter of 08/04/21   CT Abdomen Pelvis w/o Contrast    Narrative    EXAM: CT ABDOMEN PELVIS W/O CONTRAST  LOCATION: [facility]  DATE/TIME:  8/4/2021 7:16 PM    INDICATION: Diverticulitis suspected  COMPARISON: None.  TECHNIQUE: CT scan of the abdomen and pelvis was performed without IV contrast. Multiplanar reformats were obtained. Dose reduction techniques were used.  CONTRAST: None.    FINDINGS:   LOWER CHEST: Moderate calcification within the imaged portions of the mid and distal RCA. Lung bases are clear.    HEPATOBILIARY: Normal.    PANCREAS: Mild fatty replacement. Otherwise unremarkable.    SPLEEN: Normal.    ADRENAL GLANDS: Normal.    KIDNEYS/BLADDER: Normal.    BOWEL: Moderate acute sigmoid diverticulitis. Minimal underlying diverticulosis. No free air. No significant free fluid. No bowel obstruction. Normal caliber appendix.    LYMPH NODES: Normal.    VASCULATURE: Mild focal abdominal aortic ectasia measuring 3 cm at the level of the NACHO origin. Diffuse atherosclerotic calcification of the abdominal aorta.    PELVIC ORGANS: Normal.    MUSCULOSKELETAL: Normal.      Impression    IMPRESSION:   1.  Moderate acute diverticulitis of the sigmoid colon.     Chest 2 Views*    Narrative    CHEST TWO VIEWS 8/6/2021 10:57 AM     HISTORY: Cough, hypoxemia, evaluate for infiltrates.    COMPARISON: 10/4/2013.       Impression    IMPRESSION: Cardiac silhouette is within normal limits. Mild elevation  of left hemidiaphragm. No evidence for infiltrate, effusion, or  pneumothorax. Mild degenerative changes are noted through the spine.    ULI HAIRSTON MD         SYSTEM ID:  [id number]   CT Abdomen pelvis w contrast*    Narrative    CT ABDOMEN/PELVIS WITH CONTRAST August 9, 2021 12:28 PM    CLINICAL HISTORY: Abdominal pain. Diverticulitis, complication  suspected.    TECHNIQUE: CT scan of the abdomen and pelvis was performed following  injection of IV contrast. Multiplanar reformats were obtained. Dose  reduction techniques were used.  CONTRAST: 100mL Isovue-370.    COMPARISON: August 4, 2021.    FINDINGS:   LOWER CHEST: No infiltrates or  effusions.    HEPATOBILIARY: Diffuse hepatic steatosis. No significant mass. No bile  duct dilatation. No calcified gallstones.    PANCREAS: No significant mass, duct dilatation, or inflammatory  change.    SPLEEN: Normal size.    ADRENAL GLANDS: No significant nodules.    KIDNEYS/BLADDER: No significant mass, stones, or hydronephrosis.    BOWEL: Inflammation around the sigmoid colon again evident, similar in  extent. No abscess or free air.    PELVIC ORGANS: No pelvic masses.    ADDITIONAL FINDINGS: No free fluid. Stable 3.1 cm infrarenal abdominal  aortic aneurysm.    MUSCULOSKELETAL: Survey of the visualized bony structures demonstrates  no destructive bony lesions.      Impression    IMPRESSION: Uncomplicated sigmoid diverticulitis, slightly more  inflammatory change from previous.    HERNANDO PENNY MD         SYSTEM ID:  [id number]       Discharge Medications   Discharge Medication List as of 8/9/2021  3:55 PM      START taking these medications    Details   amoxicillin-clavulanate (AUGMENTIN) 875-125 MG tablet Take 1 tablet by mouth 2 times daily for 9 days, Disp-18 tablet, R-0, E-Prescribe      phenylephrine-mineral oil-petrolatum (PREPARATION H) 0.25-14-74.9 % rectal ointment Place rectally 2 times daily as needed for hemorrhoidsDisp-28 g, [id number]         CONTINUE these medications which have NOT CHANGED    Details   aspirin 81 MG EC tablet Take 81 mg by mouth daily., Historical      atorvastatin (LIPITOR) 20 MG tablet Take 1 tablet (20 mg) by mouth daily Patient takes 1/2 tablet daily, Disp-45 tablet, R-3, E-Prescribe      omeprazole (PRILOSEC) 40 MG DR capsule Take 1 capsule (40 mg) by mouth every evening, Disp-90 capsule, R-3, E-Prescribe      acetaminophen (TYLENOL) 500 MG tablet Take 500-1,000 mg by mouth every 6 hours as needed for mild pain Will take PRN if headache. Patient takes half of the 500 MG tablet., Historical      albuterol (PROAIR HFA/PROVENTIL HFA/VENTOLIN HFA) 108 (90 Base) MCG/ACT  "inhaler Inhale 2 puffs into the lungs every 4 hours as needed for shortness of breath / dyspnea or wheezing, Disp-1 Inhaler, R-11, E-PrescribePharmacy may dispense brand covered by insurance (Proair, or proventil or ventolin or generic albuterol inhaler)      allopurinol (ZYLOPRIM) 100 MG tablet Take 1 tablet (100 mg) by mouth daily, Disp-90 tablet, R-3, E-Prescribe      fluticasone (FLONASE) 50 MCG/ACT nasal spray Spray 2 sprays into both nostrils daily as needed For nasal congestion, Historical         STOP taking these medications       empagliflozin (JARDIANCE) 25 MG TABS tablet Comments:   Reason for Stopping:         hydrochlorothiazide (HYDRODIURIL) 25 MG tablet Comments:   Reason for Stopping:         lisinopril (ZESTRIL) 40 MG tablet Comments:   Reason for Stopping:             Allergies   Allergies   Allergen Reactions     Beta Adrenergic Blockers Other (See Comments)     Hands arms and feet cold      Gadavist [Gadobutrol] Hives and Itching     Patient developed hives on forehead and anterior abdomen with itchiness of eyes and face within 30 seconds of Gadavist contrast injection. Symptoms started feeling better within about 15 minutes of injection.         Gadavist - 10 mL - Lot #: [id number] - NDC 86039-481-73     Azelastine Other (See Comments) and Dizziness     \"shaky\" and sick to stomach     Ciprofloxacin Other (See Comments) and Dizziness     and sick to stomach     Diltiazem Other (See Comments)     Blood pressure up and pulse up then down      Ibuprofen Other (See Comments)     Hypertension     Naproxen Other (See Comments)     Feels sick, like coming down with the stomach flu     Citalopram Anxiety and Other (See Comments)     Agitation, Side effects started in less than an hour after first pill     "

## 2021-08-09 NOTE — DISCHARGE INSTRUCTIONS
You were admitted for severe sepsis due to acute diverticulitis.  You received IV antibiotics with improvement of symptoms.  Your pain resolved greater than 48 hours prior to discharge.  You were still having fevers the night prior to discharge, but these were significantly improved.  I would not expect you to have a fever except low-grade (100.8 or less).  You will be discharged with antibiotic amoxicillin-clav (Augmentin).  You will take 1 pill twice daily until gone.  You should take your first dose tonight 8/9 PM.    You will hold your empagliflozin (Jardiance for diabetes), hydrochlorothiazide (for blood pressure), lisinopril (for blood pressure) until you follow-up with your primary care doctor in 1 week.  You should have your kidney function rechecked and your primary care doctor will determine if it is safe to restart these medications.      You should be seen right away if you continue to have fevers, or the abdominal pain returns, or you have vomiting or bloody stool.    When you follow-up with your primary care physician, they should order a colonoscopy to be completed about 2 months from now.  This should be done to ensure the diverticulitis is healed, and there is no underlying colon cancer that triggered the episode.

## 2021-08-10 ENCOUNTER — PATIENT OUTREACH (OUTPATIENT)
Dept: CARE COORDINATION | Facility: CLINIC | Age: 66
End: 2021-08-10

## 2021-08-10 DIAGNOSIS — Z71.89 OTHER SPECIFIED COUNSELING: ICD-10-CM

## 2021-08-10 LAB
BACTERIA BLD CULT: NO GROWTH

## 2021-08-10 NOTE — PROGRESS NOTES
Clinic Care Coordination Contact  [facility]: Post-Discharge Note  SITUATION                                                      Admission:    Admission Date: 08/04/21   Reason for Admission: Diverticulitis of colon  Discharge:   Discharge Date: 08/09/21  Discharge Diagnosis: Diverticulitis of colon    BACKGROUND                                                      Jean Claude Sneed is a 66 year old male who presented on 8/4/2021 with 1-1/2 weeks of left lower quadrant abdominal pain that was sharp and new onset of fevers.    ASSESSMENT      Discharge Assessment  How are you doing now that you are home?: I feel great  How are your symptoms? (Red Flag symptoms escalate to triage hotline per guidelines): Improved  Do you feel your condition is stable enough to be safe at home until your provider visit?: Yes  Does the patient have their discharge instructions? : Yes  Does the patient have questions regarding their discharge instructions? : No  Were you started on any new medications or were there changes to any of your previous medications? : Yes - Schedule [facility] appt within 48 business hours  Does the patient have all of their medications?: Yes  Do you have questions regarding any of your medications? : No  Do you have all of your needed medical supplies or equipment (DME)?  (i.e. oxygen tank, CPAP, cane, etc.): Yes  Discharge follow-up appointment scheduled within 14 calendar days? : No  Is patient agreeable to assistance with scheduling? : No (PCP not in System)    Post-op  Did the patient have surgery or a procedure: No  Fever: No  Chills: No  Eating & Drinking: eating and drinking without complaints/concerns  PO Intake: regular diet  Bowel Function:  (soft)  Date of last BM: 08/10/21  Urinary Status: voiding without complaint/concerns      PLAN                                                      Outpatient Plan:  Follow-up Appointments     Follow-up and recommended labs and tests       Follow up with  primary care provider, Angelo Domingo, within 7   days for hospital follow- up.  The following labs/tests are recommended:   BMP.       No future appointments.      For any urgent concerns, please contact our 24 hour nurse triage line: [phone] ([phone])         Sydney Del Castillo   Community Health Worker   [facility] [place]  Ph: [phone]  Fx: [phone]

## 2022-07-20 ENCOUNTER — HOSPITAL ENCOUNTER (EMERGENCY)
Facility: CLINIC | Age: 67
Discharge: HOME | End: 2022-07-21
Attending: EMERGENCY MEDICINE | Admitting: EMERGENCY MEDICINE
Payer: COMMERCIAL

## 2022-07-20 VITALS
HEART RATE: 79 BPM | DIASTOLIC BLOOD PRESSURE: 102 MMHG | BODY MASS INDEX: 34.07 KG/M2 | TEMPERATURE: 98.4 F | WEIGHT: 238 LBS | SYSTOLIC BLOOD PRESSURE: 176 MMHG | OXYGEN SATURATION: 97 % | HEIGHT: 70 IN

## 2022-07-20 DIAGNOSIS — H10.32 ACUTE CONJUNCTIVITIS OF LEFT EYE, UNSPECIFIED ACUTE CONJUNCTIVITIS TYPE: ICD-10-CM

## 2022-07-20 DIAGNOSIS — S05.02XA ABRASION OF LEFT CORNEA, INITIAL ENCOUNTER: ICD-10-CM

## 2022-07-20 PROCEDURE — 99283 EMERGENCY DEPT VISIT LOW MDM: CPT | Performed by: EMERGENCY MEDICINE

## 2022-07-20 PROCEDURE — 99284 EMERGENCY DEPT VISIT MOD MDM: CPT | Performed by: EMERGENCY MEDICINE

## 2022-07-21 PROCEDURE — 250N000013 HC RX MED GY IP 250 OP 250 PS 637: Performed by: EMERGENCY MEDICINE

## 2022-07-21 PROCEDURE — 250N000009 HC RX 250: Performed by: EMERGENCY MEDICINE

## 2022-07-21 RX ORDER — TETRACAINE HYDROCHLORIDE 5 MG/ML
1-2 SOLUTION OPHTHALMIC ONCE
Status: COMPLETED | OUTPATIENT
Start: 2022-07-21 | End: 2022-07-21

## 2022-07-21 RX ORDER — OFLOXACIN 3 MG/ML
1-2 SOLUTION/ DROPS OPHTHALMIC
Qty: 5 ML | Refills: 0 | Status: SHIPPED | OUTPATIENT
Start: 2022-07-21 | End: 2022-07-26

## 2022-07-21 RX ORDER — ACETAMINOPHEN 500 MG
1000 TABLET ORAL ONCE
Status: COMPLETED | OUTPATIENT
Start: 2022-07-21 | End: 2022-07-21

## 2022-07-21 RX ORDER — OFLOXACIN 3 MG/ML
2 SOLUTION/ DROPS OPHTHALMIC 4 TIMES DAILY
Status: DISCONTINUED | OUTPATIENT
Start: 2022-07-21 | End: 2022-07-21

## 2022-07-21 RX ADMIN — TETRACAINE HYDROCHLORIDE 2 DROP: 5 SOLUTION OPHTHALMIC at 02:47

## 2022-07-21 RX ADMIN — ACETAMINOPHEN 500 MG: 500 TABLET, FILM COATED ORAL at 02:45

## 2022-07-21 ASSESSMENT — ENCOUNTER SYMPTOMS
BACK PAIN: 0
EYE PAIN: 1
EYE ITCHING: 0
SHORTNESS OF BREATH: 0
PHOTOPHOBIA: 1
HEADACHES: 0
LIGHT-HEADEDNESS: 0
COUGH: 0
EYE DISCHARGE: 1
EYE REDNESS: 0
CHILLS: 0
ABDOMINAL PAIN: 0

## 2022-07-21 NOTE — ED PROVIDER NOTES
"  History     Chief Complaint   Patient presents with     Foreign Body Left Eye     HPI  Jean Claude Sneed is a 67 year old male with no significant past medical history presenting for evaluation of pain and drainage from his left eye.  Was feeling well through the day and this evening while petting his cat he noticed some irritation in his eye.  Symptoms progressively worsened over the next few hours with a gritty sensation in his eye with some clear watering drainage.  No known foreign body to the eye.  Does not wear contact lenses.  Now having constant discomfort leading to difficulty opening his eye.    Allergies:  Allergies   Allergen Reactions     Beta Adrenergic Blockers Other (See Comments)     Hands arms and feet cold      Gadavist [Gadobutrol] Hives and Itching     Patient developed hives on forehead and anterior abdomen with itchiness of eyes and face within 30 seconds of Gadavist contrast injection. Symptoms started feeling better within about 15 minutes of injection.         Gadavist - 10 mL - Lot #: [id number] - NDC 01104-427-84     Azelastine Other (See Comments) and Dizziness     \"shaky\" and sick to stomach     Ciprofloxacin Other (See Comments) and Dizziness     and sick to stomach     Diltiazem Other (See Comments)     Blood pressure up and pulse up then down      Ibuprofen Other (See Comments)     Hypertension     Naproxen Other (See Comments)     Feels sick, like coming down with the stomach flu     Citalopram Anxiety and Other (See Comments)     Agitation, Side effects started in less than an hour after first pill       Problem List:    Patient Active Problem List    Diagnosis Date Noted     External hemorrhoids 08/05/2021     Priority: Medium     Diverticulitis of colon 08/04/2021     Priority: Medium     Acute kidney injury superimposed on chronic kidney disease (H) 08/04/2021     Priority: Medium     Hyperlipidemia LDL goal <130 03/19/2020     Priority: Medium     Chronic kidney disease, stage 2 " (mild) 2020     Priority: Medium     Prediabetes 2020     Priority: Medium     Essential hypertension, benign 2012     Priority: Medium     Tobacco abuse 2012     Priority: Medium     Quit in !!!       PLMD (periodic limb movement disorder) 2011     Priority: Medium     Personal history of peptic ulcer disease 2007     Priority: Medium     Formatting of this note might be different from the original.  GI BLEED       Esophageal reflux 2007     Priority: Medium        Past Medical History:    Past Medical History:   Diagnosis Date     HTN (hypertension)        Past Surgical History:    Past Surgical History:   Procedure Laterality Date     left knee surgery         Family History:    Family History   Problem Relation Age of Onset     Hypertension Mother      Hypertension Father      Hypertension Brother      Hypertension Brother      Hypertension Brother      Hypertension Brother      Cancer Brother         prostate     Hypertension Brother      Hypertension Brother      Diabetes Brother      Cancer Brother      Hypertension Brother      Cancer Brother         prostate       Social History:  Marital Status:   [5]  Social History     Tobacco Use     Smoking status: Former Smoker     Packs/day: 0.50     Years: 30.00     Pack years: 15.00     Types: Cigarettes     Quit date: 2014     Years since quittin.5     Smokeless tobacco: Never Used   Substance Use Topics     Alcohol use: Yes     Comment: occassional wine     Drug use: Not Currently        Medications:    ofloxacin (OCUFLOX) 0.3 % ophthalmic solution  acetaminophen (TYLENOL) 500 MG tablet  albuterol (PROAIR HFA/PROVENTIL HFA/VENTOLIN HFA) 108 (90 Base) MCG/ACT inhaler  allopurinol (ZYLOPRIM) 100 MG tablet  aspirin 81 MG EC tablet  atorvastatin (LIPITOR) 20 MG tablet  fluticasone (FLONASE) 50 MCG/ACT nasal spray  omeprazole (PRILOSEC) 40 MG DR capsule  phenylephrine-mineral oil-petrolatum (PREPARATION  "H) 0.25-14-74.9 % rectal ointment          Review of Systems   Constitutional: Negative for chills.   Eyes: Positive for photophobia, pain and discharge. Negative for redness, itching and visual disturbance.   Respiratory: Negative for cough and shortness of breath.    Cardiovascular: Negative for chest pain.   Gastrointestinal: Negative for abdominal pain.   Musculoskeletal: Negative for back pain.   Neurological: Negative for light-headedness and headaches.   All other systems reviewed and are negative.      Physical Exam   BP: (!) 176/102  Pulse: 79  Temp: 98.4  F (36.9  C)  Height: 177.8 cm (5' 10\")  Weight: 108 kg (238 lb)  SpO2: 97 %      Physical Exam  Vitals and nursing note reviewed.   Constitutional:       Appearance: Normal appearance. He is not ill-appearing or diaphoretic.   HENT:      Head: Atraumatic.      Nose: Nose normal.      Mouth/Throat:      Mouth: Mucous membranes are moist.   Eyes:      Comments: Left eye conjunctiva injected.  No visible foreign body.  Slit-lamp exam showed small corneal abrasion around the 11 o'clock position.   Cardiovascular:      Rate and Rhythm: Normal rate.      Pulses: Normal pulses.   Pulmonary:      Effort: Pulmonary effort is normal.   Neurological:      Mental Status: He is alert and oriented to person, place, and time.   Psychiatric:         Mood and Affect: Mood normal.         ED Course                 Procedures                No results found for this or any previous visit (from the past 24 hour(s)).    Medications   tetracaine (PONTOCAINE) 0.5 % ophthalmic solution 1-2 drop (has no administration in time range)       Assessments & Plan (with Medical Decision Making)  67-year-old male presenting for evaluation of painful left eye.  No known foreign body and none seen on exam.  Has a small abrasion present with conjunctival injection.  Does not wear contact lenses.  Started empirically on ofloxacin eyedrops with plan for ophthalmology follow-up.     I have " reviewed the nursing notes.    I have reviewed the findings, diagnosis, plan and need for follow up with the patient.       New Prescriptions    OFLOXACIN (OCUFLOX) 0.3 % OPHTHALMIC SOLUTION    Place 1-2 drops Into the left eye every 2 hours (while awake) for 5 days       Final diagnoses:   Abrasion of left cornea, initial encounter   Acute conjunctivitis of left eye, unspecified acute conjunctivitis type       7/20/2022   [facility] EMERGENCY DEPT     Abreu, Altaf Stephens MD  07/21/22 0464

## 2022-07-21 NOTE — ED TRIAGE NOTES
"  Pt \"felt something go into my eye.\"  Pt attempted to flush with no results.   Triage Assessment     Row Name 07/20/22 [id number]       Triage Assessment (Adult)    Airway WDL WDL       Respiratory WDL    Respiratory WDL WDL       Skin Circulation/Temperature WDL    Skin Circulation/Temperature WDL WDL       Cardiac WDL    Cardiac WDL WDL       Peripheral/Neurovascular WDL    Peripheral Neurovascular WDL WDL       Cognitive/Neuro/Behavioral WDL    Cognitive/Neuro/Behavioral WDL WDL              "

## 2023-02-16 ENCOUNTER — APPOINTMENT (OUTPATIENT)
Dept: GENERAL RADIOLOGY | Facility: CLINIC | Age: 68
End: 2023-02-16
Attending: FAMILY MEDICINE
Payer: COMMERCIAL

## 2023-02-16 ENCOUNTER — HOSPITAL ENCOUNTER (EMERGENCY)
Facility: CLINIC | Age: 68
Discharge: ANOTHER HOSPITAL | End: 2023-02-16
Attending: FAMILY MEDICINE | Admitting: FAMILY MEDICINE
Payer: COMMERCIAL

## 2023-02-16 VITALS
RESPIRATION RATE: 12 BRPM | BODY MASS INDEX: 35.01 KG/M2 | OXYGEN SATURATION: 93 % | HEART RATE: 96 BPM | SYSTOLIC BLOOD PRESSURE: 94 MMHG | WEIGHT: 244 LBS | TEMPERATURE: 98.1 F | DIASTOLIC BLOOD PRESSURE: 53 MMHG

## 2023-02-16 DIAGNOSIS — I21.4 NSTEMI (NON-ST ELEVATED MYOCARDIAL INFARCTION) (H): ICD-10-CM

## 2023-02-16 LAB
ALBUMIN SERPL BCG-MCNC: 4.2 G/DL (ref 3.5–5.2)
ALP SERPL-CCNC: 125 U/L (ref 40–129)
ALT SERPL W P-5'-P-CCNC: 38 U/L (ref 10–50)
ANION GAP SERPL CALCULATED.3IONS-SCNC: 13 MMOL/L (ref 7–15)
AST SERPL W P-5'-P-CCNC: 35 U/L (ref 10–50)
BASOPHILS # BLD AUTO: 0 10E3/UL (ref 0–0.2)
BASOPHILS NFR BLD AUTO: 1 %
BILIRUB SERPL-MCNC: 0.4 MG/DL
BUN SERPL-MCNC: 31.9 MG/DL (ref 8–23)
CALCIUM SERPL-MCNC: 9.8 MG/DL (ref 8.8–10.2)
CHLORIDE SERPL-SCNC: 99 MMOL/L (ref 98–107)
CREAT SERPL-MCNC: 1.73 MG/DL (ref 0.67–1.17)
D DIMER PPP FEU-MCNC: 0.48 UG/ML FEU (ref 0–0.5)
DEPRECATED HCO3 PLAS-SCNC: 24 MMOL/L (ref 22–29)
EOSINOPHIL # BLD AUTO: 0.2 10E3/UL (ref 0–0.7)
EOSINOPHIL NFR BLD AUTO: 2 %
ERYTHROCYTE [DISTWIDTH] IN BLOOD BY AUTOMATED COUNT: 12.6 % (ref 10–15)
ERYTHROCYTE [DISTWIDTH] IN BLOOD BY AUTOMATED COUNT: 12.6 % (ref 10–15)
FLUAV RNA SPEC QL NAA+PROBE: NEGATIVE
FLUBV RNA RESP QL NAA+PROBE: NEGATIVE
GFR SERPL CREATININE-BSD FRML MDRD: 42 ML/MIN/1.73M2
GLUCOSE SERPL-MCNC: 345 MG/DL (ref 70–99)
HCT VFR BLD AUTO: 53.5 % (ref 40–53)
HCT VFR BLD AUTO: 54.4 % (ref 40–53)
HGB BLD-MCNC: 18.1 G/DL (ref 13.3–17.7)
HGB BLD-MCNC: 18.2 G/DL (ref 13.3–17.7)
HOLD SPECIMEN: NORMAL
HOLD SPECIMEN: NORMAL
IMM GRANULOCYTES # BLD: 0 10E3/UL
IMM GRANULOCYTES NFR BLD: 0 %
INR PPP: 0.96 (ref 0.85–1.15)
LYMPHOCYTES # BLD AUTO: 2.7 10E3/UL (ref 0.8–5.3)
LYMPHOCYTES NFR BLD AUTO: 35 %
MCH RBC QN AUTO: 29.8 PG (ref 26.5–33)
MCH RBC QN AUTO: 30.5 PG (ref 26.5–33)
MCHC RBC AUTO-ENTMCNC: 33.5 G/DL (ref 31.5–36.5)
MCHC RBC AUTO-ENTMCNC: 33.8 G/DL (ref 31.5–36.5)
MCV RBC AUTO: 89 FL (ref 78–100)
MCV RBC AUTO: 90 FL (ref 78–100)
MONOCYTES # BLD AUTO: 0.8 10E3/UL (ref 0–1.3)
MONOCYTES NFR BLD AUTO: 11 %
NEUTROPHILS # BLD AUTO: 4.2 10E3/UL (ref 1.6–8.3)
NEUTROPHILS NFR BLD AUTO: 51 %
NRBC # BLD AUTO: 0 10E3/UL
NRBC BLD AUTO-RTO: 0 /100
PLATELET # BLD AUTO: 174 10E3/UL (ref 150–450)
PLATELET # BLD AUTO: 189 10E3/UL (ref 150–450)
POTASSIUM SERPL-SCNC: 4.9 MMOL/L (ref 3.4–5.3)
PROT SERPL-MCNC: 8 G/DL (ref 6.4–8.3)
RBC # BLD AUTO: 5.94 10E6/UL (ref 4.4–5.9)
RBC # BLD AUTO: 6.11 10E6/UL (ref 4.4–5.9)
RSV RNA SPEC NAA+PROBE: NEGATIVE
SARS-COV-2 RNA RESP QL NAA+PROBE: NEGATIVE
SODIUM SERPL-SCNC: 136 MMOL/L (ref 136–145)
TROPONIN T SERPL HS-MCNC: 112 NG/L
TROPONIN T SERPL HS-MCNC: 188 NG/L
TROPONIN T SERPL HS-MCNC: 638 NG/L
UFH PPP CHRO-ACNC: 0.52 IU/ML
WBC # BLD AUTO: 7.9 10E3/UL (ref 4–11)
WBC # BLD AUTO: 9.6 10E3/UL (ref 4–11)

## 2023-02-16 PROCEDURE — 250N000013 HC RX MED GY IP 250 OP 250 PS 637: Performed by: FAMILY MEDICINE

## 2023-02-16 PROCEDURE — 85610 PROTHROMBIN TIME: CPT | Performed by: FAMILY MEDICINE

## 2023-02-16 PROCEDURE — 96376 TX/PRO/DX INJ SAME DRUG ADON: CPT | Performed by: FAMILY MEDICINE

## 2023-02-16 PROCEDURE — 93010 ELECTROCARDIOGRAM REPORT: CPT | Performed by: FAMILY MEDICINE

## 2023-02-16 PROCEDURE — 99285 EMERGENCY DEPT VISIT HI MDM: CPT | Mod: 25 | Performed by: FAMILY MEDICINE

## 2023-02-16 PROCEDURE — 85027 COMPLETE CBC AUTOMATED: CPT | Performed by: FAMILY MEDICINE

## 2023-02-16 PROCEDURE — 85025 COMPLETE CBC W/AUTO DIFF WBC: CPT | Performed by: FAMILY MEDICINE

## 2023-02-16 PROCEDURE — 85520 HEPARIN ASSAY: CPT | Performed by: FAMILY MEDICINE

## 2023-02-16 PROCEDURE — 85379 FIBRIN DEGRADATION QUANT: CPT | Performed by: FAMILY MEDICINE

## 2023-02-16 PROCEDURE — 36415 COLL VENOUS BLD VENIPUNCTURE: CPT | Performed by: FAMILY MEDICINE

## 2023-02-16 PROCEDURE — 84484 ASSAY OF TROPONIN QUANT: CPT | Performed by: FAMILY MEDICINE

## 2023-02-16 PROCEDURE — 71046 X-RAY EXAM CHEST 2 VIEWS: CPT

## 2023-02-16 PROCEDURE — 93010 ELECTROCARDIOGRAM REPORT: CPT | Mod: 76 | Performed by: FAMILY MEDICINE

## 2023-02-16 PROCEDURE — 93005 ELECTROCARDIOGRAM TRACING: CPT | Performed by: FAMILY MEDICINE

## 2023-02-16 PROCEDURE — 96365 THER/PROPH/DIAG IV INF INIT: CPT | Performed by: FAMILY MEDICINE

## 2023-02-16 PROCEDURE — 80053 COMPREHEN METABOLIC PANEL: CPT | Performed by: FAMILY MEDICINE

## 2023-02-16 PROCEDURE — 96366 THER/PROPH/DIAG IV INF ADDON: CPT | Performed by: FAMILY MEDICINE

## 2023-02-16 PROCEDURE — 250N000011 HC RX IP 250 OP 636: Performed by: FAMILY MEDICINE

## 2023-02-16 PROCEDURE — 93005 ELECTROCARDIOGRAM TRACING: CPT | Mod: 76 | Performed by: FAMILY MEDICINE

## 2023-02-16 PROCEDURE — 87637 SARSCOV2&INF A&B&RSV AMP PRB: CPT | Performed by: FAMILY MEDICINE

## 2023-02-16 RX ORDER — ASPIRIN 81 MG/1
324 TABLET, CHEWABLE ORAL ONCE
Status: COMPLETED | OUTPATIENT
Start: 2023-02-16 | End: 2023-02-16

## 2023-02-16 RX ORDER — ALLOPURINOL 100 MG/1
100 TABLET ORAL DAILY
Status: DISCONTINUED | OUTPATIENT
Start: 2023-02-17 | End: 2023-02-16

## 2023-02-16 RX ORDER — NITROGLYCERIN 0.4 MG/1
0.4 TABLET SUBLINGUAL EVERY 5 MIN PRN
Status: DISCONTINUED | OUTPATIENT
Start: 2023-02-16 | End: 2023-02-17 | Stop reason: HOSPADM

## 2023-02-16 RX ORDER — ACETAMINOPHEN 500 MG
500 TABLET ORAL ONCE
Status: DISCONTINUED | OUTPATIENT
Start: 2023-02-16 | End: 2023-02-16

## 2023-02-16 RX ORDER — PANTOPRAZOLE SODIUM 40 MG/1
40 TABLET, DELAYED RELEASE ORAL DAILY
Status: DISCONTINUED | OUTPATIENT
Start: 2023-02-17 | End: 2023-02-17 | Stop reason: HOSPADM

## 2023-02-16 RX ORDER — ACETAMINOPHEN 500 MG
1000 TABLET ORAL ONCE
Status: COMPLETED | OUTPATIENT
Start: 2023-02-16 | End: 2023-02-16

## 2023-02-16 RX ORDER — ASPIRIN 81 MG/1
81 TABLET, CHEWABLE ORAL DAILY
Status: DISCONTINUED | OUTPATIENT
Start: 2023-02-17 | End: 2023-02-17 | Stop reason: HOSPADM

## 2023-02-16 RX ORDER — HEPARIN SODIUM 10000 [USP'U]/100ML
0-5000 INJECTION, SOLUTION INTRAVENOUS CONTINUOUS
Status: DISCONTINUED | OUTPATIENT
Start: 2023-02-16 | End: 2023-02-17 | Stop reason: HOSPADM

## 2023-02-16 RX ORDER — ATORVASTATIN CALCIUM 20 MG/1
20 TABLET, FILM COATED ORAL EVERY EVENING
Status: DISCONTINUED | OUTPATIENT
Start: 2023-02-16 | End: 2023-02-17 | Stop reason: HOSPADM

## 2023-02-16 RX ORDER — METOPROLOL TARTRATE 25 MG/1
25 TABLET, FILM COATED ORAL 2 TIMES DAILY
Status: DISCONTINUED | OUTPATIENT
Start: 2023-02-16 | End: 2023-02-17 | Stop reason: HOSPADM

## 2023-02-16 RX ADMIN — HEPARIN SODIUM 1200 UNITS/HR: 10000 INJECTION, SOLUTION INTRAVENOUS at 16:05

## 2023-02-16 RX ADMIN — METOPROLOL TARTRATE 25 MG: 25 TABLET, FILM COATED ORAL at 22:35

## 2023-02-16 RX ADMIN — ATORVASTATIN CALCIUM 20 MG: 20 TABLET, FILM COATED ORAL at 22:35

## 2023-02-16 RX ADMIN — ASPIRIN 81 MG CHEWABLE TABLET 324 MG: 81 TABLET CHEWABLE at 14:51

## 2023-02-16 RX ADMIN — ACETAMINOPHEN 500 MG: 500 TABLET ORAL at 22:19

## 2023-02-16 ASSESSMENT — ENCOUNTER SYMPTOMS
CHILLS: 0
COUGH: 0
ABDOMINAL PAIN: 0
DIAPHORESIS: 0
SORE THROAT: 0
CONSTIPATION: 0
VOMITING: 0
DYSURIA: 0
SHORTNESS OF BREATH: 1
FEVER: 0
DIARRHEA: 0
SINUS PRESSURE: 0
PALPITATIONS: 0
HEADACHES: 0
WHEEZING: 0
NAUSEA: 0
BLOOD IN STOOL: 0
FREQUENCY: 0

## 2023-02-16 ASSESSMENT — ACTIVITIES OF DAILY LIVING (ADL)
ADLS_ACUITY_SCORE: 35

## 2023-02-16 NOTE — ED PROVIDER NOTES
History     Chief Complaint   Patient presents with     Chest Pain     Nausea     Chest pain since 13:00, nausea, no emesis some SOA pt vague on reporting his symptoms     HPI  Jean Claude Sneed is a 67 year old male who presents with a history of hypertension prior tobacco abuse hyperlipidemia esophageal reflux.  Episodic bright red blood per rectum associated with external hemorrhoids that is ceased recently.  Presents here with onset about a month ago of some increased dyspnea on exertion with activities such as going out to the mailbox but also with these episodes brief episodes of anterior chest pain without radiation lasting about 5 minutes and resolving.  This is been ongoing during this time and not escalating but then today around 1:00 with exertion he had onset of chest pain that persisted for about 30 minutes until he arrived here.  His pain resolved.  This was by the time he was in triage and nitroglycerin ordered and ST depression was seen on EKG but denied any chest pain pressure ache sensation or other equivalents to chest pain and no significant dyspnea at rest.  No nausea or vomiting no sweats.  No known VTE risk but he does note that he does sit for prolonged periods of time.  Heart rate 110 on arrival.    Denies recent prolonged travel (>3 hours) by car or plane, history or FHx of venous thromboembolism, recent surgery (last 4 weeks), active cancer history, hypercoagulable state, estrogen or other medications/conditions causing VTE or  new unilateral swelling or pain in the legs or calves.      Allergies:  Allergies   Allergen Reactions     Beta Adrenergic Blockers Other (See Comments)     Hands arms and feet cold      Gadavist [Gadobutrol] Hives and Itching     Patient developed hives on forehead and anterior abdomen with itchiness of eyes and face within 30 seconds of Gadavist contrast injection. Symptoms started feeling better within about 15 minutes of injection.         Gadavist - 10 mL -  "Lot #: [id number] - NDC 42919-744-36     Azelastine Other (See Comments) and Dizziness     \"shaky\" and sick to stomach     Ciprofloxacin Other (See Comments) and Dizziness     and sick to stomach     Diltiazem Other (See Comments)     Blood pressure up and pulse up then down      Ibuprofen Other (See Comments)     Hypertension     Naproxen Other (See Comments)     Feels sick, like coming down with the stomach flu     Citalopram Anxiety and Other (See Comments)     Agitation, Side effects started in less than an hour after first pill       Problem List:    Patient Active Problem List    Diagnosis Date Noted     External hemorrhoids 08/05/2021     Priority: Medium     Diverticulitis of colon 08/04/2021     Priority: Medium     Acute kidney injury superimposed on chronic kidney disease (H) 08/04/2021     Priority: Medium     Hyperlipidemia LDL goal <130 03/19/2020     Priority: Medium     Chronic kidney disease, stage 2 (mild) 03/19/2020     Priority: Medium     Prediabetes 03/19/2020     Priority: Medium     Essential hypertension, benign 07/26/2012     Priority: Medium     Tobacco abuse 07/26/2012     Priority: Medium     Quit in 2014!!!       PLMD (periodic limb movement disorder) 04/05/2011     Priority: Medium     Personal history of peptic ulcer disease 09/20/2007     Priority: Medium     Formatting of this note might be different from the original.  GI BLEED       Esophageal reflux 09/20/2007     Priority: Medium        Past Medical History:    Past Medical History:   Diagnosis Date     HTN (hypertension)        Past Surgical History:    Past Surgical History:   Procedure Laterality Date     left knee surgery  1996       Family History:    Family History   Problem Relation Age of Onset     Hypertension Mother      Hypertension Father      Hypertension Brother      Hypertension Brother      Hypertension Brother      Hypertension Brother      Cancer Brother         prostate     Hypertension Brother      Hypertension " Brother      Diabetes Brother      Cancer Brother      Hypertension Brother      Cancer Brother         prostate       Social History:  Marital Status:   [5]  Social History     Tobacco Use     Smoking status: Former     Packs/day: 0.50     Years: 30.00     Pack years: 15.00     Types: Cigarettes     Quit date: 2014     Years since quittin.1     Smokeless tobacco: Never   Substance Use Topics     Alcohol use: Yes     Comment: occassional wine     Drug use: Not Currently        Medications:    acetaminophen (TYLENOL) 500 MG tablet  albuterol (PROAIR HFA/PROVENTIL HFA/VENTOLIN HFA) 108 (90 Base) MCG/ACT inhaler  allopurinol (ZYLOPRIM) 100 MG tablet  aspirin 81 MG EC tablet  atorvastatin (LIPITOR) 20 MG tablet  fluticasone (FLONASE) 50 MCG/ACT nasal spray  omeprazole (PRILOSEC) 40 MG DR capsule  phenylephrine-mineral oil-petrolatum (PREPARATION H) 0.25-14-74.9 % rectal ointment          Review of Systems   Constitutional: Negative for chills, diaphoresis and fever.   HENT: Negative for ear pain, sinus pressure and sore throat.    Eyes: Negative for visual disturbance.   Respiratory: Positive for shortness of breath. Negative for cough and wheezing.    Cardiovascular: Positive for chest pain. Negative for palpitations.   Gastrointestinal: Negative for abdominal pain, blood in stool, constipation, diarrhea, nausea and vomiting.   Genitourinary: Negative for dysuria, frequency and urgency.   Skin: Negative for rash.   Neurological: Negative for headaches.   All other systems reviewed and are negative.      Physical Exam   BP: (!) 160/94  Pulse: 98  Temp: 98.1  F (36.7  C)  Resp: 18  Weight: 110.7 kg (244 lb)  SpO2: 94 %      Physical Exam  Constitutional:       General: He is in acute distress.      Appearance: He is not diaphoretic.   HENT:      Head: Atraumatic.   Eyes:      Conjunctiva/sclera: Conjunctivae normal.   Cardiovascular:      Rate and Rhythm: Regular rhythm. Tachycardia present.      Heart  sounds: No murmur heard.  Pulmonary:      Effort: Pulmonary effort is normal. No respiratory distress.      Breath sounds: Normal breath sounds. No stridor. No wheezing or rhonchi.   Abdominal:      General: Abdomen is flat. There is no distension.      Palpations: Abdomen is soft. There is no mass.      Tenderness: There is no abdominal tenderness. There is no guarding.   Musculoskeletal:      Right lower leg: No edema.      Left lower leg: No edema.   Skin:     Coloration: Skin is not pale.      Findings: No rash.   Neurological:      General: No focal deficit present.      Mental Status: He is alert.      Motor: No weakness.         ED Course                Procedures                EKG Interpretation:      Interpreted by Yoni Mercado MD  EKG done at 1426 hrs. demonstrates a sinus rhythm at 102 bpm with a normal axis.  There is ST depression in leads V4, V5, V6.  No ST elevation.  No significant T wave changes.  Normal R progression.  No Q waves.  Normal intervals.  Normal conduction.  No ectopy.  Impression sinus rhythm at 102 bpm with ST depression laterally.  This is new from EKG in 2021.           EKG Interpretation:      Interpreted by Yoni Mercado MD  EKG done at 1522 hrs. demonstrates a sinus rhythm 96 bpm normal axis.  There is ST depression in V3 and V4.  No other ST change.  There is a normal R progression.  No Q waves.  Normal intervals.  Normal conduction.  No ectopy.  Impression sinus rhythm 96 bpm ST depression V3 V4           EKG Interpretation:      Interpreted by Yoni Mercado MD  EKG done at 2116 demonstrates a sinus rhythm at 83 bpm with a borderline axis.  There are inferior Q waves.  No ST change.  That has resolved since the last EKG.  No T wave changes.  Impression sinus rhythm 83 bpm with resolution of ST depression laterally.  There are Q waves inferiorly lead III and aVF.  He continues to be pain-free.    Critical Care time:  none               Results for orders placed or  performed during the hospital encounter of 02/16/23 (from the past 24 hour(s))   CBC with platelets differential    Narrative    The following orders were created for panel order CBC with platelets differential.  Procedure                               Abnormality         Status                     ---------                               -----------         ------                     CBC with platelets and d...[[id number]]  Abnormal            Final result                 Please view results for these tests on the individual orders.   Comprehensive metabolic panel   Result Value Ref Range    Sodium 136 136 - 145 mmol/L    Potassium 4.9 3.4 - 5.3 mmol/L    Chloride 99 98 - 107 mmol/L    Carbon Dioxide (CO2) 24 22 - 29 mmol/L    Anion Gap 13 7 - 15 mmol/L    Urea Nitrogen 31.9 (H) 8.0 - 23.0 mg/dL    Creatinine 1.73 (H) 0.67 - 1.17 mg/dL    Calcium 9.8 8.8 - 10.2 mg/dL    Glucose 345 (H) 70 - 99 mg/dL    Alkaline Phosphatase 125 40 - 129 U/L    AST 35 10 - 50 U/L    ALT 38 10 - 50 U/L    Protein Total 8.0 6.4 - 8.3 g/dL    Albumin 4.2 3.5 - 5.2 g/dL    Bilirubin Total 0.4 <=1.2 mg/dL    GFR Estimate 42 (L) >60 mL/min/1.73m2   Troponin T, High Sensitivity   Result Value Ref Range    Troponin T, High Sensitivity 112 (HH) <=22 ng/L   CBC with platelets and differential   Result Value Ref Range    WBC Count 7.9 4.0 - 11.0 10e3/uL    RBC Count 5.94 (H) 4.40 - 5.90 10e6/uL    Hemoglobin 18.1 (H) 13.3 - 17.7 g/dL    Hematocrit 53.5 (H) 40.0 - 53.0 %    MCV 90 78 - 100 fL    MCH 30.5 26.5 - 33.0 pg    MCHC 33.8 31.5 - 36.5 g/dL    RDW 12.6 10.0 - 15.0 %    Platelet Count 174 150 - 450 10e3/uL    % Neutrophils 51 %    % Lymphocytes 35 %    % Monocytes 11 %    % Eosinophils 2 %    % Basophils 1 %    % Immature Granulocytes 0 %    NRBCs per 100 WBC 0 <1 /100    Absolute Neutrophils 4.2 1.6 - 8.3 10e3/uL    Absolute Lymphocytes 2.7 0.8 - 5.3 10e3/uL    Absolute Monocytes 0.8 0.0 - 1.3 10e3/uL    Absolute Eosinophils 0.2 0.0 - 0.7  10e3/uL    Absolute Basophils 0.0 0.0 - 0.2 10e3/uL    Absolute Immature Granulocytes 0.0 <=0.4 10e3/uL    Absolute NRBCs 0.0 10e3/uL   [place] Draw    Narrative    The following orders were created for panel order [place] Draw.  Procedure                               Abnormality         Status                     ---------                               -----------         ------                     Extra Blue Top Tube[[id number]]                              Final result               Extra Red Top Tube[[id number]]                               Final result                 Please view results for these tests on the individual orders.   Extra Blue Top Tube   Result Value Ref Range    Hold Specimen JIC    Extra Red Top Tube   Result Value Ref Range    Hold Specimen JIC    D dimer quantitative   Result Value Ref Range    D-Dimer Quantitative 0.48 0.00 - 0.50 ug/mL FEU    Narrative    This D-dimer assay is intended for use in conjunction with a clinical pretest probability assessment model to exclude pulmonary embolism (PE) and deep venous thrombosis (DVT) in outpatients suspected of PE or DVT. The cut-off value is 0.50 ug/mL FEU.   INR   Result Value Ref Range    INR 0.96 0.85 - 1.15   Chest XR,  PA & LAT    Narrative    XR CHEST 2 VIEWS   2/16/2023 3:17 PM     HISTORY: chest pain    COMPARISON: 8/6/2021      Impression    IMPRESSION: No acute cardiopulmonary disease.    DIMITRI RIOS MD         SYSTEM ID:  [id number]   CBC with platelets   Result Value Ref Range    WBC Count 9.6 4.0 - 11.0 10e3/uL    RBC Count 6.11 (H) 4.40 - 5.90 10e6/uL    Hemoglobin 18.2 (H) 13.3 - 17.7 g/dL    Hematocrit 54.4 (H) 40.0 - 53.0 %    MCV 89 78 - 100 fL    MCH 29.8 26.5 - 33.0 pg    MCHC 33.5 31.5 - 36.5 g/dL    RDW 12.6 10.0 - 15.0 %    Platelet Count 189 150 - 450 10e3/uL   Troponin T, High Sensitivity   Result Value Ref Range    Troponin T, High Sensitivity 188 (HH) <=22 ng/L   Troponin T, High Sensitivity   Result Value Ref Range     Troponin T, High Sensitivity 638 (HH) <=22 ng/L       Medications   nitroGLYcerin (NITROSTAT) sublingual tablet 0.4 mg ( Sublingual Canceled Entry 2/16/23 1452)   heparin ANTICOAGULANT loading dose for  LOW INTENSITY TREATMENT* Give BEFORE starting heparin infusion (has no administration in time range)   heparin 25,000 units in 0.45% NaCl 250 mL ANTICOAGULANT infusion (has no administration in time range)   aspirin (ASA) chewable tablet 324 mg (324 mg Oral Given 2/16/23 1451)       Assessments & Plan (with Medical Decision Making)     MDM: Jean Claude Sneed is a 67 year old male who presents with hypertension, prior tobacco abuse quit about 11 or 12 years ago.  He uses occasional alcohol.  Presents with 1 month of exertional chest pain episodes typically 5 minutes resolves with rest onset with exertion can walk to the mailbox but experiences chest pain on the way back.  Today he had a 30-minute episode arrives here with ST depression on EKG particularly V3 V4.  No current chest pain on arrival.  Arrives here with onset at around 1300 and pain resolved at 1330.  Consistent with NSTEMI.  Plan for initiation of heparin.  He was given aspirin.  Transfer to higher level of care.    There have been no beds available in the [facility].  I did speak to [facility] in [place].  They are considering the patient for transfer.     Systolic blood pressure 150/88 pulse of 88.  Single dose of metoprolol given.    The patient does note periodic suicidal ideation without planning or preparation.  This has been ongoing for some time.  We discussed that around the time of care of his heart he will markedly do better if we also manage depression and therefore I [facility] and [facility] health follow-up.  He tells me he will do this.  I had offered to order the consult myself here or to even involve a DEC who could arrange outpatient management but he tells me [facility] of [facility]  He also tells me that he would not hurt himself and has only been ideation again no planning or preparation.    He continues to have no chest pain.      I have reviewed the nursing notes.    I have reviewed the findings, diagnosis, plan and need for follow up with the patient.       Medical Decision Making  The patient's presentation is strongly suggestive of an undiagnosed new problem with uncertain diagnosis and an acute health issue posing potential threat to life or bodily function.    The patient's evaluation involved:  ordering and/or review of 1 test(s) in this encounter (see separate area of note for details)  review of 3+ test result(s) ordered prior to this encounter (see separate area of note for details)  discussion of management or test interpretation with another health professional (see separate area of note for details)    The patient's management involved prescription drug management (including medications given in the ED), a decision regarding emergency major surgery and a decision regarding hospitalization.        New Prescriptions    No medications on file       Final diagnoses:   NSTEMI (non-ST elevated myocardial infarction) (H)       2/16/2023   [facility] EMERGENCY DEPT     Yoni Mercado MD  02/16/23 2114       Yoni Mercado MD  02/16/23 2122

## 2023-02-17 ENCOUNTER — TRANSFERRED RECORDS (OUTPATIENT)
Dept: HEALTH INFORMATION MANAGEMENT | Facility: CLINIC | Age: 68
End: 2023-02-17

## 2023-02-17 NOTE — ED NOTES
Pt reports chest pain with sob that has been going on intermittently for about a month now. Pt reports would come on when exerting himself, pt reports pain to mid sternal region. Pt reports he had an episode at 1300 today, pt reports pain was worse than it had been. Pt reports a dull ache to mid sternum, rating pain 2/10, pt denies sob at this time.    Upon asking triage questions pt reports intermittent thoughts of suicide. Pt reports he is not currently taking any antidepressants and has not sought help for this in the past. Pt is tearful. Pt reports this has been going on for many years. Pt reports he does feel anxious from time to time. Pt does not have specific plan in place, pt has had fleeting thoughts. Pt states if he had the nerve to do it he would get in his car, get it up to 120 mph and drive it into a tree without a seat belt

## 2023-07-12 NOTE — ED PROVIDER NOTES
"  History     Chief Complaint   Patient presents with     Hypertension     Pt states his blood pressure has been going high and then he tries to get up and feels dizzy     Anxiety     HPI  Jean Claude Sneed is a 65 year old male who presents with concern about high blood pressure and feeling anxious and left shoulder discomforts.  On arrival patient reports he is better with episodes where he is felt sudden sense of feeling unwell with near fainting, and concerned that his blood pressure is elevated.  Patient reports he went grocery shopping just prior to presenting to the department and that he typically is very hurried when he goes for shopping \"I am a joan who likes to get things done\".  Patient reports he suddenly felt like he was feeling lightheaded check to the grocery store drove home to [facility] and checked his blood pressure and his blood pressure was about 150/90.  He reports he got concerned because he said increasing frequency of these episodes of last 3 weeks although he had symptoms for about 8 months.  He reports he was seen in clinic and reviewed these symptoms with his primary care provider who recommended close monitoring with outpatient follow-up.  He was concerned that the frequency of symptoms appears to be worsening and presents for further evaluation and care.  Patient reports he quit smoking 7 years ago.  No personal history of heart disease and no known family history of arrhythmias or sudden cardiac death.  Patient reports he is able to walk up 2 flights of stairs at home at least 4-5 times a day without having to stop, and he has not had any episodes of chest pain or tightness.  He also has not had any prodrome specifically palpitation.      Patient has a medical diagnosis of prediabetes, stage II chronic kidney disease, hyperlipidemia and hypertension.  Patient is currently prescribed albuterol, allopurinol 100 mg daily, atorvastatin 20 mg daily, hydrochlorothiazide, lisinopril, " "omeprazole, 81 mg aspirin Flonase and Tylenol    Allergies:  Allergies   Allergen Reactions     Beta Adrenergic Blockers Other (See Comments)     Hands arms and feet cold      Gadavist [Gadobutrol] Hives and Itching     Patient developed hives on forehead and anterior abdomen with itchiness of eyes and face within 30 seconds of Gadavist contrast injection. Symptoms started feeling better within about 15 minutes of injection.         Gadavist - 10 mL - Lot #: [id number] - NDC 61678-560-65     Azelastine Other (See Comments) and Dizziness     \"shaky\" and sick to stomach     Ciprofloxacin Other (See Comments) and Dizziness     and sick to stomach     Diltiazem Other (See Comments)     Blood pressure up and pulse up then down      Ibuprofen Other (See Comments)     Hypertension     Naproxen Other (See Comments)     Feels sick, like coming down with the stomach flu     Citalopram Anxiety and Other (See Comments)     Agitation, Side effects started in less than an hour after first pill       Problem List:    Patient Active Problem List    Diagnosis Date Noted     Hyperlipidemia LDL goal <130 03/19/2020     Priority: Medium     Chronic kidney disease, stage 2 (mild) 03/19/2020     Priority: Medium     Prediabetes 03/19/2020     Priority: Medium     Essential hypertension, benign 07/26/2012     Priority: Medium     Tobacco abuse 07/26/2012     Priority: Medium     Quit in 2014!!!          Past Medical History:    Past Medical History:   Diagnosis Date     HTN (hypertension)        Past Surgical History:    Past Surgical History:   Procedure Laterality Date     left knee surgery  1996       Family History:    Family History   Problem Relation Age of Onset     Hypertension Mother      Hypertension Father      Hypertension Brother      Hypertension Brother      Hypertension Brother      Hypertension Brother      Cancer Brother         prostate     Hypertension Brother      Hypertension Brother      Diabetes Brother      Cancer " "Brother      Hypertension Brother      Cancer Brother         prostate       Social History:  Marital Status:   [5]  Social History     Tobacco Use     Smoking status: Former Smoker     Packs/day: 0.50     Years: 30.00     Pack years: 15.00     Types: Cigarettes     Quit date: 2014     Years since quittin.0     Smokeless tobacco: Never Used   Substance Use Topics     Alcohol use: Yes     Comment: occassional wine     Drug use: Not Currently        Medications:         acetaminophen (TYLENOL) 500 MG tablet       albuterol (PROAIR HFA/PROVENTIL HFA/VENTOLIN HFA) 108 (90 Base) MCG/ACT inhaler       allopurinol (ZYLOPRIM) 100 MG tablet       aspirin 81 MG EC tablet       atorvastatin (LIPITOR) 20 MG tablet       fluticasone (FLONASE) 50 MCG/ACT nasal spray       hydrochlorothiazide (HYDRODIURIL) 25 MG tablet       lisinopril (ZESTRIL) 20 MG tablet       omeprazole (PRILOSEC) 40 MG DR capsule          Review of Systems   Constitutional: Negative.    HENT: Negative.    Eyes: Negative.    Respiratory: Negative.    Cardiovascular: Negative.    Gastrointestinal: Negative.    Endocrine: Negative.    Genitourinary: Negative.    Musculoskeletal: Negative.    Skin: Negative.    Allergic/Immunologic: Negative.    Neurological: Positive for light-headedness.   Hematological: Negative.    Psychiatric/Behavioral: Negative.    All other systems reviewed and are negative.      Physical Exam   BP: (!) 136/96  Pulse: 119  Temp: 97.7  F (36.5  C)  Resp: 16  Height: 177.8 cm (5' 10\")  Weight: 111.1 kg (245 lb)  SpO2: 95 %  Lying Orthostatic BP: 141/80  Lying Orthostatic Pulse: 97 bpm  Sitting Orthostatic BP: 142/84  Sitting Orthostatic Pulse: 106 bpm  Standing Orthostatic BP: 147/87  Standing Orthostatic Pulse: 110 bpm      Physical Exam  Constitutional:       General: He is not in acute distress.     Appearance: Normal appearance. He is not ill-appearing, toxic-appearing or diaphoretic.   HENT:      Head: Normocephalic and " atraumatic.      Nose: Nose normal. No congestion or rhinorrhea.   Eyes:      General: No scleral icterus.        Right eye: No discharge.         Left eye: No discharge.      Extraocular Movements: Extraocular movements intact.      Pupils: Pupils are equal, round, and reactive to light.   Neck:      Musculoskeletal: Normal range of motion and neck supple. No neck rigidity or muscular tenderness.      Vascular: No carotid bruit.   Cardiovascular:      Rate and Rhythm: Regular rhythm. Tachycardia present.   Pulmonary:      Effort: Pulmonary effort is normal. No respiratory distress.      Breath sounds: Normal breath sounds. No stridor. No wheezing, rhonchi or rales.   Chest:      Chest wall: No tenderness.   Musculoskeletal:         General: No swelling, tenderness, deformity or signs of injury.      Right lower leg: No edema.      Left lower leg: No edema.   Lymphadenopathy:      Cervical: No cervical adenopathy.   Skin:     Capillary Refill: Capillary refill takes less than 2 seconds.      Coloration: Skin is not jaundiced or pale.      Findings: No bruising, erythema, lesion or rash.   Neurological:      General: No focal deficit present.      Mental Status: He is alert and oriented to person, place, and time.      Cranial Nerves: No cranial nerve deficit.      Sensory: No sensory deficit.      Motor: No weakness.      Coordination: Coordination normal.      Gait: Gait normal.      Deep Tendon Reflexes: Reflexes normal.   Psychiatric:         Mood and Affect: Mood normal.         Behavior: Behavior normal.         Thought Content: Thought content normal.         Judgment: Judgment normal.       [employer] Stroke Scale  Exam Interval: Baseline   Score    Level of consciousness: (0)   Alert, keenly responsive    LOC questions: (0)   Answers both questions correctly    LOC commands: (0)   Performs both tasks correctly    Best gaze: (0)   Normal    Visual: (0)   No visual loss    Facial palsy: (0)    Normal symmetrical movements    Motor arm (left): (0)   No drift    Motor arm (right): (0)   No drift    Motor leg (left): (0)   No drift    Motor leg (right): (0)   No drift    Limb ataxia: (0)   Absent    Sensory: (0)   Normal- no sensory loss    Best language: (0)   Normal- no aphasia    Dysarthria: (0)   Normal    Extinction and inattention: (0)   No abnormality        Total Score:  0     ED Course        Procedures               EKG Interpretation:      Interpreted by Bayron Cummings MD  Time reviewed: 01/12/21  Symptoms at time of EKG: None   Rhythm: sinus tachycardia  Rate: Tachycardia  Axis: Normal  Ectopy: none  Conduction: normal  ST Segments/ T Waves: Non-specific ST-T wave changes  Q Waves: nonspecific  Comparison to prior: When compared with EKG dated October 7, 2013 sinus tachycardia is present with rate related changes but no acute ischemia    Clinical Impression: Sinus tachycardia.      Critical Care time:  none               ED medications:   Medications   gadobutrol (GADAVIST) injection 10 mL (10 mLs Intravenous Given 1/12/21 1637)   diphenhydrAMINE (BENADRYL) injection 12.5 mg (12.5 mg Intravenous Given 1/12/21 1732)   methylPREDNISolone sodium succinate (solu-MEDROL) injection 125 mg (125 mg Intravenous Given 1/12/21 1733)       ED Vitals:  Vitals:    01/12/21 1545 01/12/21 1730 01/12/21 1745 01/12/21 1800   BP: (!) 158/85 (!) 157/96 (!) 146/97 (!) 157/90   Pulse: 104 95 102 94   Resp: 28      Temp:       TempSrc:       SpO2: 96% 95% 96% 96%   Weight:       Height:         Vitals:    01/12/21 1545 01/12/21 1730 01/12/21 1745 01/12/21 1800   BP: (!) 158/85 (!) 157/96 (!) 146/97 (!) 157/90   Pulse: 104 95 102 94   Resp: 28      Temp:       TempSrc:       SpO2: 96% 95% 96% 96%   Weight:       Height:           ED labs and Imaging:  Results for orders placed or performed during the hospital encounter of 01/12/21   MR Brain w/o & w Contrast     Status: None    Narrative    MRI BRAIN WITHOUT  AND WITH CONTRAST  1/12/2021 5:16 PM    HISTORY: Episodes of incoordination-over the last 8 months with  worsening of the last 3 weeks. History of hypertension, prediabetes,  and lightheadedness. Evaluate for acute abnormality.     TECHNIQUE:  Multiplanar, multisequence MRI of the brain without and  with 10mL Gadavist.    COMPARISON: None.    FINDINGS:  Mild volume loss is present. A few subtle white matter T2  hyperintensities are present which likely represent mild chronic small  vessel ischemic change considered to be within normal limits for age.  No evidence of recent ischemia, hemorrhage, mass, mass effect, or  hydrocephalus. No abnormal enhancement or diffusion restriction.    The visualized calvarium is unremarkable. Trace paranasal sinus  mucosal thickening. Partial opacification of the bilateral mastoid  cavities, presumably inflammatory.      Impression    IMPRESSION:    1. No acute abnormality.  2. Mild volume loss and a few white matter T2 hyperintensities which  likely represent mild chronic small vessel ischemic change considered  to be within normal limits for age.  3. Opacification bilateral mastoid cavities, presumably  infectious/inflammatory.    GORAN NORMAN MD   XR Eye Foreign Body     Status: None    Narrative    EYE FOREIGN BODY   1/12/2021 4:47 PM     HISTORY: Retained (old) foreign body following penetrating wound of  orbit. Encounter for imaging to screen for metal prior to magnetic  resonance imaging (MRI).    COMPARISON: None.      Impression    IMPRESSION: No radiopaque foreign body is identified within the right  orbit or the left orbit.    GORAN NORMAN MD   CBC with platelets differential     Status: None   Result Value Ref Range    WBC 6.6 4.0 - 11.0 10e9/L    RBC Count 5.37 4.4 - 5.9 10e12/L    Hemoglobin 16.3 13.3 - 17.7 g/dL    Hematocrit 48.2 40.0 - 53.0 %    MCV 90 78 - 100 fl    MCH 30.4 26.5 - 33.0 pg    MCHC 33.8 31.5 - 36.5 g/dL    RDW 12.3 10.0 - 15.0 %    Platelet  Count 189 150 - 450 10e9/L    Diff Method Automated Method     % Neutrophils 55.1 %    % Lymphocytes 31.1 %    % Monocytes 11.3 %    % Eosinophils 1.4 %    % Basophils 0.8 %    % Immature Granulocytes 0.3 %    Nucleated RBCs 0 0 /100    Absolute Neutrophil 3.6 1.6 - 8.3 10e9/L    Absolute Lymphocytes 2.0 0.8 - 5.3 10e9/L    Absolute Monocytes 0.7 0.0 - 1.3 10e9/L    Absolute Eosinophils 0.1 0.0 - 0.7 10e9/L    Absolute Basophils 0.1 0.0 - 0.2 10e9/L    Abs Immature Granulocytes 0.0 0 - 0.4 10e9/L    Absolute Nucleated RBC 0.0    Basic metabolic panel     Status: Abnormal   Result Value Ref Range    Sodium 137 133 - 144 mmol/L    Potassium 4.0 3.4 - 5.3 mmol/L    Chloride 105 94 - 109 mmol/L    Carbon Dioxide 28 20 - 32 mmol/L    Anion Gap 4 3 - 14 mmol/L    Glucose 209 (H) 70 - 99 mg/dL    Urea Nitrogen 30 7 - 30 mg/dL    Creatinine 1.71 (H) 0.66 - 1.25 mg/dL    GFR Estimate 40 (L) >60 mL/min/[1.73_m2]    GFR Estimate If Black 47 (L) >60 mL/min/[1.73_m2]    Calcium 9.3 8.5 - 10.1 mg/dL   Troponin I     Status: None   Result Value Ref Range    Troponin I ES <0.015 0.000 - 0.045 ug/L       Assessments & Plan (with Medical Decision Making)   Assessment Summary and Clinical Impression: 65-year-old male who presented  with chronic symptoms with a sensation of lightheadedness, poor coordination over the last 8 months, however feeling that frequency of symptoms had been increasing over the last 3 weeks.  He was concerned about an episode that occurred while grocery shopping just prior to arrival.  The cause of his symptoms as described is not clear.  His symptoms were not suggestive of a paroxysmal arrhythmia acute vestibular syndrome or central cause, or stroke.  His work-up was negative including neuroimaging, and his symptoms were not able to be reproduced during his ED course.  Patient was mostly concerned about elevated blood pressure readings from baseline.  He had no focal neurologic deficits on examination, NIH  stroke of 0.  He was reassured by his evaluation and was discharged home plan to follow-up with his clinic provider for recheck on symptoms and consideration for additional provocative cardiovascular testing.  He unfortunately had some hives about his forehead and abdomen after administration for MRI imaging that resolved with IV Solu-Medrol and Benadryl.      ED Course and Plan:  Reviewed the medical record.  EKG on arrival when compared EKG from October 2013 revealed no acute change  We reviewed possible causes for his symptoms of feeling lightheaded suddenly with concern about elevated blood pressure readings and symptom improving with rest.  A broad differential was considered including stroke mimics.  With chronicity of symptoms my suspicion that he was presenting with an acute stroke was low, however given vague complaints of incoordination lightheadedness neuroimaging was obtained.  Patient was not orthostatic. Unable to recreate his symptoms with walking around the department and he was otherwise asymptomatic in discussion with the care team. Work revealed glucose- 209.  Creatinine was 1.71 (improved from March, 3  2020) troponin was negative. RI imaging of the brain was obtained to exclude any subacute / chroinc infarct. After MRI imaging patient was noted to have some urticaria about the forehead 2 lesions and urticaria underneath the left breast and upper abdomen.  He also had some conjunctival irritationno hoarseness, no stridor no shortness of breath no facial swelling.  He was treated with IV diphenhydramine and Solu-Medrol and observed.   MRI imaging revealed no acute abnormality with mild volume loss and chronic small vessel ischemic changes considered to be within normal for age.  See the radiology interpretation for details. Patient was observed for a longer period of time with hives after contrast for MRI imaging to ensure he was not develop any sequela that be concerning for a more serious  reaction.  Patient inquired if his symptoms could be due to anxiety.  I informed the patient of this is likely diagnosis of exclusion but could be possible that if he would like to trial therapy for anxiety he could follow-up with his clinic provider.  We discussed that he could consider cardiovascular testing such as a stress test to see if the symptoms can be triggered.  Patient expressed comfort going home agreement and understanding of plan of care.        Disclaimer: This note consists of symbols derived from keyboarding, dictation and/or voice recognition software. As a result, there may be errors in the script that have gone undetected. Please consider this when interpreting information found in this chart.  I have reviewed the nursing notes.    I have reviewed the findings, diagnosis, plan and need for follow up with the patient.       Discharge Medication List as of 1/12/2021  6:18 PM          Final diagnoses:   Light-headed feeling - Intermittently for 8 months now more pronounced in the last 3 weeks   Hives - Post contrast administration for MRI   History of hypertension       1/12/2021   [facility] EMERGENCY DEPT     Bayron Cummings MD  01/12/21 7542     This document is complete and the patient is ready for discharge.

## 2023-08-02 ENCOUNTER — TRANSCRIBE ORDERS (OUTPATIENT)
Dept: OTHER | Age: 68
End: 2023-08-02

## 2023-08-02 ENCOUNTER — TELEPHONE (OUTPATIENT)
Dept: OTOLARYNGOLOGY | Facility: CLINIC | Age: 68
End: 2023-08-02

## 2023-08-02 DIAGNOSIS — J38.3 OTHER DISEASES OF VOCAL CORDS: Primary | ICD-10-CM

## 2023-08-02 NOTE — TELEPHONE ENCOUNTER
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     Sending to clinic due to concerns for mass, [facility]  Action Taken: Other: ENT    Travel Screening: Not Applicable

## 2023-08-02 NOTE — TELEPHONE ENCOUNTER
FUTURE VISIT INFORMATION      FUTURE VISIT INFORMATION:  Date: 8/10/23  Time: 2PM  Location: [facility]  REFERRAL INFORMATION:  Referring provider:  Kaia Hwang MD   Referring providers clinic:  [facility]   Reason for visit/diagnosis  REf by Kaia Hwang MD for Other diseases of vocal cords     RECORDS REQUESTED FROM:       Clinic name Comments Records Status Imaging Status   [facility]   9/27/22- OV Kaia Hwang MD  SCANNED IN EPIC     IMAGING  2/16/23- XR CHEST   8/6/21- XR CHEST   10/4/13- XR CHEST  EPIC  PACS    [facility]  11/29/14- XR CHEST   10/2/13- CT SINUS   9/17/13- XR CHEST  [facility]  8/2/23- PENDING REQ   In pacs     August 2, 2023 4:33 PM - Faxed a request to [facility] to push Image to [place] PACS- Nancy   August 4, 2023 at 6:47 AM - images resolved in pacs -Brenna

## 2023-08-08 ENCOUNTER — TELEPHONE (OUTPATIENT)
Dept: OTOLARYNGOLOGY | Facility: CLINIC | Age: 68
End: 2023-08-08

## 2023-08-10 ENCOUNTER — PRE VISIT (OUTPATIENT)
Dept: OTOLARYNGOLOGY | Facility: CLINIC | Age: 68
End: 2023-08-10

## 2023-11-18 ENCOUNTER — TRANSFERRED RECORDS (OUTPATIENT)
Dept: HEALTH INFORMATION MANAGEMENT | Facility: CLINIC | Age: 68
End: 2023-11-18

## 2023-11-28 ENCOUNTER — TRANSFERRED RECORDS (OUTPATIENT)
Dept: HEALTH INFORMATION MANAGEMENT | Facility: CLINIC | Age: 68
End: 2023-11-28

## 2023-12-15 ENCOUNTER — TRANSCRIBE ORDERS (OUTPATIENT)
Dept: OTHER | Age: 68
End: 2023-12-15

## 2023-12-15 ENCOUNTER — PATIENT OUTREACH (OUTPATIENT)
Dept: ONCOLOGY | Facility: CLINIC | Age: 68
End: 2023-12-15

## 2023-12-15 DIAGNOSIS — D49.9 PRECANCEROUS LESION: Primary | ICD-10-CM

## 2023-12-15 NOTE — PROGRESS NOTES
New Patient Oncology Nurse Navigator Note     Referring provider: Called in per Patient      Referring Clinic/Organization: [facility] number [id number]      Referred to (specialty:) Radiation Oncology     Requested provider (if applicable): SUNNY -requesting [place]     Date Referral Received: December 15, 2023     Evaluation for:  D49.9 (ICD-10-CM) - Precancerous lesion   Precancer on vocal chords      Clinical History (per Nurse review of records provided):      Final Diagnosis    A. RIGHT VOCAL CORD, EXCISION (FS)  --SUPERFICIALLY INVASIVE, WELL-DIFFERENTIATED SQUAMOUS CELL CARCINOMA ARISING IN A BACKGROUND OF SEVERE KERATINIZING DYSPLASIA  --DEPTH OF INVASION: 0.5 MM  --MARGINS:  --INVASIVE CARCINOMA: POSITIVE DEEP MARGIN ON MAIN SPECIMEN  --SEVERE KERATINIZING DYSPLASIA/CARCINOMA IN SITU: POSITIVE ON FINAL DEEP MARGINS (SEE PARTS G AND H)     B. RIGHT VOCAL CORD, ANTERIOR 1/3, EXCISION (FS)  --SQUAMOUS MUCOSA WITH MILD KERATINIZING DYSPLASIA  --NEGATIVE FOR IN SITU OR INVASIVE CARCINOMA     C. LEFT VOCAL CORD, EXCISION (FS)  --SQUAMOUS AND RESPIRATORY MUCOSA WITH MILD KERATINIZING DYSPLASIA  --NEGATIVE FOR IN SITU OR INVASIVE CARCINOMA     D. RIGHT VOCAL CORD, POSTERIOR INFERIOR MARGIN, EXCISION (FS)  --BENIGN SQUAMOUS MUCOSA  --NEGATIVE FOR DYSPLASIA OR CARCINOMA     E. RIGHT VOCAL CORD, LATERAL MARGIN, EXCISION (FS)  --SQUAMOUS AND RESPIRATORY MUCOSA WITH MILD KERATINIZING DYSPLASIA  --NEGATIVE FOR IN SITU OR INVASIVE CARCINOMA      F. RIGHT VOCAL CORD, INFERIOR MARGIN, EXCISION (FS)  --SQUAMOUS MUCOSA WITH MILD KERATINIZING DYSPLASIA  --NEGATIVE FOR IN SITU OR INVASIVE CARCINOMA     G. RIGHT VOCAL CORD, DEEP ANTERIOR, MARGIN, EXCISION (FS)  --SQUAMOUS MUCOSA WITH SEVERE KERATINIZING DYSPLASIA (SQUAMOUS CELL CARCINOMA IN SITU)     H. RIGHT VOCAL CORD, DEEP POSTERIOR MARGIN, EXCISION (FS)  --SQUAMOUS MUCOSA WITH SEVERE KERATINIZING DYSPLASIA (SQUAMOUS CELL CARCINOMA IN SITU)     [id number]   Electronically  signed by Kim Nicole MD on 10/4/2023 at 12:35 PM      Amendment   Although high grade dysplasia is present in almost all tissue biopsies, following review of the permanent sections, the appearance of severe dysplasia described at the time of frozen section in parts B and C is considered more likely to represent moderate (high grade) dysplasia with superimposed tangential sectioning artifact and frozen artifact. The purpose of this amendment is to provide interpretive clarification; the final diagnoses are all unchanged.     Final Diagnosis   A. LEFT VOCAL CORD LESION, BIOPSY (FS)  --SQUAMOUS MUCOSA WITH KERATOSIS AND HIGH GRADE (MODERATE) SQUAMOUS DYSPLASIA  --NO DEFINITIVE INVASION IDENTIFIED (LEVELS EXAMINED)     B. RIGHT MID AND POSTERIOR VOCAL CORD LESION, RE-EXCISION (FS)  --SQUAMOUS MUCOSA WITH KERATOSIS AND HIGH GRADE (MODERATE) SQUAMOUS DYSPLASIA   --NO DEFINITIVE INVASION IDENTIFIED (LEVELS EXAMINED), SEE COMMENT     C. LEFT VOCAL CORD, LEFT LATERAL MARGIN, EXCISION (FS)  --SQUAMOUS MUCOSA WITH FOCAL HIGH GRADE (MODERATE) DYSPLASIA INVOLVING THE CAUTERIZED TISSUE EDGE  --RESPIRATORY MUCOSA WITH REACTIVE CHANGES ALSO PRESENT     D. LEFT VOCAL CORD, ANTERIOR MARGIN, EXCISION (FS)  --FOCAL DETACHED HIGH GRADE SQUAMOUS DYSPLASIA WITH CAUTERY ARTIFACT     E. LEFT VOCAL CORD, POSTERIOR MARGIN, EXCISION (FS)  --SQUAMOUS MUCOSA WITH KERATOSIS AND MILD TO MODERATE (HIGH GRADE) DYSPLASIA     F. LEFT VOCAL CORD, FREE EDGE INFERIOR MARGIN, EXCISION (FS)  --SQUAMOUS MUCOSA WITH KERATOSIS AND MILD DYSPLASIA  --TANGENTIAL SECTIONING ARTIFACT ALSO PRESENT     G. LEFT VOCAL CORD, INFERIOR FREE EDGE #2, EXCISION  -- SQUAMOUS MUCOSA WITH MILD ATYPIA, FAVOR REACTIVE CHANGES     H. LEFT VOCAL CORD, POSTERIOR #2, EXCISION   --SQUAMOUS MUCOSA WITH KERATOSIS AND MILD TO MODERATE (HIGH GRADE) DYSPLASIA  Amendment electronically signed by Schouweiler, Katie E, MD on 12/11/2023 at 12:54 PM Electronically signed by  "Schouweiler, Katie E, MD on 12/11/2023 at 12:39 PMComment   This case (permanent tissue and frozen sections) was seen in consultation with [facility] (Dr. Garland Max M.D.) and the diagnosis reflects the consultant's interpretation.     The biopsies from the larynx show squamous mucosa with various degrees of atypia and dysplasia as listed above (parts A-H).  A rare detached squamous nest is identified in part B and is favored to represent an artifact of tangential sectioning. However, clinical correlation is recommended. If there is a worrisome lesion or mass present, then re-biopsy may be indicated for further evaluation.      The consulation report is available through the hyperlink at the end of this report.     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 The specimen in a container labeled \"left vocal cord lesion\" consists of a 0.6 x 0.5 x 0.2 cm gray-white focally cauterized soft tissue fragment. The specimen is entirely frozen and the frozen section remnant is submitted in A1.    B. The specimen in a container labeled \"re-excision right mid and pos*\" consists of a 0.6 x 0.5 x 0.2 cm pink-red focally cauterized soft tissue fragment. The specimen is entirely frozen and the frozen section remnant is submitted in B1.   C. The specimen in a container labeled \"left vocal cord left lateral m*\" consists of a 0.4 x 0.3 x 0.2 cm pink-red soft tissue fragment. The specimen is frozen and the frozen section remnant is submitted in C1.    D. The specimen in a container labeled \"left vocal cord anterior margin\" consists of a 0.4 x 0.3 x 0.2 cm pink-red soft tissue fragment. The specimen is entirely frozen and the frozen section remnant is submitted in D1.    E. The specimen in a container labeled \"left vocal cord posterior margin\" consists of a 0.3 x 0.3 x 0.2 cm pink-red soft tissue fragment. The specimen is entirely frozen and the frozen " "section remnant is submitted in E1.    F. The specimen in a container labeled \"left vocal cord free edge inferior\" consists of a 0.3 x 0.3 x 0.2 cm pink-red soft tissue fragment. The specimen is entirely frozen and the frozen section remnant is submitted in F1.    G. The specimen in a container labeled \"left vocal cord inferior free*\" consists of a 0.5 x 0.2 x 0.2 cm pink-red soft tissue fragment. The specimen is entirely submitted in G1.    H. The specimen in a container labeled \"left vocal cord posterior #2\" consists of a 0.4 x 0.3 x 0.2 cm gray-red soft tissue fragment. The specimen is entirely submitted in H1. (KS)    Intraoperative Consultation   A. FROZEN SECTION DIAGNOSIS - LEFT VOCAL CORD LESION  --ATYPICAL SQUAMOUS PROLIFERATION WITH AT LEAST HIGH GRADE DYSPLASIA, CANNOT EXCLUDE FOCAL INVASION  --Seen with Dr. Kelley     B. FROZEN SECTION DIAGNOSIS - RE-EXCISION RIGHT MID AND POSTERIOR VOCAL CORD LESION  --HIGH GRADE DYSPLASIA (SEVERE), CANNOT EXCLUDE FOCAL SUPERFICIAL INVASION (FROZEN ARTIFACT/TISSUE DISRUPTION)  --Seen with Dr. Kelley     C. FROZEN SECTION DIAGNOSIS - LEFT VOCAL CORD LEFT LATERAL MARGIN  --FOCAL HIGH GRADE DYSPLASIA (SEVERE) INVOLVING CAUTERIZED SQUAMOUS TISSUE EDGE  --Seen with Dr. Kelley  D. FROZEN SECTION DIAGNOSIS - LEFT VOCAL CORD ANTERIOR MARGIN  --FOCAL HIGH GRADE SQUAMOUS DYSPLASIA  --Seen with Dr. eKlley     E. FROZEN SECTION DIAGNOSIS - LEFT VOCAL CORD POSTERIOR MARGIN  --HIGH GRADE SQUAMOUS DYSPLASIA  --Seen with Dr. Kelley     F. FROZEN SECTION DIAGNOSIS - LEFT VOCAL CORD FREE EDGE INFERIOR MARGIN  --HIGH GRADE SQUAMOUS DYSPLASIA  --Seen with Dr. Kelley     Microscopic Description   Microscopic examination is performed and the findings support the final diagnosis.       Resulting [facility]    Records Location: Care Everywhere, Media, and See Bookmarked material     Records Needed: NA     Additional testing needed prior to consult: " NA    December 18, 2023    Called patient to introduced myself and role as nurse navigator with [facility] Hematology/Oncology department and to inform them that we have received the referral for a diagnosis of Precancerous lesion on vocal chords from Dr. Juan. Patient confirms they are aware of the referral and ready to schedule.     [facility] found to have an infection and was sent to ENT who discovered the pre-cancerous cells and recommended radiation.     Provided them with contact information for NPS and encourage them to call with any questions. Patient verbalized understanding of plan. Transferred to NPS to schedule.     Julianna Colon, RN, BSN  [facility] Hematology/Oncology Nurse Navigator  [phone]

## 2023-12-21 ENCOUNTER — OFFICE VISIT (OUTPATIENT)
Dept: RADIATION THERAPY | Facility: OUTPATIENT CENTER | Age: 68
End: 2023-12-21

## 2023-12-21 ENCOUNTER — PRE VISIT (OUTPATIENT)
Dept: RADIATION THERAPY | Facility: OUTPATIENT CENTER | Age: 68
End: 2023-12-21

## 2023-12-21 VITALS
SYSTOLIC BLOOD PRESSURE: 160 MMHG | RESPIRATION RATE: 18 BRPM | WEIGHT: 240 LBS | BODY MASS INDEX: 34.44 KG/M2 | OXYGEN SATURATION: 95 % | HEART RATE: 72 BPM | DIASTOLIC BLOOD PRESSURE: 93 MMHG

## 2023-12-21 DIAGNOSIS — C32.0 VOCAL CORD CANCER (H): Primary | ICD-10-CM

## 2023-12-21 PROBLEM — E11.9 DIABETES MELLITUS, TYPE 2 (H): Status: ACTIVE | Noted: 2023-12-21

## 2023-12-21 RX ORDER — CETIRIZINE HYDROCHLORIDE 10 MG/1
10 TABLET ORAL AT BEDTIME
COMMUNITY
Start: 2023-08-01

## 2023-12-21 RX ORDER — NITROGLYCERIN 0.4 MG/1
0.4 TABLET SUBLINGUAL EVERY 5 MIN PRN
COMMUNITY
Start: 2023-02-27

## 2023-12-21 RX ORDER — METOPROLOL TARTRATE 50 MG
1 TABLET ORAL 2 TIMES DAILY
COMMUNITY
Start: 2023-05-15

## 2023-12-21 RX ORDER — ALLOPURINOL 100 MG/1
200 TABLET ORAL DAILY
COMMUNITY
Start: 2023-08-31

## 2023-12-21 RX ORDER — CLOPIDOGREL BISULFATE 75 MG/1
1 TABLET ORAL DAILY
COMMUNITY
Start: 2023-05-17 | End: 2024-04-15

## 2023-12-21 ASSESSMENT — PAIN SCALES - GENERAL: PAINLEVEL: NO PAIN (0)

## 2023-12-21 NOTE — NURSING NOTE
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 9/29/23   R bx/excision and L bx  and 11/21/23 R re-excision and L bx  [facility] Dr. Flora Juan  Chemotherapy ? Not indicated   Other Treatments for this Cancer ? Discussion today about radiation therapy    PERSONAL HISTORY OF CANCER   Previous Cancer ? no   Prior Radiation ? no   Prior Chemotherapy ? no   Prior Hormonal Therapy ? no     RECENT IMAGING STUDIES  None - will need CT scan    REFERRALS NEEDED  None at this time    VITALS  BP (!) 160/93   Pulse 72   Resp 18   Wt 108.9 kg (240 lb)   SpO2 95%   BMI 34.44 kg/m      PACEMAKER/IMPLANTED CARDIAC DEVICE no    PAIN  Denies    PSYCHOSOCIAL  Marital Status: single  Patient lives in [place] with brother and roommate.  Number of children: has adult children and grandchildren  Working status:  retired  Do you feel safe in your home? Yes    REVIEW OF SYSTEMS  Skin: negative  Eyes: glasses  Ears/Nose/Throat: hoarseness since initial surgery 9/2023.  Prior to that no hoarseness, no dysphagia, no symptoms.  Edentulous.  [place]/hearing aids  Respiratory: Dyspnea on exertion, No shortness of breath, and No hemoptysis  Cardiovascular: CABG 2/2023, no current symptoms  Gastrointestinal: negative  Genitourinary: negative  Musculoskeletal: arthritis  Neurologic: negative  Psychiatric: negative  Hematologic/Lymphatic/Immunologic: negative  Endocrine: diabetes    Radiation Oncology Patient Teaching    Current Concern: \"what would radiation be like\" \"I've had a rough year with medical problems - CABG in FEbruary and then I didn't have any symptoms but they found the changes on my vocal cords when I needed new hearing aids\"    Person involved with teaching: Patient  Patient asked Questions: " Yes  Patient was cooperative: Yes  Patient was receptive (willing to accept information given): Yes    Education Assessment  Comprehension ability: Medium  Knowledge level: Medium  Factors affecting teaching: None    Education Materials Given  Radiation Therapy and You  Radiation Therapy for Head and Neck  Mouth Care for Radiation Therapy    Educational Topics Discussed  Side effects, Medications, Activity, Nutrition, Adjustment to illness, and When to call MD/RN    Response To Teaching  More review necessary    Do you have an advanced directive or living will? Yes  Are you DNR/DNI? No

## 2023-12-21 NOTE — LETTER
2023         RE: Jean Claude Sneed  [address] [facility] [address]        Dear Colleague,    Thank you for referring your patient, Jean Claude Sneed, to the [facility] RADIATION THERAPY CLINIC. Please see a copy of my visit note below.       Department of Radiation Oncology  Radiation Therapy Center  [facility]  [address], [address]  [place] [address]  [phone]       Consultation Note    Name: Jean Claude Sneed MRN: [id number]   : 1955   Date of Service: 2023  Referring: Dr. 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  Evaluate potential role for radiation therapy.    History of Present Illness   Mr. 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  Evaluate potential role for radiation therapy.    The patient initially presented after referral from audiology department for evaluation of mixed hearing loss.  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  The patient denies any hoarseness at the time.  Cords were noted to be mobile.  On 2023 the patient underwent direct laryngoscopy with excisional biopsy of the right vocal cord lesion.  Findings intraoperatively demonstrated plaque-like lesion along the right true vocal cord and less prominent leukoplakia on the left side.  Right true vocal cord lesion was excised.  Pathology demonstrated 7 mm squamous cell carcinoma, well-differentiated, positive margin.  Evaluation of the left vocal cord region did not demonstrate cancer, but did demonstrate mild dysplasia.  The  patient subsequently underwent microlaryngoscopy on 11/21/2023 by Dr. Juan.  Findings intraoperatively demonstrated an exophytic lesion along the left anterior cord that is changed compared to prior evaluation.  No obvious lesion is noted on the right side.  There was noted a small area of exophytic tissue anteriorly on the right side.  However Dr. Carias elected to focus, and left side which had changed since prior diagnosis.  The left side there was noted to be an exophytic lesion along the left anterior cord.  Left vocal cord lesions were obtained which demonstrated high-grade squamous dysplasia without clear evidence of invasive disease.  Reexcision of the right vocal cord demonstrated high-grade squamous cell dysplasia.  Multiple areas were sampled and appears to be that there were presence of at least severe dysplasia although the left side and possibility of skip lesions.  The patient was subsequently referred to our clinic to discuss potential role of radiation therapy.    Today the patient notes he is overall doing fairly well.  There is no pruritus after his 2 prior direct microlaryngoscopies.  No prior radiation.  No pacemaker.  Prior smoker, quit 14 years ago.    Past Medical History:   Past Medical History:   Diagnosis Date     HTN (hypertension)        Past Surgical History:   Past Surgical History:   Procedure Laterality Date     left knee surgery  1996       Chemotherapy History:  None    Radiation History:  None    Pregnant: Not Applicable  Implanted Cardiac Devices: No    Medications:  Current Outpatient Medications   Medication     acetaminophen (TYLENOL) 500 MG tablet     albuterol (PROAIR HFA/PROVENTIL HFA/VENTOLIN HFA) 108 (90 Base) MCG/ACT inhaler     aspirin 81 MG EC tablet     atorvastatin (LIPITOR) 20 MG tablet     fluticasone (FLONASE) 50 MCG/ACT nasal spray     omeprazole (PRILOSEC) 40 MG DR capsule     phenylephrine-mineral oil-petrolatum (PREPARATION H) 0.25-14-74.9 % rectal ointment  "    No current facility-administered medications for this visit.         Allergies:     Allergies   Allergen Reactions     Beta Adrenergic Blockers Other (See Comments)     Hands arms and feet cold      Gadavist [Gadobutrol] Hives and Itching     Patient developed hives on forehead and anterior abdomen with itchiness of eyes and face within 30 seconds of Gadavist contrast injection. Symptoms started feeling better within about 15 minutes of injection.         Gadavist - 10 mL - Lot #: [id number] - NDC 28887-325-44     Azelastine Other (See Comments) and Dizziness     \"shaky\" and sick to stomach     Ciprofloxacin Other (See Comments) and Dizziness     and sick to stomach     Diltiazem Other (See Comments)     Blood pressure up and pulse up then down      Ibuprofen Other (See Comments)     Hypertension     Naproxen Other (See Comments)     Feels sick, like coming down with the stomach flu     Citalopram Anxiety and Other (See Comments)     Agitation, Side effects started in less than an hour after first pill           Family History:  Family History   Problem Relation Age of Onset     Hypertension Mother      Hypertension Father      Hypertension Brother      Hypertension Brother      Hypertension Brother      Hypertension Brother      Cancer Brother         prostate     Hypertension Brother      Hypertension Brother      Diabetes Brother      Cancer Brother      Hypertension Brother      Cancer Brother         prostate       Review of Systems   A 10-point review of systems was performed. Pertinent findings are noted in the HPI.    Physical Exam   ECOG Status: 1    Vitals:  There were no vitals taken for this visit.    Gen: Alert, in NAD  Head: NC/AT  Eyes: PERRL, EOMI, sclera anicteric  Ears: No external auricular lesions  Nose/sinus: No rhinorrhea or epistaxis  Oral cavity/oropharynx: MMM, no visible oral cavity lesions, FOM and BOT are soft to palpation  Neck: Full ROM, supple, no palpable adenopathy  Pulm: No " wheezing, stridor or respiratory distress  CV: Extremities are warm and well-perfused, no cyanosis, no pedal edema  Abdominal: Normal bowel sounds, soft, nontender, no masses  Musculoskeletal: Normal bulk and tone  Skin: Normal color and turgor  Neuro: A/Ox3, CN II-XII intact, normal gait    Imaging/Path/Labs   Imaging:   None    Path:   9/29/23  SPECIMEN     Procedure:    Excision     TUMOR     Tumor Focality:    Unifocal      Multiple Primary Sites:    Not applicable      Tumor Site:    Larynx, glottis        Tumor Subsite:    True vocal cord      Transglottic Extension:    Not identified      Tumor Laterality:    Right      Tumor Size:    Greatest Dimension (Centimeters): 0.7 cm      Histologic Type:           :    Squamous cell carcinoma, conventional (keratinizing)      Histologic Grade:    G1, well differentiated      Grade / Intrinsic Biologic Potential:    Not applicable      Lymphatic and / or Vascular Invasion:    Not identified      Perineural Invasion:    Not identified     MARGINS     Margin Status for Invasive Tumor:    Invasive tumor present at margin        Margin(s) Involved by Invasive Tumor:    deep      Margin Status for Noninvasive Tumor (High-grade Dysplasia):    High-grade dysplasia / in situ disease present at margin        Margin(s) Involved by Noninvasive Tumor:    deep     REGIONAL LYMPH NODES      Regional Lymph Node Status:    Not applicable (no regional lymph nodes submitted or found)     DISTANT METASTASIS      Distant Site(s) Involved:    Not applicable     pTNM CLASSIFICATION (AJCC 8th Edition)      Reporting of pT, pN, and (when applicable) pM categories is based on information available to the pathologist at the time the report is issued. As per the AJCC (Chapter 1, 8th Ed.) it is the managing physician s responsibility to establish the final pathologic stage based upon all pertinent information, including but potentially not limited to this pathology report.      Modified  Classification:    Not applicable      pT Category:    pT1a      T Suffix:    Not applicable      pN Category:    pN not assigned (no nodes submitted or found)      pM Category:    Not applicable - pM cannot be determined from the submitted specimen(s)     ADDITIONAL FINDINGS    Additional Findings:    Dysplasia, high grade      :    Carcinoma in situ      :    Keratosis / keratinizing     11/21/23  A. LEFT VOCAL CORD LESION, BIOPSY (FS)  --SQUAMOUS MUCOSA WITH KERATOSIS AND HIGH GRADE (MODERATE) SQUAMOUS DYSPLASIA  --NO DEFINITIVE INVASION IDENTIFIED (LEVELS EXAMINED)     B. RIGHT MID AND POSTERIOR VOCAL CORD LESION, RE-EXCISION (FS)  --SQUAMOUS MUCOSA WITH KERATOSIS AND HIGH GRADE (MODERATE) SQUAMOUS DYSPLASIA   --NO DEFINITIVE INVASION IDENTIFIED (LEVELS EXAMINED), SEE COMMENT     C. LEFT VOCAL CORD, LEFT LATERAL MARGIN, EXCISION (FS)  --SQUAMOUS MUCOSA WITH FOCAL HIGH GRADE (MODERATE) DYSPLASIA INVOLVING THE CAUTERIZED TISSUE EDGE  --RESPIRATORY MUCOSA WITH REACTIVE CHANGES ALSO PRESENT     D. LEFT VOCAL CORD, ANTERIOR MARGIN, EXCISION (FS)  --FOCAL DETACHED HIGH GRADE SQUAMOUS DYSPLASIA WITH CAUTERY ARTIFACT     E. LEFT VOCAL CORD, POSTERIOR MARGIN, EXCISION (FS)  --SQUAMOUS MUCOSA WITH KERATOSIS AND MILD TO MODERATE (HIGH GRADE) DYSPLASIA     F. LEFT VOCAL CORD, FREE EDGE INFERIOR MARGIN, EXCISION (FS)  --SQUAMOUS MUCOSA WITH KERATOSIS AND MILD DYSPLASIA  --TANGENTIAL SECTIONING ARTIFACT ALSO PRESENT     G. LEFT VOCAL CORD, INFERIOR FREE EDGE #2, EXCISION  -- SQUAMOUS MUCOSA WITH MILD ATYPIA, FAVOR REACTIVE CHANGES     H. LEFT VOCAL CORD, POSTERIOR #2, EXCISION   --SQUAMOUS MUCOSA WITH KERATOSIS AND MILD TO MODERATE (HIGH GRADE) DYSPLASIA          Assessment    Mr. 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  Evaluate potential role for radiation therapy.    Plan   I discussed  the natural history of what appears to be early stage squamous cell carcinoma of the glottis,  true vocal cord.    2.  I recommend obtaining CT scan of the neck to complete staging workup.    3.  The patient has undergone microlaryngoscopy which is demonstrated squamous cell carcinoma of the right true vocal cord.  Additionally the left vocal cord has demonstrated at least high-grade dysplasia in multiple areas.      4.  Given extent of disease I would recommend consideration of proceeding with definitive radiation therapy.  Tentatively will plan to treat the larynx to 63 [place] in 28 fractions.  I discussed side effects in great detail.    5.  I will have the patient return after obtaining CT scan of the neck.  Will also plan to schedule CT simulation at that time with tentative plan to proceed with definitive radiation therapy.      Joby Mari MD  Department of Radiation Oncology  [facility]

## 2023-12-21 NOTE — TELEPHONE ENCOUNTER
MEDICAL RECORDS REQUEST   Radiation Oncology  [address] [place] [address]  Fax: [phone]          FUTURE VISIT INFORMATION                                                   Jean Claude Sneed, : 1955 scheduled for future visit at [facility] Radiation Oncology    RECORDS REQUESTED FOR VISIT                                                     HEAD & NECK     OFFICE NOTE from [facility]CP -[facility] 23: Dr. Dav Ramos   OFFICE NOTE from ENT [facility] 23: Dr. Chu Mao   OPERATIVE/BIOPSY REPORTS [facility] 23: Microlaryngoscopy, Direct, Diagnostic with CO2 Laser Excision of Right Vocal Cord Lesion and Biopsy of Left Vocal Lesion 23: Laryngoscopy, Direct, Diagnostic with CO2 Laser, Excisional Biopsy of Right Vocal Cord Lesion    MEDICATION LIST [facility]    LABS     PATHOLOGY REPORTS Report in [facility] 23: [id number]  23   ANYTHING RELATED TO DIAGNOSIS [facility] Most recent 23   IMAGING (NEED IMAGES & REPORT)     XRAYS PACS [facility]:  23: XR Chest    PACS:  23: XR Chest

## 2023-12-21 NOTE — PROGRESS NOTES
Department of Radiation Oncology  Radiation Therapy Center  [facility]  [address], [address]  [place] [address]  [phone]       Consultation Note    Name: Jean Claude Sneed MRN: [id number]   : 1955   Date of Service: 2023  Referring: Dr. 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  Evaluate potential role for radiation therapy.    History of Present Illness   Mr. 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  Evaluate potential role for radiation therapy.    The patient initially presented after referral from audiology department for evaluation of mixed hearing loss.  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  The patient denies any hoarseness at the time.  Cords were noted to be mobile.  On 2023 the patient underwent direct laryngoscopy with excisional biopsy of the right vocal cord lesion.  Findings intraoperatively demonstrated plaque-like lesion along the right true vocal cord and less prominent leukoplakia on the left side.  Right true vocal cord lesion was excised.  Pathology demonstrated 7 mm squamous cell carcinoma, well-differentiated, positive margin.  Evaluation of the left vocal cord region did not demonstrate cancer, but did demonstrate mild dysplasia.  The patient subsequently underwent microlaryngoscopy on 2023 by Dr. Juan.  Findings intraoperatively demonstrated an exophytic lesion along the left anterior cord that is changed compared to prior evaluation.  No obvious lesion is noted on the right side.   There was noted a small area of exophytic tissue anteriorly on the right side.  However Dr. Carias elected to focus, and left side which had changed since prior diagnosis.  The left side there was noted to be an exophytic lesion along the left anterior cord.  Left vocal cord lesions were obtained which demonstrated high-grade squamous dysplasia without clear evidence of invasive disease.  Reexcision of the right vocal cord demonstrated high-grade squamous cell dysplasia.  Multiple areas were sampled and appears to be that there were presence of at least severe dysplasia although the left side and possibility of skip lesions.  The patient was subsequently referred to our clinic to discuss potential role of radiation therapy.    Today the patient notes he is overall doing fairly well.  There is no pruritus after his 2 prior direct microlaryngoscopies.  No prior radiation.  No pacemaker.  Prior smoker, quit 14 years ago.    Past Medical History:   Past Medical History:   Diagnosis Date    HTN (hypertension)        Past Surgical History:   Past Surgical History:   Procedure Laterality Date    left knee surgery  1996       Chemotherapy History:  None    Radiation History:  None    Pregnant: Not Applicable  Implanted Cardiac Devices: No    Medications:  Current Outpatient Medications   Medication    acetaminophen (TYLENOL) 500 MG tablet    albuterol (PROAIR HFA/PROVENTIL HFA/VENTOLIN HFA) 108 (90 Base) MCG/ACT inhaler    aspirin 81 MG EC tablet    atorvastatin (LIPITOR) 20 MG tablet    fluticasone (FLONASE) 50 MCG/ACT nasal spray    omeprazole (PRILOSEC) 40 MG DR capsule    phenylephrine-mineral oil-petrolatum (PREPARATION H) 0.25-14-74.9 % rectal ointment     No current facility-administered medications for this visit.         Allergies:     Allergies   Allergen Reactions    Beta Adrenergic Blockers Other (See Comments)     Hands arms and feet cold     Gadavist [Gadobutrol] Hives and Itching     Patient developed hives  "on forehead and anterior abdomen with itchiness of eyes and face within 30 seconds of Gadavist contrast injection. Symptoms started feeling better within about 15 minutes of injection.         Gadavist - 10 mL - Lot #: [id number] - NDC 20404-495-55    Azelastine Other (See Comments) and Dizziness     \"shaky\" and sick to stomach    Ciprofloxacin Other (See Comments) and Dizziness     and sick to stomach    Diltiazem Other (See Comments)     Blood pressure up and pulse up then down     Ibuprofen Other (See Comments)     Hypertension    Naproxen Other (See Comments)     Feels sick, like coming down with the stomach flu    Citalopram Anxiety and Other (See Comments)     Agitation, Side effects started in less than an hour after first pill           Family History:  Family History   Problem Relation Age of Onset    Hypertension Mother     Hypertension Father     Hypertension Brother     Hypertension Brother     Hypertension Brother     Hypertension Brother     Cancer Brother         prostate    Hypertension Brother     Hypertension Brother     Diabetes Brother     Cancer Brother     Hypertension Brother     Cancer Brother         prostate       Review of Systems   A 10-point review of systems was performed. Pertinent findings are noted in the HPI.    Physical Exam   ECOG Status: 1    Vitals:  There were no vitals taken for this visit.    Gen: Alert, in NAD  Head: NC/AT  Eyes: PERRL, EOMI, sclera anicteric  Ears: No external auricular lesions  Nose/sinus: No rhinorrhea or epistaxis  Oral cavity/oropharynx: MMM, no visible oral cavity lesions, FOM and BOT are soft to palpation  Neck: Full ROM, supple, no palpable adenopathy  Pulm: No wheezing, stridor or respiratory distress  CV: Extremities are warm and well-perfused, no cyanosis, no pedal edema  Abdominal: Normal bowel sounds, soft, nontender, no masses  Musculoskeletal: Normal bulk and tone  Skin: Normal color and turgor  Neuro: A/Ox3, CN II-XII intact, normal " gait    Imaging/Path/Labs   Imaging:   None    Path:   9/29/23  SPECIMEN     Procedure:    Excision     TUMOR     Tumor Focality:    Unifocal      Multiple Primary Sites:    Not applicable      Tumor Site:    Larynx, glottis        Tumor Subsite:    True vocal cord      Transglottic Extension:    Not identified      Tumor Laterality:    Right      Tumor Size:    Greatest Dimension (Centimeters): 0.7 cm      Histologic Type:           :    Squamous cell carcinoma, conventional (keratinizing)      Histologic Grade:    G1, well differentiated      Grade / Intrinsic Biologic Potential:    Not applicable      Lymphatic and / or Vascular Invasion:    Not identified      Perineural Invasion:    Not identified     MARGINS     Margin Status for Invasive Tumor:    Invasive tumor present at margin        Margin(s) Involved by Invasive Tumor:    deep      Margin Status for Noninvasive Tumor (High-grade Dysplasia):    High-grade dysplasia / in situ disease present at margin        Margin(s) Involved by Noninvasive Tumor:    deep     REGIONAL LYMPH NODES      Regional Lymph Node Status:    Not applicable (no regional lymph nodes submitted or found)     DISTANT METASTASIS      Distant Site(s) Involved:    Not applicable     pTNM CLASSIFICATION (AJCC 8th Edition)      Reporting of pT, pN, and (when applicable) pM categories is based on information available to the pathologist at the time the report is issued. As per the AJCC (Chapter 1, 8th Ed.) it is the managing physician s responsibility to establish the final pathologic stage based upon all pertinent information, including but potentially not limited to this pathology report.      Modified Classification:    Not applicable      pT Category:    pT1a      T Suffix:    Not applicable      pN Category:    pN not assigned (no nodes submitted or found)      pM Category:    Not applicable - pM cannot be determined from the submitted specimen(s)     ADDITIONAL FINDINGS    Additional  Findings:    Dysplasia, high grade      :    Carcinoma in situ      :    Keratosis / keratinizing     11/21/23  A. LEFT VOCAL CORD LESION, BIOPSY (FS)  --SQUAMOUS MUCOSA WITH KERATOSIS AND HIGH GRADE (MODERATE) SQUAMOUS DYSPLASIA  --NO DEFINITIVE INVASION IDENTIFIED (LEVELS EXAMINED)     B. RIGHT MID AND POSTERIOR VOCAL CORD LESION, RE-EXCISION (FS)  --SQUAMOUS MUCOSA WITH KERATOSIS AND HIGH GRADE (MODERATE) SQUAMOUS DYSPLASIA   --NO DEFINITIVE INVASION IDENTIFIED (LEVELS EXAMINED), SEE COMMENT     C. LEFT VOCAL CORD, LEFT LATERAL MARGIN, EXCISION (FS)  --SQUAMOUS MUCOSA WITH FOCAL HIGH GRADE (MODERATE) DYSPLASIA INVOLVING THE CAUTERIZED TISSUE EDGE  --RESPIRATORY MUCOSA WITH REACTIVE CHANGES ALSO PRESENT     D. LEFT VOCAL CORD, ANTERIOR MARGIN, EXCISION (FS)  --FOCAL DETACHED HIGH GRADE SQUAMOUS DYSPLASIA WITH CAUTERY ARTIFACT     E. LEFT VOCAL CORD, POSTERIOR MARGIN, EXCISION (FS)  --SQUAMOUS MUCOSA WITH KERATOSIS AND MILD TO MODERATE (HIGH GRADE) DYSPLASIA     F. LEFT VOCAL CORD, FREE EDGE INFERIOR MARGIN, EXCISION (FS)  --SQUAMOUS MUCOSA WITH KERATOSIS AND MILD DYSPLASIA  --TANGENTIAL SECTIONING ARTIFACT ALSO PRESENT     G. LEFT VOCAL CORD, INFERIOR FREE EDGE #2, EXCISION  -- SQUAMOUS MUCOSA WITH MILD ATYPIA, FAVOR REACTIVE CHANGES     H. LEFT VOCAL CORD, POSTERIOR #2, EXCISION   --SQUAMOUS MUCOSA WITH KERATOSIS AND MILD TO MODERATE (HIGH GRADE) DYSPLASIA          Assessment    Mr. 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  Evaluate potential role for radiation therapy.    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    2.  I recommend obtaining CT scan of the neck to complete staging workup.    3.  The patient has undergone microlaryngoscopy which is demonstrated squamous cell carcinoma of the  right true vocal cord.  Additionally the left vocal cord has demonstrated at least high-grade dysplasia in multiple areas.      4.  Given extent of disease I would recommend consideration of proceeding with definitive radiation therapy.  Tentatively will plan to treat the larynx to 63 Malloy in 28 fractions.  I discussed side effects in great detail.    5.  I will have the patient return after obtaining CT scan of the neck.  Will also plan to schedule CT simulation at that time with tentative plan to proceed with definitive radiation therapy.      Joby Mari MD  Department of Radiation Oncology  [facility]

## 2023-12-29 ENCOUNTER — HOSPITAL ENCOUNTER (OUTPATIENT)
Dept: CT IMAGING | Facility: CLINIC | Age: 68
Discharge: HOME | End: 2023-12-29
Attending: RADIOLOGY | Admitting: RADIOLOGY
Payer: COMMERCIAL

## 2023-12-29 DIAGNOSIS — C32.0 VOCAL CORD CANCER (H): ICD-10-CM

## 2023-12-29 LAB
CREAT BLD-MCNC: 1.6 MG/DL (ref 0.7–1.3)
EGFRCR SERPLBLD CKD-EPI 2021: 46 ML/MIN/1.73M2

## 2023-12-29 PROCEDURE — 82565 ASSAY OF CREATININE: CPT

## 2023-12-29 PROCEDURE — 250N000009 HC RX 250: Performed by: RADIOLOGY

## 2023-12-29 PROCEDURE — 250N000011 HC RX IP 250 OP 636: Performed by: RADIOLOGY

## 2023-12-29 PROCEDURE — 70491 CT SOFT TISSUE NECK W/DYE: CPT

## 2023-12-29 RX ORDER — IOPAMIDOL 755 MG/ML
90 INJECTION, SOLUTION INTRAVASCULAR ONCE
Status: COMPLETED | OUTPATIENT
Start: 2023-12-29 | End: 2023-12-29

## 2023-12-29 RX ADMIN — SODIUM CHLORIDE 80 ML: 9 INJECTION, SOLUTION INTRAVENOUS at 14:53

## 2023-12-29 RX ADMIN — IOPAMIDOL 90 ML: 755 INJECTION, SOLUTION INTRAVENOUS at 14:53

## 2024-01-04 ENCOUNTER — OFFICE VISIT (OUTPATIENT)
Dept: RADIATION THERAPY | Facility: OUTPATIENT CENTER | Age: 69
End: 2024-01-04

## 2024-01-04 DIAGNOSIS — C32.0 VOCAL CORD CANCER (H): Primary | ICD-10-CM

## 2024-01-04 NOTE — PROGRESS NOTES
The patient returns for follow-up.    In the interval, the patient went CT scan of the neck.  Imaging did not demonstrate any evidence of regional adenopathy.  No clear glottic lesion was noted.  He however did demonstrate an indeterminate lesion in the right tonsillar region.    Plan:  I recommend reevaluation by ENT team to evaluate indeterminate lesion of the right tonsillar region.    I will have the patient return to our clinic after ENT evaluation to discuss next steps in management.    Joby Mari M.D.  Department of Radiation Oncology  [facility]

## 2024-01-04 NOTE — LETTER
1/4/2024         RE: Jean Claude Sneed  [address] [facility] [address]        Dear Colleague,    Thank you for referring your patient, Jean Claude Sneed, to the [facility] RADIATION THERAPY CLINIC. Please see a copy of my visit note below.    The patient returns for follow-up.    In the interval, the patient went CT scan of the neck.  Imaging did not demonstrate any evidence of regional adenopathy.  No clear glottic lesion was noted.  He however did demonstrate an indeterminate lesion in the right tonsillar region.    Plan:  I recommend reevaluation by ENT team to evaluate indeterminate lesion of the right tonsillar region.    I will have the patient return to our clinic after ENT evaluation to discuss next steps in management.    Joby Mari M.D.  Department of Radiation Oncology  [facility]

## 2024-01-04 NOTE — NURSING NOTE
"Oncology Rooming Note    January 4, 2024 2:32 PM   Jean Claude Sneed is a 68 year old male who presents for:    Chief Complaint   Patient presents with    Radiation Therapy     Follow up after CT scan     Initial Vitals: There were no vitals taken for this visit. Estimated body mass index is 34.44 kg/m  as calculated from the following:    Height as of 7/20/22: 1.778 m (5' 10\").    Weight as of 12/21/23: 108.9 kg (240 lb). There is no height or weight on file to calculate BSA.  Data Unavailable Comment: Data Unavailable   No LMP for male patient.  Allergies reviewed: Yes  Medications reviewed: Yes    Medications: Medication refills not needed today.  Pharmacy name entered into [facility]:    [facility] - [place], [place] - [address] [place] [address] [facility] - [place], [place] - [address] [facility]    Frailty Screening:   Is the patient here for a new oncology consult visit in cancer care? 2. No      Clinical concerns: patient returns to review CT scan of H&N area - Dr. Mari noted area of concern on R tonisl - will need to have pt return to Dr. Juan for further work up.  Will push scan/report. MD talking with patient about further work up - cannot start radiation at this time to glottis due to concerns on CT scan  MD was notified.      Laurie Armas RN             "

## 2024-01-10 PROBLEM — J35.9 LESION OF TONSIL: Status: ACTIVE | Noted: 2024-01-04

## 2024-01-10 PROBLEM — C32.0: Status: ACTIVE | Noted: 2023-12-21

## 2024-01-11 ENCOUNTER — TRANSFERRED RECORDS (OUTPATIENT)
Dept: RADIATION THERAPY | Facility: OUTPATIENT CENTER | Age: 69
End: 2024-01-11

## 2024-02-06 ENCOUNTER — TRANSFERRED RECORDS (OUTPATIENT)
Dept: HEALTH INFORMATION MANAGEMENT | Facility: CLINIC | Age: 69
End: 2024-02-06

## 2024-02-12 ENCOUNTER — OFFICE VISIT (OUTPATIENT)
Dept: RADIATION THERAPY | Facility: OUTPATIENT CENTER | Age: 69
End: 2024-02-12

## 2024-02-12 DIAGNOSIS — C32.0 VOCAL CORD CANCER (H): Primary | ICD-10-CM

## 2024-02-12 NOTE — PROGRESS NOTES
Patient returns for follow-up.    In the interval, the patient underwent right tonsillectomy on 1/31/2024 to evaluate for indeterminate change noted on CT scan.  Pathology was negative for malignancy.    Feels he is overall recovering well.  Has noted some bleeding from right tonsillar site overnight.  This has since resolved this morning.  Will reach out to ENT team for further evaluation.    I otherwise, tentatively discussed proceeding with CT simulation today with likely radiation therapy to the larynx soon thereafter.  Tentatively plan to treat to a total dose of 63 Gray in 28 fractions.    I discussed side effects in great detail.  Consent was obtained.    Joby Mari M.D.  Department of Radiation Oncology  [facility]

## 2024-02-12 NOTE — LETTER
2/12/2024         RE: Jean Claude Sneed  [address] [facility] [address]        Dear Colleague,    Thank you for referring your patient, Jean Claude Sneed, to the [facility] RADIATION THERAPY CLINIC. Please see a copy of my visit note below.    Patient returns for follow-up.    In the interval, the patient underwent right tonsillectomy on 1/31/2024 to evaluate for indeterminate change noted on CT scan.  Pathology was negative for malignancy.    Feels he is overall recovering well.  Has noted some bleeding from right tonsillar site overnight.  This has since resolved this morning.  Will reach out to ENT team for further evaluation.    I otherwise, tentatively discussed proceeding with CT simulation today with likely radiation therapy to the larynx soon thereafter.  Tentatively plan to treat to a total dose of 63 Gray in 28 fractions.    I discussed side effects in great detail.  Consent was obtained.    Joby Mari M.D.  Department of Radiation Oncology  [facility]

## 2024-02-21 ENCOUNTER — OFFICE VISIT (OUTPATIENT)
Dept: RADIATION THERAPY | Facility: OUTPATIENT CENTER | Age: 69
End: 2024-02-21

## 2024-02-21 ENCOUNTER — APPOINTMENT (OUTPATIENT)
Dept: RADIATION THERAPY | Facility: OUTPATIENT CENTER | Age: 69
End: 2024-02-21

## 2024-02-21 VITALS
WEIGHT: 238 LBS | SYSTOLIC BLOOD PRESSURE: 167 MMHG | DIASTOLIC BLOOD PRESSURE: 92 MMHG | BODY MASS INDEX: 34.15 KG/M2 | HEART RATE: 69 BPM

## 2024-02-21 DIAGNOSIS — C32.0 VOCAL CORD CANCER (H): Primary | ICD-10-CM

## 2024-02-21 NOTE — LETTER
2024         RE: Jean Claude Sneed  [address] [facility] [address]        Dear Colleague,    Thank you for referring your patient, Jean Claude Sneed, to the  [facility]. Please see a copy of my visit note below.    [facility]  SPECIALIZING IN BREAKTHROUGHS  Radiation Oncology    On Treatment Visit Note      Jean Claude Sneed      Date: 2024   MRN: [id number]   : 1955  Diagnosis: SCC right vocal cord/glottis      Reason for Visit:  On Radiation Treatment Visit     Treatment Summary to Date  Treatment Site: head/neck Current Dose: 225/6300 cGy Fractions:       Chemotherapy  Chemo concurrent with radx?: No    Subjective:   Doing well.  Stable hoarseness.  No respiratory distress.  No pain with swallowing.  Tolerating p.o.  No xerostomia or taste change.    Nursing ROS:   Nutrition Alteration  Diet Type: Patient's Preference  Skin  Skin Reaction: 0 - No changes     ENT and Mouth Exam  ENT/Mouth Note: hoarse voice, not new, swallow ok  Cardiovascular  Respiratory effort: 1 - Normal - without distress  Gastrointestinal  GI Note: no gi issues        Pain Assessment  Pain Note: no pain issues      Objective:   BP (!) 167/92   Pulse 69   Wt 108 kg (238 lb)   BMI 34.15 kg/m    NAD  + Hoarse  No respiratory distress    Labs:  CBC RESULTS:   Recent Labs   Lab Test 23  1659   WBC 9.6   RBC 6.11*   HGB 18.2*   HCT 54.4*   MCV 89   MCH 29.8   MCHC 33.5   RDW 12.6        ELECTROLYTES:  Recent Labs   Lab Test 23  1447 23  1437   NA  --  136   POTASSIUM  --  4.9   CHLORIDE  --  99   JORGE  --  9.8   CO2  --  24   BUN  --  31.9*   CR 1.6* 1.73*   GLC  --  345*       Assessment:  Mr. 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  He is undergoing definitive radiation therapy.    Tolerating radiation therapy well.  All  questions and concerns addressed.    Plan:   Continue current therapy.        Mosaiq chart and setup information reviewed  Ports checked    Medication Review  Med list reviewed with patient?: Yes           Joby Mari MD

## 2024-02-21 NOTE — PROGRESS NOTES
[facility]  SPECIALIZING IN BREAKTHROUGHS  Radiation Oncology    On Treatment Visit Note      Jean Claude Sneed      Date: 2024   MRN: [id number]   : 1955  Diagnosis: SCC right vocal cord/glottis      Reason for Visit:  On Radiation Treatment Visit     Treatment Summary to Date  Treatment Site: head/neck Current Dose: 225/6300 cGy Fractions:       Chemotherapy  Chemo concurrent with radx?: No    Subjective:   Doing well.  Stable hoarseness.  No respiratory distress.  No pain with swallowing.  Tolerating p.o.  No xerostomia or taste change.    Nursing ROS:   Nutrition Alteration  Diet Type: Patient's Preference  Skin  Skin Reaction: 0 - No changes     ENT and Mouth Exam  ENT/Mouth Note: hoarse voice, not new, swallow ok  Cardiovascular  Respiratory effort: 1 - Normal - without distress  Gastrointestinal  GI Note: no gi issues        Pain Assessment  Pain Note: no pain issues      Objective:   BP (!) 167/92   Pulse 69   Wt 108 kg (238 lb)   BMI 34.15 kg/m    NAD  + Hoarse  No respiratory distress    Labs:  CBC RESULTS:   Recent Labs   Lab Test 23  1659   WBC 9.6   RBC 6.11*   HGB 18.2*   HCT 54.4*   MCV 89   MCH 29.8   MCHC 33.5   RDW 12.6        ELECTROLYTES:  Recent Labs   Lab Test 23  1447 23  1437   NA  --  136   POTASSIUM  --  4.9   CHLORIDE  --  99   JORGE  --  9.8   CO2  --  24   BUN  --  31.9*   CR 1.6* 1.73*   GLC  --  345*       Assessment:  Mr. 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  He is undergoing definitive radiation therapy.    Tolerating radiation therapy well.  All questions and concerns addressed.    Plan:   Continue current therapy.        Mosaiq chart and setup information reviewed  Ports checked    Medication Review  Med list reviewed with patient?: Yes           Joby Mari MD

## 2024-02-22 ENCOUNTER — APPOINTMENT (OUTPATIENT)
Dept: RADIATION THERAPY | Facility: OUTPATIENT CENTER | Age: 69
End: 2024-02-22

## 2024-02-23 ENCOUNTER — APPOINTMENT (OUTPATIENT)
Dept: RADIATION THERAPY | Facility: OUTPATIENT CENTER | Age: 69
End: 2024-02-23

## 2024-02-26 ENCOUNTER — APPOINTMENT (OUTPATIENT)
Dept: RADIATION THERAPY | Facility: OUTPATIENT CENTER | Age: 69
End: 2024-02-26

## 2024-02-27 ENCOUNTER — APPOINTMENT (OUTPATIENT)
Dept: RADIATION THERAPY | Facility: OUTPATIENT CENTER | Age: 69
End: 2024-02-27

## 2024-02-28 ENCOUNTER — APPOINTMENT (OUTPATIENT)
Dept: RADIATION THERAPY | Facility: OUTPATIENT CENTER | Age: 69
End: 2024-02-28

## 2024-02-28 ENCOUNTER — OFFICE VISIT (OUTPATIENT)
Dept: RADIATION THERAPY | Facility: OUTPATIENT CENTER | Age: 69
End: 2024-02-28

## 2024-02-28 VITALS
WEIGHT: 241 LBS | BODY MASS INDEX: 34.58 KG/M2 | HEART RATE: 81 BPM | SYSTOLIC BLOOD PRESSURE: 161 MMHG | DIASTOLIC BLOOD PRESSURE: 92 MMHG

## 2024-02-28 DIAGNOSIS — C32.0 SQUAMOUS CELL CARCINOMA OF RIGHT VOCAL CORD (H): Primary | ICD-10-CM

## 2024-02-28 NOTE — PROGRESS NOTES
[facility]  SPECIALIZING IN BREAKTHROUGHS  Radiation Oncology    On Treatment Visit Note      Jean Claude Sneed      Date: 2024   MRN: [id number]   : 1955  Diagnosis: SCC right vocal cord/glottis      Reason for Visit:  On Radiation Treatment Visit     Treatment Summary to Date  Treatment Site: head/neck Current Dose: 1350/6300 cGy Fractions:       Chemotherapy  Chemo concurrent with radx?: No    Subjective:   Doing well.  Stable hoarseness.  No respiratory distress.  No pain with swallowing.  Tolerating p.o.  Mild xerostomia. No taste change.    Nursing ROS:   Nutrition Alteration  Diet Type: Patient's Preference  Skin  Skin Reaction: 0 - No changes     ENT and Mouth Exam  ENT/Mouth Note: hoarse voice, not new, swallow ok  Cardiovascular  Respiratory effort: 1 - Normal - without distress  Gastrointestinal  GI Note: no gi issues        Pain Assessment  Pain Note: no pain issues      Objective:   BP (!) 161/92   Pulse 81   Wt 109.3 kg (241 lb)   BMI 34.58 kg/m    NAD  + Hoarse  No respiratory distress    Labs:  CBC RESULTS:   Recent Labs   Lab Test 23  1659   WBC 9.6   RBC 6.11*   HGB 18.2*   HCT 54.4*   MCV 89   MCH 29.8   MCHC 33.5   RDW 12.6        ELECTROLYTES:  Recent Labs   Lab Test 23  1447 23  1437   NA  --  136   POTASSIUM  --  4.9   CHLORIDE  --  99   JORGE  --  9.8   CO2  --  24   BUN  --  31.9*   CR 1.6* 1.73*   GLC  --  345*       Assessment:  Mr. 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  He is undergoing definitive radiation therapy.    Tolerating radiation therapy well.  All questions and concerns addressed.    Plan:   Continue current therapy.    Xerostomia.  Salt and soda rinses as needed.  Hydration.  I would also recommend referral to speech swallow team to minimize long term risk of dysphagia.      Mosaiq chart and  setup information reviewed  Ports checked    Medication Review  Med list reviewed with patient?: Yes           Joby Mari MD

## 2024-02-28 NOTE — LETTER
2024         RE: Jean Claude Sneed  [address] [facility] [address]        Dear Colleague,    Thank you for referring your patient, Jean Claude Sneed, to the  [facility]. Please see a copy of my visit note below.    [facility]  SPECIALIZING IN BREAKTHROUGHS  Radiation Oncology    On Treatment Visit Note      Jean Claude Sneed      Date: 2024   MRN: [id number]   : 1955  Diagnosis: SCC right vocal cord/glottis      Reason for Visit:  On Radiation Treatment Visit     Treatment Summary to Date  Treatment Site: head/neck Current Dose: 1350/6300 cGy Fractions:       Chemotherapy  Chemo concurrent with radx?: No    Subjective:   Doing well.  Stable hoarseness.  No respiratory distress.  No pain with swallowing.  Tolerating p.o.  Mild xerostomia. No taste change.    Nursing ROS:   Nutrition Alteration  Diet Type: Patient's Preference  Skin  Skin Reaction: 0 - No changes     ENT and Mouth Exam  ENT/Mouth Note: hoarse voice, not new, swallow ok  Cardiovascular  Respiratory effort: 1 - Normal - without distress  Gastrointestinal  GI Note: no gi issues        Pain Assessment  Pain Note: no pain issues      Objective:   BP (!) 161/92   Pulse 81   Wt 109.3 kg (241 lb)   BMI 34.58 kg/m    NAD  + Hoarse  No respiratory distress    Labs:  CBC RESULTS:   Recent Labs   Lab Test 23  1659   WBC 9.6   RBC 6.11*   HGB 18.2*   HCT 54.4*   MCV 89   MCH 29.8   MCHC 33.5   RDW 12.6        ELECTROLYTES:  Recent Labs   Lab Test 23  1447 23  1437   NA  --  136   POTASSIUM  --  4.9   CHLORIDE  --  99   JORGE  --  9.8   CO2  --  24   BUN  --  31.9*   CR 1.6* 1.73*   GLC  --  345*       Assessment:  Mr. 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  He is undergoing definitive radiation therapy.    Tolerating radiation therapy well.  All  questions and concerns addressed.    Plan:   Continue current therapy.    Xerostomia.  Salt and soda rinses as needed.  Hydration.      Mosaiq chart and setup information reviewed  Ports checked    Medication Review  Med list reviewed with patient?: Yes           Joby Mari MD

## 2024-02-29 ENCOUNTER — APPOINTMENT (OUTPATIENT)
Dept: RADIATION THERAPY | Facility: OUTPATIENT CENTER | Age: 69
End: 2024-02-29

## 2024-03-01 ENCOUNTER — APPOINTMENT (OUTPATIENT)
Dept: RADIATION THERAPY | Facility: OUTPATIENT CENTER | Age: 69
End: 2024-03-01

## 2024-03-04 ENCOUNTER — APPOINTMENT (OUTPATIENT)
Dept: RADIATION THERAPY | Facility: OUTPATIENT CENTER | Age: 69
End: 2024-03-04

## 2024-03-05 ENCOUNTER — APPOINTMENT (OUTPATIENT)
Dept: RADIATION THERAPY | Facility: OUTPATIENT CENTER | Age: 69
End: 2024-03-05

## 2024-03-06 ENCOUNTER — APPOINTMENT (OUTPATIENT)
Dept: RADIATION THERAPY | Facility: OUTPATIENT CENTER | Age: 69
End: 2024-03-06

## 2024-03-06 ENCOUNTER — OFFICE VISIT (OUTPATIENT)
Dept: RADIATION THERAPY | Facility: OUTPATIENT CENTER | Age: 69
End: 2024-03-06

## 2024-03-06 VITALS
WEIGHT: 243 LBS | HEART RATE: 67 BPM | RESPIRATION RATE: 18 BRPM | DIASTOLIC BLOOD PRESSURE: 94 MMHG | SYSTOLIC BLOOD PRESSURE: 159 MMHG | BODY MASS INDEX: 34.87 KG/M2 | OXYGEN SATURATION: 94 %

## 2024-03-06 DIAGNOSIS — C32.0 SQUAMOUS CELL CARCINOMA OF RIGHT VOCAL CORD (H): Primary | ICD-10-CM

## 2024-03-06 ASSESSMENT — PAIN SCALES - GENERAL: PAINLEVEL: NO PAIN (0)

## 2024-03-06 NOTE — LETTER
3/6/2024         RE: Jean Claude Sneed  [address] [facility] [address]        Dear Colleague,    Thank you for referring your patient, Jean Claude Sneed, to the  [facility]. Please see a copy of my visit note below.    [facility]  SPECIALIZING IN BREAKTHROUGHS  Radiation Oncology    On Treatment Visit Note      Jean Claude Sneed      Date: 3/6/2024   MRN: [id number]   : 1955  Diagnosis: SCC right vocal cord/glottis      Reason for Visit:  On Radiation Treatment Visit     Treatment Summary to Date  Treatment Site: head/neck Current Dose: 2475/6300 cGy Fractions:       Chemotherapy  Chemo concurrent with radx?: No    Subjective:   Doing well.  Slightly worse hoarseness.  No respiratory distress.  Mild pain with swallowing.  Tolerating p.o.  Mild xerostomia. No taste change.    Nursing ROS:   Nutrition Alteration  Diet Type: Patient's Preference  Skin  Skin Reaction: 0 - No changes     ENT and Mouth Exam  ENT/Mouth Note: hoarse voice, not new, swallow ok  Cardiovascular  Respiratory effort: 1 - Normal - without distress  Gastrointestinal  GI Note: no gi issues        Pain Assessment  Pain Note: no pain issues      Objective:   BP (!) 159/94   Pulse 67   Resp 18   Wt 110.2 kg (243 lb)   SpO2 94%   BMI 34.87 kg/m    NAD  + Hoarse  No respiratory distress    Labs:  CBC RESULTS:   Recent Labs   Lab Test 23  1659   WBC 9.6   RBC 6.11*   HGB 18.2*   HCT 54.4*   MCV 89   MCH 29.8   MCHC 33.5   RDW 12.6        ELECTROLYTES:  Recent Labs   Lab Test 23  1447 23  1437   NA  --  136   POTASSIUM  --  4.9   CHLORIDE  --  99   JORGE  --  9.8   CO2  --  24   BUN  --  31.9*   CR 1.6* 1.73*   GLC  --  345*       Assessment:  Mr. 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  He is undergoing definitive radiation  therapy.    Tolerating radiation therapy well.  All questions and concerns addressed.    Plan:   Continue current therapy.    Xerostomia.  Salt and soda rinses as needed.  Hydration.  Radiation Esophagitis.  Reports having Magic mouthwash at home if needed.  Cancer related pain. Patient noted he has narcotic pain medication at home if needed.  I would also recommend referral to speech swallow team to minimize long term risk of dysphagia.      Mosaiq chart and setup information reviewed  Ports checked    Medication Review  Med list reviewed with patient?: Yes           Joby Mari MD

## 2024-03-06 NOTE — PROGRESS NOTES
[facility]  SPECIALIZING IN BREAKTHROUGHS  Radiation Oncology    On Treatment Visit Note      Jean Claude Sneed      Date: 3/6/2024   MRN: [id number]   : 1955  Diagnosis: SCC right vocal cord/glottis      Reason for Visit:  On Radiation Treatment Visit     Treatment Summary to Date  Treatment Site: head/neck Current Dose: 2475/6300 cGy Fractions:       Chemotherapy  Chemo concurrent with radx?: No    Subjective:   Doing well.  Slightly worse hoarseness.  No respiratory distress.  Mild pain with swallowing.  Tolerating p.o.  Mild xerostomia. No taste change.    Nursing ROS:   Nutrition Alteration  Diet Type: Patient's Preference  Skin  Skin Reaction: 0 - No changes     ENT and Mouth Exam  ENT/Mouth Note: hoarse voice, not new, swallow ok  Cardiovascular  Respiratory effort: 1 - Normal - without distress  Gastrointestinal  GI Note: no gi issues        Pain Assessment  Pain Note: no pain issues      Objective:   BP (!) 159/94   Pulse 67   Resp 18   Wt 110.2 kg (243 lb)   SpO2 94%   BMI 34.87 kg/m    NAD  + Hoarse  No respiratory distress    Labs:  CBC RESULTS:   Recent Labs   Lab Test 23  1659   WBC 9.6   RBC 6.11*   HGB 18.2*   HCT 54.4*   MCV 89   MCH 29.8   MCHC 33.5   RDW 12.6        ELECTROLYTES:  Recent Labs   Lab Test 23  1447 23  1437   NA  --  136   POTASSIUM  --  4.9   CHLORIDE  --  99   JORGE  --  9.8   CO2  --  24   BUN  --  31.9*   CR 1.6* 1.73*   GLC  --  345*       Assessment:  Mr. 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  He is undergoing definitive radiation therapy.    Tolerating radiation therapy well.  All questions and concerns addressed.    Plan:   Continue current therapy.    Xerostomia.  Salt and soda rinses as needed.  Hydration.  Radiation Esophagitis.  Reports having Magic mouthwash at home if needed.  Cancer  related pain. Patient noted he has narcotic pain medication at home if needed.  I would also recommend referral to speech swallow team to minimize long term risk of dysphagia.      Mosaiq chart and setup information reviewed  Ports checked    Medication Review  Med list reviewed with patient?: Yes           Joby Mari MD

## 2024-03-07 ENCOUNTER — APPOINTMENT (OUTPATIENT)
Dept: RADIATION THERAPY | Facility: OUTPATIENT CENTER | Age: 69
End: 2024-03-07

## 2024-03-07 RX ORDER — OXYCODONE HCL 5 MG/5 ML
10 SOLUTION, ORAL ORAL EVERY 4 HOURS PRN
COMMUNITY
Start: 2024-01-31 | End: 2024-03-25

## 2024-03-08 ENCOUNTER — APPOINTMENT (OUTPATIENT)
Dept: RADIATION THERAPY | Facility: OUTPATIENT CENTER | Age: 69
End: 2024-03-08

## 2024-03-11 ENCOUNTER — APPOINTMENT (OUTPATIENT)
Dept: RADIATION THERAPY | Facility: OUTPATIENT CENTER | Age: 69
End: 2024-03-11

## 2024-03-12 ENCOUNTER — APPOINTMENT (OUTPATIENT)
Dept: RADIATION THERAPY | Facility: OUTPATIENT CENTER | Age: 69
End: 2024-03-12

## 2024-03-13 ENCOUNTER — OFFICE VISIT (OUTPATIENT)
Dept: RADIATION THERAPY | Facility: OUTPATIENT CENTER | Age: 69
End: 2024-03-13

## 2024-03-13 ENCOUNTER — APPOINTMENT (OUTPATIENT)
Dept: RADIATION THERAPY | Facility: OUTPATIENT CENTER | Age: 69
End: 2024-03-13

## 2024-03-13 ENCOUNTER — TRANSCRIBE ORDERS (OUTPATIENT)
Dept: OTHER | Age: 69
End: 2024-03-13

## 2024-03-13 VITALS
BODY MASS INDEX: 33.15 KG/M2 | WEIGHT: 231 LBS | DIASTOLIC BLOOD PRESSURE: 75 MMHG | SYSTOLIC BLOOD PRESSURE: 137 MMHG | HEART RATE: 71 BPM

## 2024-03-13 DIAGNOSIS — D09.8 CARCINOMA IN SITU OF OTHER SPECIFIED SITES: Primary | ICD-10-CM

## 2024-03-13 DIAGNOSIS — C32.0 SQUAMOUS CELL CARCINOMA OF RIGHT VOCAL CORD (H): Primary | ICD-10-CM

## 2024-03-13 NOTE — PROGRESS NOTES
[facility]  SPECIALIZING IN BREAKTHROUGHS  Radiation Oncology    On Treatment Visit Note      Jean Claude Sneed      Date: 3/13/2024   MRN: [id number]   : 1955  Diagnosis: SCC right vocal cord/glottis      Reason for Visit:  On Radiation Treatment Visit     Treatment Summary to Date  Treatment Site: head/neck Current Dose: 3600/6300 cGy Fractions:       Chemotherapy  Chemo concurrent with radx?: No    Subjective:   Doing okay.   Slightly worse hoarseness.  No respiratory distress.  Noticing more pain with swallowing.  Tolerating p.o but limited due to pain.  Mild xerostomia. No taste change.    Nursing ROS:   Nutrition Alteration  Diet Type: Patient's Preference  Skin  Skin Reaction: 0 - No changes     ENT and Mouth Exam  ENT/Mouth Note: hoarse voice, not new, swallow ok  Cardiovascular  Respiratory effort: 1 - Normal - without distress  Gastrointestinal  GI Note: no gi issues        Pain Assessment  Pain Note: no pain issues      Objective:   /75   Pulse 71   Wt 104.8 kg (231 lb)   BMI 33.15 kg/m    NAD  + Hoarse  No respiratory distress    Labs:  CBC RESULTS:   Recent Labs   Lab Test 23  1659   WBC 9.6   RBC 6.11*   HGB 18.2*   HCT 54.4*   MCV 89   MCH 29.8   MCHC 33.5   RDW 12.6        ELECTROLYTES:  Recent Labs   Lab Test 23  1447 23  1437   NA  --  136   POTASSIUM  --  4.9   CHLORIDE  --  99   JORGE  --  9.8   CO2  --  24   BUN  --  31.9*   CR 1.6* 1.73*   GLC  --  345*       Assessment:  Mr. 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  He is undergoing definitive radiation therapy.    Tolerating radiation therapy well.  All questions and concerns addressed.    Plan:   Continue current therapy.    Xerostomia.  Salt and soda rinses as needed.  Hydration.  Radiation Esophagitis.  Magic mouthwash as needed.  Cancer related pain.   Oxycodone oral solution as needed.        Mosaiq chart and setup information reviewed  Ports checked    Medication Review  Med list reviewed with patient?: Yes           Joby Mari MD

## 2024-03-13 NOTE — LETTER
3/13/2024         RE: Jean Claude Sneed  [address] [facility] [address]        Dear Colleague,    Thank you for referring your patient, Jean Claude Sneed, to the  [facility]. Please see a copy of my visit note below.    [facility]  SPECIALIZING IN BREAKTHROUGHS  Radiation Oncology    On Treatment Visit Note      Jean Claude Sneed      Date: 3/13/2024   MRN: [id number]   : 1955  Diagnosis: SCC right vocal cord/glottis      Reason for Visit:  On Radiation Treatment Visit     Treatment Summary to Date  Treatment Site: head/neck Current Dose: 3600/6300 cGy Fractions:       Chemotherapy  Chemo concurrent with radx?: No    Subjective:   Doing okay.   Slightly worse hoarseness.  No respiratory distress.  Noticing more pain with swallowing.  Tolerating p.o but limited due to pain.  Mild xerostomia. No taste change.    Nursing ROS:   Nutrition Alteration  Diet Type: Patient's Preference  Skin  Skin Reaction: 0 - No changes     ENT and Mouth Exam  ENT/Mouth Note: hoarse voice, not new, swallow ok  Cardiovascular  Respiratory effort: 1 - Normal - without distress  Gastrointestinal  GI Note: no gi issues        Pain Assessment  Pain Note: no pain issues      Objective:   /75   Pulse 71   Wt 104.8 kg (231 lb)   BMI 33.15 kg/m    NAD  + Hoarse  No respiratory distress    Labs:  CBC RESULTS:   Recent Labs   Lab Test 23  1659   WBC 9.6   RBC 6.11*   HGB 18.2*   HCT 54.4*   MCV 89   MCH 29.8   MCHC 33.5   RDW 12.6        ELECTROLYTES:  Recent Labs   Lab Test 23  1447 23  1437   NA  --  136   POTASSIUM  --  4.9   CHLORIDE  --  99   JORGE  --  9.8   CO2  --  24   BUN  --  31.9*   CR 1.6* 1.73*   GLC  --  345*       Assessment:  Mr. 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  He is undergoing definitive radiation  therapy.    Tolerating radiation therapy well.  All questions and concerns addressed.    Plan:   Continue current therapy.    Xerostomia.  Salt and soda rinses as needed.  Hydration.  Radiation Esophagitis.  Magic mouthwash as needed.  Cancer related pain.  Oxycodone oral solution as needed.        Mosaiq chart and setup information reviewed  Ports checked    Medication Review  Med list reviewed with patient?: Yes           Joby Mari MD

## 2024-03-14 ENCOUNTER — APPOINTMENT (OUTPATIENT)
Dept: RADIATION THERAPY | Facility: OUTPATIENT CENTER | Age: 69
End: 2024-03-14

## 2024-03-15 ENCOUNTER — APPOINTMENT (OUTPATIENT)
Dept: RADIATION THERAPY | Facility: OUTPATIENT CENTER | Age: 69
End: 2024-03-15

## 2024-03-18 ENCOUNTER — APPOINTMENT (OUTPATIENT)
Dept: RADIATION THERAPY | Facility: OUTPATIENT CENTER | Age: 69
End: 2024-03-18

## 2024-03-19 ENCOUNTER — APPOINTMENT (OUTPATIENT)
Dept: RADIATION THERAPY | Facility: OUTPATIENT CENTER | Age: 69
End: 2024-03-19

## 2024-03-20 ENCOUNTER — OFFICE VISIT (OUTPATIENT)
Dept: RADIATION THERAPY | Facility: OUTPATIENT CENTER | Age: 69
End: 2024-03-20

## 2024-03-20 ENCOUNTER — APPOINTMENT (OUTPATIENT)
Dept: RADIATION THERAPY | Facility: OUTPATIENT CENTER | Age: 69
End: 2024-03-20

## 2024-03-20 VITALS
DIASTOLIC BLOOD PRESSURE: 91 MMHG | WEIGHT: 225.4 LBS | HEART RATE: 78 BPM | SYSTOLIC BLOOD PRESSURE: 145 MMHG | BODY MASS INDEX: 32.34 KG/M2

## 2024-03-20 DIAGNOSIS — C32.0 SQUAMOUS CELL CARCINOMA OF RIGHT VOCAL CORD (H): Primary | ICD-10-CM

## 2024-03-20 NOTE — LETTER
3/20/2024         RE: Jean Claude Sneed  [address] [facility] [address]        Dear Colleague,    Thank you for referring your patient, Jean Claude Sneed, to the  [facility]. Please see a copy of my visit note below.    [facility]  SPECIALIZING IN BREAKTHROUGHS  Radiation Oncology    On Treatment Visit Note      Jean Claude Sneed      Date: 3/20/2024   MRN: [id number]   : 1955  Diagnosis: SCC right vocal cord/glottis      Reason for Visit:  On Radiation Treatment Visit     Treatment Summary to Date  Treatment Site: head/neck Current Dose: 4725/6300 cGy Fractions:       Chemotherapy  Chemo concurrent with radx?: No    Subjective:   Doing okay.   Slightly worse hoarseness.  No respiratory distress.  Noticing more pain with swallowing.  Tolerating p.o but limited due to pain.  Mild xerostomia. No taste change.    Nursing ROS:   Nutrition Alteration  Diet Type: Patient's Preference  Skin  Skin Reaction: 0 - No changes     ENT and Mouth Exam  ENT/Mouth Note: hoarse voice, not new, swallow ok  Cardiovascular  Respiratory effort: 1 - Normal - without distress  Gastrointestinal  GI Note: no gi issues        Pain Assessment  Pain Note: no pain issues      Objective:   BP (!) 145/91   Pulse 78   Wt 102.2 kg (225 lb 6.4 oz)   BMI 32.34 kg/m    NAD  + Hoarse  No respiratory distress    Labs:  CBC RESULTS:   Recent Labs   Lab Test 23  1659   WBC 9.6   RBC 6.11*   HGB 18.2*   HCT 54.4*   MCV 89   MCH 29.8   MCHC 33.5   RDW 12.6        ELECTROLYTES:  Recent Labs   Lab Test 23  1447 23  1437   NA  --  136   POTASSIUM  --  4.9   CHLORIDE  --  99   JORGE  --  9.8   CO2  --  24   BUN  --  31.9*   CR 1.6* 1.73*   GLC  --  345*       Assessment:  Mr. 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  He is undergoing definitive radiation  therapy.    Tolerating radiation therapy well.  All questions and concerns addressed.    Plan:   Continue current therapy.    Xerostomia.  Salt and soda rinses as needed.  Hydration.  Radiation Esophagitis.  Magic mouthwash as needed.  Cancer related pain.  Oxycodone oral solution as needed.        Mosaiq chart and setup information reviewed  Ports checked    Medication Review  Med list reviewed with patient?: Yes           Joby Mari MD

## 2024-03-20 NOTE — PROGRESS NOTES
[facility]  SPECIALIZING IN BREAKTHROUGHS  Radiation Oncology    On Treatment Visit Note      Jean Claude Sneed      Date: 3/20/2024   MRN: [id number]   : 1955  Diagnosis: SCC right vocal cord/glottis      Reason for Visit:  On Radiation Treatment Visit     Treatment Summary to Date  Treatment Site: head/neck Current Dose: 4725/6300 cGy Fractions:       Chemotherapy  Chemo concurrent with radx?: No    Subjective:   Doing okay.   Slightly worse hoarseness.  No respiratory distress.  Noticing more pain with swallowing.  Tolerating p.o but limited due to pain.  Mild xerostomia. No taste change.    Nursing ROS:   Nutrition Alteration  Diet Type: Patient's Preference  Skin  Skin Reaction: 0 - No changes     ENT and Mouth Exam  ENT/Mouth Note: hoarse voice, not new, swallow ok  Cardiovascular  Respiratory effort: 1 - Normal - without distress  Gastrointestinal  GI Note: no gi issues        Pain Assessment  Pain Note: no pain issues      Objective:   BP (!) 145/91   Pulse 78   Wt 102.2 kg (225 lb 6.4 oz)   BMI 32.34 kg/m    NAD  + Hoarse  No respiratory distress    Labs:  CBC RESULTS:   Recent Labs   Lab Test 23  1659   WBC 9.6   RBC 6.11*   HGB 18.2*   HCT 54.4*   MCV 89   MCH 29.8   MCHC 33.5   RDW 12.6        ELECTROLYTES:  Recent Labs   Lab Test 23  1447 23  1437   NA  --  136   POTASSIUM  --  4.9   CHLORIDE  --  99   JORGE  --  9.8   CO2  --  24   BUN  --  31.9*   CR 1.6* 1.73*   GLC  --  345*       Assessment:  Mr. 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  He is undergoing definitive radiation therapy.    Tolerating radiation therapy well.  All questions and concerns addressed.    Plan:   Continue current therapy.    Xerostomia.  Salt and soda rinses as needed.  Hydration.  Radiation Esophagitis.  Magic mouthwash as needed.  Cancer related  pain.  Oxycodone oral solution as needed.        Mosaiq chart and setup information reviewed  Ports checked    Medication Review  Med list reviewed with patient?: Yes           Joby Mari MD

## 2024-03-21 ENCOUNTER — APPOINTMENT (OUTPATIENT)
Dept: RADIATION THERAPY | Facility: OUTPATIENT CENTER | Age: 69
End: 2024-03-21

## 2024-03-22 ENCOUNTER — APPOINTMENT (OUTPATIENT)
Dept: RADIATION THERAPY | Facility: OUTPATIENT CENTER | Age: 69
End: 2024-03-22

## 2024-03-25 ENCOUNTER — APPOINTMENT (OUTPATIENT)
Dept: RADIATION THERAPY | Facility: OUTPATIENT CENTER | Age: 69
End: 2024-03-25
Payer: COMMERCIAL

## 2024-03-25 ENCOUNTER — THERAPY VISIT (OUTPATIENT)
Dept: SPEECH THERAPY | Facility: CLINIC | Age: 69
End: 2024-03-25
Attending: GENERAL PRACTICE
Payer: COMMERCIAL

## 2024-03-25 DIAGNOSIS — G89.3 CANCER RELATED PAIN: Primary | ICD-10-CM

## 2024-03-25 DIAGNOSIS — D09.8 CARCINOMA IN SITU OF OTHER SPECIFIED SITES: ICD-10-CM

## 2024-03-25 PROCEDURE — 92526 ORAL FUNCTION THERAPY: CPT | Mod: GN | Performed by: SPEECH-LANGUAGE PATHOLOGIST

## 2024-03-25 PROCEDURE — 92610 EVALUATE SWALLOWING FUNCTION: CPT | Mod: GN | Performed by: SPEECH-LANGUAGE PATHOLOGIST

## 2024-03-25 RX ORDER — OXYCODONE HCL 5 MG/5 ML
5 SOLUTION, ORAL ORAL EVERY 6 HOURS PRN
Qty: 300 ML | Refills: 0 | Status: SHIPPED | OUTPATIENT
Start: 2024-03-25 | End: 2024-04-15

## 2024-03-25 NOTE — PROGRESS NOTES
SPEECH LANGUAGE PATHOLOGY EVALUATION    See electronic medical record for Abuse and Falls Screening details.    Subjective      Presenting condition or subjective complaint: speech  Date of onset: 03/13/24 (order's date)    Relevant medical history: Asthma; Cancer; COPD; Diabetes; Hearing problems; High blood pressure; Kidney disease; Overweight; Radiation treatment; Vision problems   Dates & types of surgery:        Pt reports is following up with ST per MD recommendations. He reports more concern for voice changes than for swallowing. His voice is currently an 8/10 (10 being normal) per his report. He feels he is still able to tolerate a soft and bite sized diet and thin liquids though it is painful. He reports his chewing has been functional despite no teeth.     Pt denies any symptoms of dysphagia other than pain.     Prior diagnostic imaging/testing results:       Prior therapy history for the same diagnosis, illness or injury: No      Living Environment  Social support: With family members   Help at home: None  Equipment owned:       Employment: No    Hobbies/Interests:      Patient goals for therapy: talk better    Pain assessment: Pain denied  Throat feels dry     Objective     SWALLOW EVALUTION  Dysphagia history: none prior to diagnosis  Current Diet/Method of Nutritional Intake: thin liquids (level 0), soft & bite-sized (level 6)        CLINICAL SWALLOW EVALUATION  Oral Motor Function: Dentition: edentulous, does not have dentures         ESOPHAGEAL PHASE OF SWALLOW  no observed or reported concerns related to esophageal function     SWALLOW ASSESSMENT CLINICAL IMPRESSIONS AND RATIONALE  Diet Consistency Recommendations: thin liquids (level 0), soft & bite-sized (level 6)    Recommended Feeding/Eating Techniques: small bolus size, alternate food and liquid intake   Medication Administration Recommendations: as tolerated  Instrumental Assessment Recommendations: instrumental evaluation not recommended at  this time     Assessment & Plan   CLINICAL IMPRESSIONS   Medical Diagnosis: 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 The following significant findings have been identified:  pain with swallowing  and voice changes. Identified deficits interfere with their ability to consume an oral diet as compared to previous level of function.    PLAN OF CARE  Treatment Interventions: Swallowing dysfunction and/or oral function for feeding, as needed. Pt reports currently the only barrier to intake is pain.    Prognosis to achieve stated therapy goals is good   Rehab potential is impacted by: comorbidities    Long Term Goals:   SLP Goal 1  Goal Identifier: PSE  Goal Description: Pt will complete pharyngeal strengthening exercises with min cues.  Rationale: To maximize safety, ease and/or independence of oral intake  Goal Progress: Handout and education provided regarding PSE. Pt reports no questions.  Target Date: 04/24/24  SLP Goal 2  Goal Identifier: Soft and bite sized  Goal Description: Pt will tolerate a soft and bite sized diet with thin liquids throughout radiation utilizing safe swallow strategies.  Rationale: To maximize safety, ease and/or independence of oral intake  Goal Progress: Pt continues to tolerate soft and bite sized solids and thin liquids with no overt s/s of aspiration.  Target Date: 04/24/24  SLP Goal 3  Goal Identifier: Aspiration Symptoms  Goal Description: Pt will verbalize understanding of s/s of aspiration and risks.  Rationale: To maximize safety, ease and/or independence of oral intake  Goal Progress: Pt reports understanding of s/s of aspiration and risks after education.  Target Date: 04/24/24      Frequency of Treatment: 6 visits as needed  Duration of Treatment: 12 weeks     Recommended Referrals to Other Professionals:   Education  Assessment:        Risks and benefits of evaluation/treatment have been explained.   Patient/Family/caregiver agrees with Plan of Care.     Evaluation Time:    SLP Eval: oral/pharyngeal swallow function, clinical minutes (47577): 20         Signing Clinician: Lee Ann Ledezma SLP

## 2024-03-26 ENCOUNTER — APPOINTMENT (OUTPATIENT)
Dept: RADIATION THERAPY | Facility: OUTPATIENT CENTER | Age: 69
End: 2024-03-26
Payer: COMMERCIAL

## 2024-03-27 ENCOUNTER — APPOINTMENT (OUTPATIENT)
Dept: RADIATION THERAPY | Facility: OUTPATIENT CENTER | Age: 69
End: 2024-03-27
Payer: COMMERCIAL

## 2024-03-27 ENCOUNTER — OFFICE VISIT (OUTPATIENT)
Dept: RADIATION THERAPY | Facility: OUTPATIENT CENTER | Age: 69
End: 2024-03-27
Payer: COMMERCIAL

## 2024-03-27 VITALS
WEIGHT: 220 LBS | DIASTOLIC BLOOD PRESSURE: 99 MMHG | BODY MASS INDEX: 31.57 KG/M2 | HEART RATE: 69 BPM | SYSTOLIC BLOOD PRESSURE: 142 MMHG

## 2024-03-27 DIAGNOSIS — C32.0 SQUAMOUS CELL CARCINOMA OF RIGHT VOCAL CORD (H): Primary | ICD-10-CM

## 2024-03-27 NOTE — PROGRESS NOTES
[facility]  SPECIALIZING IN BREAKTHROUGHS  Radiation Oncology    On Treatment Visit Note      Jean Claude Sneed      Date: 3/27/2024   MRN: [id number]   : 1955  Diagnosis: SCC right vocal cord/glottis      Reason for Visit:  On Radiation Treatment Visit     Treatment Summary to Date  Treatment Site: head/neck Current Dose: 5850/6300 cGy Fractions:       Chemotherapy  Chemo concurrent with radx?: No    Subjective:   Doing okay.   Slightly worse hoarseness.  No respiratory distress.  Noticing more pain with swallowing.  Tolerating p.o but slightly limited due to pain.  Using Magic mouthwash and oxycodone as needed with alleviation.  Mild xerostomia.  Minimal taste change.    Nursing ROS:   Nutrition Alteration  Diet Type: Patient's Preference  Skin  Skin Reaction: 0 - No changes     ENT and Mouth Exam  ENT/Mouth Note: hoarse voice, not new, swallow ok  Cardiovascular  Respiratory effort: 1 - Normal - without distress  Gastrointestinal  GI Note: no gi issues        Pain Assessment  Pain Note: no pain issues      Objective:   BP (!) 142/99   Pulse 69   Wt 99.8 kg (220 lb)   BMI 31.57 kg/m    NAD  + Hoarse  No respiratory distress  Skin with erythema without desquamation    Labs:  CBC RESULTS:   Recent Labs   Lab Test 23  1659   WBC 9.6   RBC 6.11*   HGB 18.2*   HCT 54.4*   MCV 89   MCH 29.8   MCHC 33.5   RDW 12.6        ELECTROLYTES:  Recent Labs   Lab Test 23  1447 23  1437   NA  --  136   POTASSIUM  --  4.9   CHLORIDE  --  99   JORGE  --  9.8   CO2  --  24   BUN  --  31.9*   CR 1.6* 1.73*   GLC  --  345*       Assessment:  Mr. 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  He is undergoing definitive radiation therapy.    Tolerating radiation therapy well.  All questions and concerns addressed.    Plan:   Continue current therapy.  EOT this  Friday, return to clinic in 2 weeks.  Xerostomia.  Salt and soda rinses as needed.  Hydration.  Radiation Esophagitis.  Magic mouthwash as needed.  Cancer related pain.  Oxycodone oral solution as needed.        Mosaiq chart and setup information reviewed  Ports checked    Medication Review  Med list reviewed with patient?: Yes           Joby Mari MD

## 2024-03-27 NOTE — LETTER
3/27/2024         RE: Jean Claude Sneed  [address] [facility] [address]        Dear Colleague,    Thank you for referring your patient, Jean Claude Sneed, to the  [facility]. Please see a copy of my visit note below.    [facility]  SPECIALIZING IN BREAKTHROUGHS  Radiation Oncology    On Treatment Visit Note      Jean Claude Sneed      Date: 3/27/2024   MRN: [id number]   : 1955  Diagnosis: SCC right vocal cord/glottis      Reason for Visit:  On Radiation Treatment Visit     Treatment Summary to Date  Treatment Site: head/neck Current Dose: 5850/6300 cGy Fractions:       Chemotherapy  Chemo concurrent with radx?: No    Subjective:   Doing okay.   Slightly worse hoarseness.  No respiratory distress.  Noticing more pain with swallowing.  Tolerating p.o but slightly limited due to pain.  Using Magic mouthwash and oxycodone as needed with alleviation.  Mild xerostomia.  Minimal taste change.    Nursing ROS:   Nutrition Alteration  Diet Type: Patient's Preference  Skin  Skin Reaction: 0 - No changes     ENT and Mouth Exam  ENT/Mouth Note: hoarse voice, not new, swallow ok  Cardiovascular  Respiratory effort: 1 - Normal - without distress  Gastrointestinal  GI Note: no gi issues        Pain Assessment  Pain Note: no pain issues      Objective:   BP (!) 142/99   Pulse 69   Wt 99.8 kg (220 lb)   BMI 31.57 kg/m    NAD  + Hoarse  No respiratory distress  Skin with erythema without desquamation    Labs:  CBC RESULTS:   Recent Labs   Lab Test 23  1659   WBC 9.6   RBC 6.11*   HGB 18.2*   HCT 54.4*   MCV 89   MCH 29.8   MCHC 33.5   RDW 12.6        ELECTROLYTES:  Recent Labs   Lab Test 23  1447 23  1437   NA  --  136   POTASSIUM  --  4.9   CHLORIDE  --  99   JORGE  --  9.8   CO2  --  24   BUN  --  31.9*   CR 1.6* 1.73*   GLC  --  345*       Assessment:  Mr. 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  He is undergoing definitive radiation therapy.    Tolerating radiation therapy well.  All questions and concerns addressed.    Plan:   Continue current therapy.  EOT this Friday, return to clinic in 2 weeks.  Xerostomia.  Salt and soda rinses as needed.  Hydration.  Radiation Esophagitis.  Magic mouthwash as needed.  Cancer related pain.  Oxycodone oral solution as needed.        Mosaiq chart and setup information reviewed  Ports checked    Medication Review  Med list reviewed with patient?: Yes           Joby Mari MD

## 2024-03-28 ENCOUNTER — APPOINTMENT (OUTPATIENT)
Dept: RADIATION THERAPY | Facility: OUTPATIENT CENTER | Age: 69
End: 2024-03-28
Payer: COMMERCIAL

## 2024-03-29 ENCOUNTER — APPOINTMENT (OUTPATIENT)
Dept: RADIATION THERAPY | Facility: OUTPATIENT CENTER | Age: 69
End: 2024-03-29
Payer: COMMERCIAL

## 2024-04-09 ENCOUNTER — DOCUMENTATION ONLY (OUTPATIENT)
Dept: RADIATION THERAPY | Facility: OUTPATIENT CENTER | Age: 69
End: 2024-04-09

## 2024-04-09 DIAGNOSIS — T66.XXXA RADIATION ESOPHAGITIS: ICD-10-CM

## 2024-04-09 DIAGNOSIS — K20.80 RADIATION ESOPHAGITIS: Primary | ICD-10-CM

## 2024-04-09 DIAGNOSIS — K20.80 RADIATION ESOPHAGITIS: ICD-10-CM

## 2024-04-09 DIAGNOSIS — T66.XXXA RADIATION ESOPHAGITIS: Primary | ICD-10-CM

## 2024-04-09 NOTE — PROGRESS NOTES
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 Pathology demonstrated 7 mm squamous cell carcinoma, well-differentiated, positive margin.                                STAGE: T1N0M0  CONCURRENT SYSTEMIC THERAPY:  No       ONCOLOGIC HISTORY: Mr. 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     The patient initially presented after referral from audiology department for evaluation of mixed hearing loss.  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  The patient denies any hoarseness at the time.  Cords were noted to be mobile.  On 2023 the patient underwent direct laryngoscopy with excisional biopsy of the right vocal cord lesion.  Findings intraoperatively demonstrated plaque-like lesion along the right true vocal cord and less prominent leukoplakia on the left side.  Right true vocal cord lesion was excised.  Pathology demonstrated 7 mm squamous cell carcinoma, well-differentiated, positive margin.  Evaluation of the left vocal cord region did not demonstrate cancer, but did demonstrate mild dysplasia.  The patient subsequently underwent microlaryngoscopy on 2023 by Dr. Juan.  Findings intraoperatively demonstrated an exophytic lesion along the left anterior cord that is changed compared to prior evaluation.  No obvious lesion is noted on the right side.  There was noted a small area of  exophytic tissue anteriorly on the right side.  However Dr. Carias elected to focus, and left side which had changed since prior diagnosis.  The left side there was noted to be an exophytic lesion along the left anterior cord.  Left vocal cord lesions were obtained which demonstrated high-grade squamous dysplasia without clear evidence of invasive disease.  Reexcision of the right vocal cord demonstrated high-grade squamous cell dysplasia.  Multiple areas were sampled and appears to be that there were presence of at least severe dysplasia although the left side and possibility of skip lesions.  The patient was subsequently referred to our clinic to discuss potential role of radiation therapy. Given extent of disease recommend consideration of proceeding with definitive radiation therapy. Tentatively will plan to treat the larynx to 63 [address] in 28 fractions. In the interval, the patient underwent CT scan of the neck.  Imaging did not demonstrate any evidence of regional adenopathy.  No clear glottic lesion was noted. CT  did demonstrate an indeterminate lesion in the right tonsillar region. recommend reevaluation by ENT team to evaluate indeterminate lesion of the right tonsillar region. In the interval, the patient underwent right tonsillectomy on 1/31/2024 by Dr Flora Juan to evaluate for indeterminate change noted on CT scan. Pathology was negative for malignancy.     SITE OF TREATMENT: Larynx    DATES  OF TREATMENT: 2/21/24 to 3/29/24     TOTAL DOSE OF TREATMENT: 6,300 cGy    DOSE PER FRACTION OF TREATMENT: 225 x 28 fractions       COMMENT/TOXICITY:               Xerostomia.  Salt and soda rinses as needed.  Hydration.  Radiation Esophagitis.  Magic mouthwash as needed.    PAIN MANAGEMENT:   Oxycodone oral solution prn                         FOLLOW UP PLAN: 2 weeks    CC  Patient Care Team:  [facility] as PCP - General  Joby Mari MD as MD (Radiation Oncology)  Joby Mari MD as  Assigned Cancer Care Provider  Flora Juan (Otolaryngology)       JOSE MARIA Clements  Department of Radiation Oncology  [facility]

## 2024-04-15 ENCOUNTER — OFFICE VISIT (OUTPATIENT)
Dept: RADIATION THERAPY | Facility: OUTPATIENT CENTER | Age: 69
End: 2024-04-15
Payer: COMMERCIAL

## 2024-04-15 VITALS
HEART RATE: 58 BPM | BODY MASS INDEX: 30.65 KG/M2 | RESPIRATION RATE: 18 BRPM | OXYGEN SATURATION: 94 % | WEIGHT: 213.6 LBS | DIASTOLIC BLOOD PRESSURE: 85 MMHG | SYSTOLIC BLOOD PRESSURE: 156 MMHG

## 2024-04-15 DIAGNOSIS — G89.3 CANCER RELATED PAIN: ICD-10-CM

## 2024-04-15 RX ORDER — OXYCODONE HCL 5 MG/5 ML
5 SOLUTION, ORAL ORAL EVERY 6 HOURS PRN
Qty: 300 ML | Refills: 0 | Status: SHIPPED | OUTPATIENT
Start: 2024-04-15 | End: 2024-06-06

## 2024-04-15 ASSESSMENT — PAIN SCALES - GENERAL: PAINLEVEL: MODERATE PAIN (4)

## 2024-04-15 NOTE — PROGRESS NOTES
Radiation Oncology Progress Note    HPI: Mr. 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  He underwent definitive radiation therapy.     Radiation Treatment  2/21/24 to 3/29/24: Larynx, 6300 cGy in 28 fractions    Patient returns for follow-up.    Is overall doing okay.  Posttreatment did notice worsening of esophagitis.  This has now started to improve.  Tolerating softer foods orally and supplementing with shakes.  Using oxycodone as needed.  Magic mouthwash as needed.  Narcotic use has started to decrease.  Having thickened saliva from xerostomia.  Using salt and soda rinses.  Have discussed Mucinex in the past as well.  Stable to improved hoarseness.  No restrictions.    BP (!) 156/85   Pulse 58   Resp 18   Wt 96.9 kg (213 lb 9.6 oz)   SpO2 94%   BMI 30.65 kg/m    NAD  + Hoarseness   No respiratory distress  Skin with erythema without desquamation    Plan:  Appropriate recovery post completion of radiation treatment as far.  Anticipate further recovery with time.  Xerostomia.  Salt and soda rinses as needed.  Hydration.  Add Mucinex to thin secretions.  Consider XyliMelts and All Day spray.  Radiation Esophagitis.  Magic mouthwash as needed.  Cancer related pain.  Oxycodone oral solution as needed.  Nutrition.  Advance diet orally as tolerated.  RTC in 2 weeks.    Joby Mari M.D.  Department of Radiation Oncology  [facility]

## 2024-04-15 NOTE — LETTER
4/15/2024         RE: Jean Claude Sneed  [address] [facility] [address]        Dear Colleague,    Thank you for referring your patient, Jean Claude Sneed, to the [facility] RADIATION THERAPY CLINIC. Please see a copy of my visit note below.    Radiation Oncology Progress Note    HPI: Mr. 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  He underwent definitive radiation therapy.     Radiation Treatment  2/21/24 to 3/29/24: Larynx, 6300 cGy in 28 fractions    Patient returns for follow-up.    Is overall doing okay.  Posttreatment did notice worsening of esophagitis.  This has now started to improve.  Tolerating softer foods orally and supplementing with shakes.  Using oxycodone as needed.  Magic mouthwash as needed.  Narcotic use has started to decrease.  Having thickened saliva from xerostomia.  Using salt and soda rinses.  Have discussed Mucinex in the past as well.  Stable to improved hoarseness.  No restrictions.    BP (!) 156/85   Pulse 58   Resp 18   Wt 96.9 kg (213 lb 9.6 oz)   SpO2 94%   BMI 30.65 kg/m    NAD  + Hoarseness   No respiratory distress  Skin with erythema without desquamation    Plan:  Appropriate recovery post completion of radiation treatment as far.  Anticipate further recovery with time.  Xerostomia.  Salt and soda rinses as needed.  Hydration.  Add Mucinex to thin secretions.  Consider XyliMelts and All Day spray.  Radiation Esophagitis.  Magic mouthwash as needed.  Cancer related pain.  Oxycodone oral solution as needed.  Nutrition.  Advance diet orally as tolerated.  RTC in 2 weeks.    Joby Mari M.D.  Department of Radiation Oncology  [facility]

## 2024-04-15 NOTE — NURSING NOTE
"FOLLOW-UP VISIT    Patient Name: Jean Claude Sneed      : 1955     Age: 69 year old        ______________________________________________________________________________     Chief Complaint   Patient presents with    Radiation Therapy     return     BP (!) 156/85   Pulse 58   Resp 18   Wt 96.9 kg (213 lb 9.6 oz)   SpO2 94%   BMI 30.65 kg/m       Date Radiation Completed: 3/29/24    Pain  Current history of pain associated with this visit:   Intensity: 4/10  Current: aching with swallowing  Location: throat/back of throat  Treatment: oxycodone prn, magic mouthwash prn.  He was using these 4x/day during radiation.  He notes the pain is getting less each day, now using about 3x/day and pain less severe.    Meds  Current Med List Reviewed: Yes  Medication Note: cardiologist discontinued plavix    Labs  TSH   Date Value Ref Range Status   06/10/2019 2.18 0.35 - 4.94 uIU/mL Final   ]    Imaging  None    Skin:Warm  Dry  Intact  Oral Products: water  Mucositis: no  Dry mouth: Yes: keeps water handy  Dental evaluation: No  PEG tube: No  Speech therapy: No  Lymphedema: No  Energy Level:good.  \"I used to take naps everyday during radiation, now I don't need to\"  Appetite: improving  Intake: chicken and mashed potatoes last night, fish the night before  Weight:    Wt Readings from Last 5 Encounters:   04/15/24 96.9 kg (213 lb 9.6 oz)   24 99.8 kg (220 lb)   24 102.2 kg (225 lb 6.4 oz)   24 104.8 kg (231 lb)   24 110.2 kg (243 lb)       Appointments:     DATE  Oncologist: SUNNY    ENT:  Dr. Flora Juan, [facility]  We will reach out to alert them he completed radiation   Primary:      Other Notes:   "

## 2024-04-30 ENCOUNTER — OFFICE VISIT (OUTPATIENT)
Dept: RADIATION THERAPY | Facility: OUTPATIENT CENTER | Age: 69
End: 2024-04-30
Payer: COMMERCIAL

## 2024-04-30 VITALS
BODY MASS INDEX: 31.57 KG/M2 | SYSTOLIC BLOOD PRESSURE: 140 MMHG | OXYGEN SATURATION: 92 % | RESPIRATION RATE: 12 BRPM | DIASTOLIC BLOOD PRESSURE: 75 MMHG | HEART RATE: 53 BPM | WEIGHT: 220 LBS

## 2024-04-30 DIAGNOSIS — C32.0 SQUAMOUS CELL CARCINOMA OF RIGHT VOCAL CORD (H): Primary | ICD-10-CM

## 2024-04-30 NOTE — LETTER
4/30/2024         RE: Jean Claude Sneed  [address]  [id number]        Dear Colleague,    Thank you for referring your patient, Jean Claude Sneed, to the [facility] RADIATION THERAPY CLINIC. Please see a copy of my visit note below.    Radiation Oncology Progress Note    HPI: Mr. 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  He underwent definitive radiation therapy.     Radiation Treatment  2/21/24 to 3/29/24: Larynx, 6300 cGy in 28 fractions    Patient returns for follow-up.    Is overall doing okay.  Posttreatment did notice worsening of esophagitis.  This has now continue to improve.  Tolerating almost all foods orally and supplementing with shakes.  Using oxycodone as needed, but less frequently.  Magic mouthwash as needed.   Having thickened saliva from xerostomia but this is improving.  Using salt and soda rinses.  Have discussed Mucinex in the past as well.  Stable to improved hoarseness.  No restrictions.    BP (!) 140/75 (BP Location: Left arm, Patient Position: Sitting, Cuff Size: Adult Regular)   Pulse 53   Resp 12   Wt 99.8 kg (220 lb)   SpO2 92%   BMI 31.57 kg/m    NAD  + Hoarseness (improving)  No respiratory distress  Skin with mild erythema without desquamation    Plan:  Appropriate recovery post completion of radiation treatment as far.  Anticipate further recovery with time.  Xerostomia.  Salt and soda rinses as needed.  Hydration.  Add Mucinex to thin secretions.  Consider XyliMelts and All Day spray.  Radiation Esophagitis.  Improving. Magic mouthwash as needed.  Cancer related pain.  Oxycodone oral solution as needed.  Nutrition.  Advance diet orally as tolerated.  Will reach out to ENT team for post treatment follow up and oncologic surveillance.   RTC in 4 weeks.    Joby Mari M.D.  Department of Radiation Oncology  [facility]

## 2024-04-30 NOTE — NURSING NOTE
"FOLLOW-UP VISIT    Patient Name: Jean Claude Sneed      : 1955     Age: 69 year old        ______________________________________________________________________________     Chief Complaint   Patient presents with    Radiation Therapy     Return with Dr. Mari     BP (!) 140/75 (BP Location: Left arm, Patient Position: Sitting, Cuff Size: Adult Regular)   Pulse 53   Resp 12   Wt 99.8 kg (220 lb)   SpO2 92%   BMI 31.57 kg/m       Date Radiation Completed: 3/29/24    Pain  Current history of pain associated with this visit:   Intensity: occasionally 1/10 - \"pain is pretty much gone, I'm feeling so much better\"  Current: tickle/itch, sore  Location: esophagus/back of throat - area of radiation treatment  Treatment: prn Magic Mouthwash, prn oxycodone - was previously using both 3-4x/day, now using maybe 1-2x/day.  Doesn't need any refills    Meds  Current Med List Reviewed: Yes  Medication Note:     Labs  TSH   Date Value Ref Range Status   06/10/2019 2.18 0.35 - 4.94 uIU/mL Final   ]    Imaging  None    Skin:Warm  Dry  Intact  Oral Products: salt/soda, magic mouthwash  Mucositis: none  Dry mouth: No  Dental evaluation: edentulous, also prefers not to wear dentures   PEG tube: No  Speech therapy: No  Lymphedema: No  Energy Level:improving  Appetite: normal  Intake: normal - \" I can eat hamburgers and pretty much any texture. Some things taste normal, some things still don't have flavor\"  Weight:    Wt Readings from Last 5 Encounters:   24 99.8 kg (220 lb)   04/15/24 96.9 kg (213 lb 9.6 oz)   24 99.8 kg (220 lb)   24 102.2 kg (225 lb 6.4 oz)   24 104.8 kg (231 lb)       Appointments:     DATE  Oncologist: SUNNY     ENT:  Dr Flora Juan   Will need a return in about 2-3 weeks   Primary:      Other Notes:   "

## 2024-04-30 NOTE — PROGRESS NOTES
Radiation Oncology Progress Note    HPI: Mr. 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  He underwent definitive radiation therapy.     Radiation Treatment  2/21/24 to 3/29/24: Larynx, 6300 cGy in 28 fractions    Patient returns for follow-up.    Is overall doing okay.  Posttreatment did notice worsening of esophagitis.  This has now continue to improve.  Tolerating almost all foods orally and supplementing with shakes.  Using oxycodone as needed, but less frequently.  Magic mouthwash as needed.   Having thickened saliva from xerostomia but this is improving.  Using salt and soda rinses.  Have discussed Mucinex in the past as well.  Stable to improved hoarseness.  No restrictions.    BP (!) 140/75 (BP Location: Left arm, Patient Position: Sitting, Cuff Size: Adult Regular)   Pulse 53   Resp 12   Wt 99.8 kg (220 lb)   SpO2 92%   BMI 31.57 kg/m    NAD  + Hoarseness (improving)  No respiratory distress  Skin with mild erythema without desquamation    Plan:  Appropriate recovery post completion of radiation treatment as far.  Anticipate further recovery with time.  Xerostomia.  Salt and soda rinses as needed.  Hydration.  Add Mucinex to thin secretions.  Consider XyliMelts and All Day spray.  Radiation Esophagitis.  Improving. Magic mouthwash as needed.  Cancer related pain.  Oxycodone oral solution as needed.  Nutrition.  Advance diet orally as tolerated.  Will reach out to ENT team for post treatment follow up and oncologic surveillance.   RTC in 4 weeks.    Joby Mari M.D.  Department of Radiation Oncology  [facility]

## 2024-05-13 ENCOUNTER — TRANSFERRED RECORDS (OUTPATIENT)
Dept: HEALTH INFORMATION MANAGEMENT | Facility: CLINIC | Age: 69
End: 2024-05-13

## 2024-06-05 NOTE — TELEPHONE ENCOUNTER
Please notify prescription sent to pharmacy for the 40 mg size pills. They will fill these when he calls them.   Refills are done for one year.     Abram Teague MD     36.8

## 2024-06-06 ENCOUNTER — OFFICE VISIT (OUTPATIENT)
Dept: RADIATION THERAPY | Facility: OUTPATIENT CENTER | Age: 69
End: 2024-06-06
Payer: COMMERCIAL

## 2024-06-06 VITALS
WEIGHT: 224.4 LBS | BODY MASS INDEX: 32.2 KG/M2 | DIASTOLIC BLOOD PRESSURE: 88 MMHG | OXYGEN SATURATION: 97 % | SYSTOLIC BLOOD PRESSURE: 163 MMHG | HEART RATE: 60 BPM | RESPIRATION RATE: 16 BRPM

## 2024-06-06 DIAGNOSIS — C32.0 SQUAMOUS CELL CARCINOMA OF RIGHT VOCAL CORD (H): Primary | ICD-10-CM

## 2024-06-06 ASSESSMENT — PAIN SCALES - GENERAL: PAINLEVEL: NO PAIN (0)

## 2024-06-06 NOTE — PROGRESS NOTES
Radiation Oncology Progress Note    HPI: Mr. 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  He underwent definitive radiation therapy.     Radiation Treatment  2/21/24 to 3/29/24: Larynx, 6300 cGy in 28 fractions    Patient returns for follow-up.    Following with Dr. Juan ENT team.  Scope on 5/13/2024 was IRAIDA.  There was noted erythema and edema in the treatment area consistent with post RT change.    Is overall doing okay. Tolerating almost all foods orally.  No longer needing oxycodone or magic mouthwash.  Having thickened saliva from xerostomia but this is improving.  Using salt and soda rinses intermittently. Has note improved hoarseness.        BP (!) 163/88 (BP Location: Left arm, Cuff Size: Adult Large)   Pulse 60   Resp 16   Wt 101.8 kg (224 lb 6.4 oz)   SpO2 97%   BMI 32.20 kg/m    NAD  + Hoarseness (continues to improve)  No respiratory distress  Skin with mild erythema without desquamation    Plan:   Clinically IRAIDA. Scope by ENT team on 5/13/2024 was IRAIDA.   Appropriate recovery post completion of radiation treatment as far.  Anticipate further recovery with time.  Xerostomia.  Salt and soda rinses as needed.  Hydration.  Mucinex to thin secretions PRN.  Consider XyliMelts and All Day spray.  Radiation Esophagitis.  Improved. Magic mouthwash as needed.  Cancer related pain.  No longer needing oxycodone oral solution as needed.  Nutrition.  Advance diet orally as tolerated.  Continue oncologic surveillance per ENT team (Dr. Juan).  RTC in 2-3 months.     Joby Mari M.D.  Department of Radiation Oncology  [facility]

## 2024-06-06 NOTE — NURSING NOTE
FOLLOW-UP VISIT    Patient Name: Jean Claude Sneed      : 1955     Age: 69 year old        ______________________________________________________________________________     Chief Complaint   Patient presents with    Radiation Therapy     Return visit, head/neck cancer       BP (!) 163/88 (BP Location: Left arm, Cuff Size: Adult Large)   Pulse 60   Resp 16   Wt 101.8 kg (224 lb 6.4 oz)   SpO2 97%   BMI 32.20 kg/m       Date Radiation Completed: 3/29/24    Pain  Denies    Meds  Current Med List Reviewed: Yes  Medication Note: only taking mucinex prn    Imaging  None    Skin: healed well, no issues  Oral Products: otc products  Mucositis: No  Dry mouth: No  Dental evaluation: N/A, edentulous   PEG tube: N/A  Speech therapy: No  Lymphedema: No  Energy Level:normal  Appetite: normal  Intake: normal, no issues, taste is recovering, no aspiration  Weight:    Wt Readings from Last 5 Encounters:   24 101.8 kg (224 lb 6.4 oz)   24 99.8 kg (220 lb)   04/15/24 96.9 kg (213 lb 9.6 oz)   24 99.8 kg (220 lb)   24 102.2 kg (225 lb 6.4 oz)       Appointments:     DATE  Oncologist:     ENT:     Primary:      Other Notes: seen recently by ENT, scope fine, follow up in 3 months

## 2024-06-06 NOTE — LETTER
6/6/2024      Jean Claude Sneed  [address] [place] [id number]      Dear Colleague,    Thank you for referring your patient, Jean Claude Sneed, to the [facility]ATION [facility]. Please see a copy of my visit note below.    Radiation Oncology Progress Note    HPI: Mr. 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  He underwent definitive radiation therapy.     Radiation Treatment  2/21/24 to 3/29/24: Larynx, 6300 cGy in 28 fractions    Patient returns for follow-up.    Following with Dr. Juan ENT team.  Scope on 5/13/2024 was [place].  There was noted erythema and edema in the treatment area consistent with post RT change.    Is overall doing okay. Tolerating almost all foods orally.  No longer needing oxycodone or magic mouthwash.  Having thickened saliva from xerostomia but this is improving.  Using salt and soda rinses intermittently. Has note improved hoarseness.        BP (!) 163/88 (BP Location: Left arm, Cuff Size: Adult Large)   Pulse 60   Resp 16   Wt 101.8 kg (224 lb 6.4 oz)   SpO2 97%   BMI 32.20 kg/m    NAD  + Hoarseness (continues to improve)  No respiratory distress  Skin with mild erythema without desquamation    Plan:   Clinically IRAIDA. Scope by ENT team on 5/13/2024 was IRAIDA.   Appropriate recovery post completion of radiation treatment as far.  Anticipate further recovery with time.  Xerostomia.  Salt and soda rinses as needed.  Hydration.  Mucinex to thin secretions PRN.  Consider XyliMelts and All Day spray.  Radiation Esophagitis.  Improved. Magic mouthwash as needed.  Cancer related pain.  No longer needing oxycodone oral solution as needed.  Nutrition.  Advance diet orally as tolerated.  Continue oncologic surveillance per ENT team (Dr. Juan).  RTC in 2-3 months.     Joby Mari M.D.  Department of Radiation Oncology  [facility]

## 2024-09-12 NOTE — PROGRESS NOTES
Speech Therapy:        DISCHARGE  Reason for Discharge: Patient has met all goals.    Equipment Issued:     Discharge Plan: Pt did not schedule follow up. Per chart is doing well. No further ST needs at this time.    Referring Provider:  Teddy Leung

## 2024-09-17 ENCOUNTER — TRANSFERRED RECORDS (OUTPATIENT)
Dept: HEALTH INFORMATION MANAGEMENT | Facility: CLINIC | Age: 69
End: 2024-09-17

## 2024-09-30 ENCOUNTER — OFFICE VISIT (OUTPATIENT)
Dept: RADIATION THERAPY | Facility: OUTPATIENT CENTER | Age: 69
End: 2024-09-30
Payer: COMMERCIAL

## 2024-09-30 VITALS
BODY MASS INDEX: 33.75 KG/M2 | HEART RATE: 58 BPM | DIASTOLIC BLOOD PRESSURE: 92 MMHG | WEIGHT: 235.2 LBS | SYSTOLIC BLOOD PRESSURE: 182 MMHG | RESPIRATION RATE: 12 BRPM | OXYGEN SATURATION: 95 %

## 2024-09-30 DIAGNOSIS — C32.0 SQUAMOUS CELL CARCINOMA OF RIGHT VOCAL CORD (H): Primary | ICD-10-CM

## 2024-09-30 ASSESSMENT — PAIN SCALES - GENERAL: PAINLEVEL: NO PAIN (0)

## 2024-09-30 NOTE — PROGRESS NOTES
Radiation Oncology Progress Note    HPI: Mr. 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  He underwent definitive radiation therapy.     Radiation Treatment  2/21/24 to 3/29/24: Larynx, 6300 cGy in 28 fractions    Patient returns for follow-up.    Following with Dr. Juan ENT team.  Scope on 9/17/24 was [place].      Is overall doing okay. Tolerating almost all foods orally.  No longer needing oxycodone or magic mouthwash.  Having thickened saliva from xerostomia but this continues to improve.  Using salt and soda rinses intermittently. Has note improved hoarseness.        BP (!) 182/92 (BP Location: Left arm, Patient Position: Sitting, Cuff Size: Adult Regular)   Pulse 58   Resp 12   Wt 106.7 kg (235 lb 3.2 oz)   SpO2 95%   BMI 33.75 kg/m    NAD  + Hoarseness (continues to improve)  No respiratory distress    Plan:   Clinically IRAIDA. Scope by ENT team on 9/17/2024 was IRAIDA.   Appropriate recovery post completion of radiation treatment as far.  Anticipate further recovery with time.  Xerostomia.  Salt and soda rinses as needed.  Hydration.  Mucinex to thin secretions PRN.  Consider XyliMelts and All Day spray.  Nutrition.  Advance diet orally as tolerated.  Continue oncologic surveillance per ENT team (Dr. Juan).  RTC in 3 months.     30 minutes spent on the date of the encounter doing chart review, review of outside records, review of test results, interpretation of tests, patient visit, documentation, discussion, and plan.      Joby Mari M.D.  Department of Radiation Oncology  [facility]

## 2024-09-30 NOTE — LETTER
9/30/2024      Jean Claude Sneed  [address] [facility] [address]      Dear Colleague,    Thank you for referring your patient, Jean Claude Sneed, to the [facility] RADIATION THERAPY CLINIC. Please see a copy of my visit note below.    Radiation Oncology Progress Note    HPI: Mr. 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  He underwent definitive radiation therapy.     Radiation Treatment  2/21/24 to 3/29/24: Larynx, 6300 cGy in 28 fractions    Patient returns for follow-up.    Following with Dr. Juan ENT team.  Scope on 9/17/24 was [place].      Is overall doing okay. Tolerating almost all foods orally.  No longer needing oxycodone or magic mouthwash.  Having thickened saliva from xerostomia but this continues to improve.  Using salt and soda rinses intermittently. Has note improved hoarseness.        BP (!) 182/92 (BP Location: Left arm, Patient Position: Sitting, Cuff Size: Adult Regular)   Pulse 58   Resp 12   Wt 106.7 kg (235 lb 3.2 oz)   SpO2 95%   BMI 33.75 kg/m    NAD  + Hoarseness (continues to improve)  No respiratory distress    Plan:   Clinically IRAIDA. Scope by ENT team on 9/17/2024 was IRAIDA.   Appropriate recovery post completion of radiation treatment as far.  Anticipate further recovery with time.  Xerostomia.  Salt and soda rinses as needed.  Hydration.  Mucinex to thin secretions PRN.  Consider XyliMelts and All Day spray.  Nutrition.  Advance diet orally as tolerated.  Continue oncologic surveillance per ENT team (Dr. Juan).  RTC in 3 months.     30 minutes spent on the date of the encounter doing chart review, review of outside records, review of test results, interpretation of tests, patient visit, documentation, discussion, and plan.      Joby Mari M.D.  Department of Radiation Oncology  [facility]       Again, thank you for allowing me to  participate in the care of your patient.        Sincerely,        Joby Mari MD

## 2024-09-30 NOTE — NURSING NOTE
"FOLLOW-UP VISIT    Patient Name: Jean Claude Sneed      : 1955     Age: 69 year old        ______________________________________________________________________________     Chief Complaint   Patient presents with    Radiation Therapy     Return with Dr. Mari     BP (!) 182/92 (BP Location: Left arm, Patient Position: Sitting, Cuff Size: Adult Regular)   Pulse 58   Resp 12   Wt 106.7 kg (235 lb 3.2 oz)   SpO2 95%   BMI 33.75 kg/m       Date Radiation Completed: 3/29/24  vocal chord/larynx    Pain  Denies    Meds  Current Med List Reviewed: Yes  Medication Note:     Labs  TSH   Date Value Ref Range Status   06/10/2019 2.18 0.35 - 4.94 uIU/mL Final   ]    Imaging  None    Skin:Warm  Dry  Intact  Oral Products: only needs water  Mucositis: No  Dry mouth: No, occasionally a little dry in throat - water helps.  Notes he does feel throat irritation if there is forest fire smoke or air quality alerts related to that - but he knows to minimize time outside - he has not trouble indoors related to that.  Dental evaluation: NA edentulous   PEG tube: No  Speech therapy: No  Lymphedema: No  Energy Level:normal  Appetite: normal  Intake: \" I can eat and drink whatever I would like  - I feel really good, I think I've recovered well!\"  Weight:    Wt Readings from Last 5 Encounters:   24 106.7 kg (235 lb 3.2 oz)   24 101.8 kg (224 lb 6.4 oz)   24 99.8 kg (220 lb)   04/15/24 96.9 kg (213 lb 9.6 oz)   24 99.8 kg (220 lb)       Appointments:     DATE  Oncologist: SUNNY    ENT:  Dr. Flora Juan  [facility] ENT clinic   24 and again in 3 months   Primary:      Other Notes:   "

## 2024-12-23 ENCOUNTER — OFFICE VISIT (OUTPATIENT)
Dept: RADIATION THERAPY | Facility: OUTPATIENT CENTER | Age: 69
End: 2024-12-23
Payer: COMMERCIAL

## 2024-12-23 VITALS
SYSTOLIC BLOOD PRESSURE: 175 MMHG | RESPIRATION RATE: 16 BRPM | BODY MASS INDEX: 34.78 KG/M2 | OXYGEN SATURATION: 95 % | DIASTOLIC BLOOD PRESSURE: 86 MMHG | HEART RATE: 63 BPM | WEIGHT: 242.4 LBS

## 2024-12-23 DIAGNOSIS — C32.0 SQUAMOUS CELL CARCINOMA OF RIGHT VOCAL CORD (H): Primary | ICD-10-CM

## 2024-12-23 NOTE — LETTER
12/23/2024      Jean Claude Sneed  [address] [place] [address]      Dear Colleague,    Thank you for referring your patient, Jean Claude Sneed, to the [facility] RADIATION [facility]ERAPY [facility]INIC. Please see a copy of my visit note below.    Radiation Oncology Progress Note    HPI: Mr. 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  He underwent definitive radiation therapy.     Radiation Treatment  2/21/24 to 3/29/24: Larynx, 6300 cGy in 28 fractions    Patient returns for follow-up.    Following with Dr. Juan ENT team.  Scope on 12/17/24 was IRAIDA.      Is overall doing okay. Tolerating almost all foods orally.  No longer needing oxycodone or magic mouthwash.  Having thickened saliva from xerostomia but this continues to improve.  Using salt and soda rinses intermittently. Has noted overall improved hoarseness.        BP (!) 175/86 (BP Location: Left arm, Patient Position: Sitting, Cuff Size: Adult Large)   Pulse 63   Resp 16   Wt 110 kg (242 lb 6.4 oz)   SpO2 95%   BMI 34.78 kg/m    NAD  + Hoarseness (continues to improve)  No respiratory distress    Plan:   Clinically IRAIDA. Scope by ENT team on 12/17/2024 was IRAIDA.   Appropriate recovery post completion of radiation treatment as far.  Anticipate further recovery with time.  Xerostomia.  Salt and soda rinses as needed.  Hydration.  Mucinex to thin secretions PRN.  Consider XyliMelts and All Day spray.  Nutrition.  Advance diet orally as tolerated.  Continue oncologic surveillance per ENT team (Dr. Juan).  RTC in 6 months.     30 minutes spent on the date of the encounter doing chart review, review of outside records, review of test results, interpretation of tests, patient visit, documentation, discussion, and plan.      Joby Mari M.D.  Department of Radiation Oncology  [facility]       Again, thank you for allowing  me to participate in the care of your patient.        Sincerely,        Joby Mari MD    Electronically signed

## 2024-12-23 NOTE — PROGRESS NOTES
Radiation Oncology Progress Note    HPI: Mr. 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  He underwent definitive radiation therapy.     Radiation Treatment  2/21/24 to 3/29/24: Larynx, 6300 cGy in 28 fractions    Patient returns for follow-up.    Following with Dr. Juan ENT team.  Scope on 12/17/24 was IRAIDA.      Is overall doing okay. Tolerating almost all foods orally.  No longer needing oxycodone or magic mouthwash.  Having thickened saliva from xerostomia but this continues to improve.  Using salt and soda rinses intermittently. Has noted overall improved hoarseness.        BP (!) 175/86 (BP Location: Left arm, Patient Position: Sitting, Cuff Size: Adult Large)   Pulse 63   Resp 16   Wt 110 kg (242 lb 6.4 oz)   SpO2 95%   BMI 34.78 kg/m    NAD  + Hoarseness (continues to improve)  No respiratory distress    Plan:   Clinically IRAIDA. Scope by ENT team on 12/17/2024 was IRAIDA.   Appropriate recovery post completion of radiation treatment as far.  Anticipate further recovery with time.  Xerostomia.  Salt and soda rinses as needed.  Hydration.  Mucinex to thin secretions PRN.  Consider XyliMelts and All Day spray.  Nutrition.  Advance diet orally as tolerated.  Continue oncologic surveillance per ENT team (Dr. Juan).  RTC in 6 months.     30 minutes spent on the date of the encounter doing chart review, review of outside records, review of test results, interpretation of tests, patient visit, documentation, discussion, and plan.      Joby Mari M.D.  Department of Radiation Oncology  [facility]

## 2024-12-23 NOTE — NURSING NOTE
"Oncology Rooming Note    December 23, 2024 1:45 PM   Jean Claude Sneed is a 69 year old male who presents for:    Chief Complaint   Patient presents with    Radiation Therapy     Follow up with Dr. Mari     Initial Vitals: BP (!) 175/86 (BP Location: Left arm, Patient Position: Sitting, Cuff Size: Adult Large)   Pulse 63   Resp 16   Wt 110 kg (242 lb 6.4 oz)   SpO2 95%   BMI 34.78 kg/m   Estimated body mass index is 34.78 kg/m  as calculated from the following:    Height as of 7/20/22: 1.778 m (5' 10\").    Weight as of this encounter: 110 kg (242 lb 6.4 oz). Body surface area is 2.33 meters squared.  Data Unavailable Comment: Data Unavailable   No LMP for male patient.  Allergies reviewed: Yes  Medications reviewed: Yes    Medications: Medication refills not needed today.  Pharmacy name entered into [employer]:    [id number] - [place], [place] - [address] [facility] - [place], [place] - [facility] - [address]    Frailty Screening:   Is the patient here for a new oncology consult visit in cancer care? 2. No      Clinical concerns: patient completed radiation for vocal cord cancer 3/29/24. Returns to clinic for routine post treatment follow up.  Pt saw his ENT, Dr. Juan, [facility] on 12/16/24 - notes received.  Pt reports no pain, no problems with swallowing/eating, no new concerns. Only ongoing side effect is dry throat, most noticeable at night time with coughing up a little dry/white phlegm.    MD was notified.      Laurie Armas RN             "

## 2025-06-02 ENCOUNTER — TRANSFERRED RECORDS (OUTPATIENT)
Dept: HEALTH INFORMATION MANAGEMENT | Facility: CLINIC | Age: 70
End: 2025-06-02

## 2025-07-03 ENCOUNTER — TRANSFERRED RECORDS (OUTPATIENT)
Dept: RADIATION THERAPY | Facility: OUTPATIENT CENTER | Age: 70
End: 2025-07-03

## 2025-07-22 ENCOUNTER — OFFICE VISIT (OUTPATIENT)
Dept: RADIATION THERAPY | Facility: OUTPATIENT CENTER | Age: 70
End: 2025-07-22
Payer: COMMERCIAL

## 2025-07-22 ENCOUNTER — TRANSFERRED RECORDS (OUTPATIENT)
Dept: HEALTH INFORMATION MANAGEMENT | Facility: CLINIC | Age: 70
End: 2025-07-22

## 2025-07-22 DIAGNOSIS — C32.0 SQUAMOUS CELL CARCINOMA OF RIGHT VOCAL CORD (H): Primary | ICD-10-CM

## 2025-07-22 NOTE — PROGRESS NOTES
Radiation Oncology Progress Note    Radiation Oncologist: Joby Mari MD  Provider: JOSE MARIA Clements  LCV: 12/23/24 with Dr Mari    HPI: Mr. 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  He underwent definitive radiation therapy.     Radiation Treatment  2/21/24 to 3/29/24: Larynx 6300 cGy in 28 fractions    Patient returns for follow-up.    Following with Dr. Juan ENT team on 6/2/25, he was scoped that day was IRAIDA.    Per outside note flexible laryngoscopy the nose and nasopharynx were visualized and were normal, the entire hypopharynx and laryngopharynx were visualized and were normal.     Is overall doing okay. Tolerating almost all foods orally.  No longer needing oxycodone or magic mouthwash.  No longer having thickened saliva. No pain with swolling, no taste change  Has noted overall improved hoarseness.      He is diabetic, on pills and has a once per week shot.   Hoarseness comes and goes.   No coughing   Saw ENT June 2025 was told to return in 6 months with scope      There were no vitals taken for this visit.  General NAD  HEENT; normocephalic atraumatic, PERRLA, external ears normal, nasal skin normal, lips normal. gums normal, oral cavity, soft palate normal, oral mucosa with normal color and moisture, no mucosal lesions, anterior tongue normal, hard palate normal, normal symmetry of neck, trachea midline, no thyroid hypertrophy, nodules or masses. Right and left neck nodes non tender to palpation, no lymphadenopathy bilaterally  No respiratory distress    Plan:   Clinically IRAIDA. Scope by ENT team on 6/2/25 normal. Note uploaded to HIMS  Appropriate recovery post completion of radiation treatment as far.    Continue oncologic surveillance per ENT team (Dr. Juan), scheduled for 12/2025  RTC in 6 months.     30 minutes spent on the date of the encounter doing chart  review, review of outside records, review of test results, interpretation of tests, patient visit, documentation, discussion, and plan.      JOSE MARIA Clements  Department of Radiation Oncology  [facility]

## 2025-07-22 NOTE — LETTER
7/22/2025      Jean Claude Sneed  [address] [facility] [address]      Dear Colleague,    Thank you for referring your patient, Jean Claude Sneed, to the [facility] RAD[facility]ATION THERAPY CLINIC. Please see a copy of my visit note below.    Radiation Oncology Progress Note    Radiation Oncologist: Joby Mari MD  Provider: JOSE MARIA Celments  LCV: 12/23/24 with Dr Mari    HPI: Mr. 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  He underwent definitive radiation therapy.     Radiation Treatment  2/21/24 to 3/29/24: Larynx 6300 cGy in 28 fractions    Patient returns for follow-up.    Following with Dr. Juan ENT team on 6/2/25, he was scoped that day was IRAIDA.    Per outside note flexible laryngoscopy the nose and nasopharynx were visualized and were normal, the entire hypopharynx and laryngopharynx were visualized and were normal.     Is overall doing okay. Tolerating almost all foods orally.  No longer needing oxycodone or magic mouthwash.  No longer having thickened saliva. No pain with swolling, no taste change  Has noted overall improved hoarseness.      He is diabetic, on pills and has a once per week shot.   Hoarseness comes and goes.   No coughing   Saw ENT June 2025 was told to return in 6 months with scope      There were no vitals taken for this visit.  General NAD  HEENT; normocephalic atraumatic, PERRLA, external ears normal, nasal skin normal, lips normal. gums normal, oral cavity, soft palate normal, oral mucosa with normal color and moisture, no mucosal lesions, anterior tongue normal, hard palate normal, normal symmetry of neck, trachea midline, no thyroid hypertrophy, nodules or masses. Right and left neck nodes non tender to palpation, no lymphadenopathy bilaterally  No respiratory distress    Plan:   Clinically IRAIDA. Scope by ENT team on 6/2/25 normal. Note uploaded to  HIMS  Appropriate recovery post completion of radiation treatment as far.    Continue oncologic surveillance per ENT team (Dr. Juan), scheduled for 12/2025  RT in 6 months.     30 minutes spent on the date of the encounter doing chart review, review of outside records, review of test results, interpretation of tests, patient visit, documentation, discussion, and plan.      JOSE MARIA Clements  Department of Radiation Oncology  [facility]       Again, thank you for allowing me to participate in the care of your patient.        Sincerely,        Iraida Alford PA-C    Electronically signed